# Patient Record
Sex: FEMALE | Race: WHITE | NOT HISPANIC OR LATINO | Employment: OTHER | URBAN - METROPOLITAN AREA
[De-identification: names, ages, dates, MRNs, and addresses within clinical notes are randomized per-mention and may not be internally consistent; named-entity substitution may affect disease eponyms.]

---

## 2017-03-14 ENCOUNTER — OFFICE VISIT (OUTPATIENT)
Dept: NEUROLOGY | Facility: CLINIC | Age: 77
End: 2017-03-14
Payer: MEDICARE

## 2017-03-14 VITALS
SYSTOLIC BLOOD PRESSURE: 118 MMHG | HEART RATE: 78 BPM | WEIGHT: 160.19 LBS | HEIGHT: 62 IN | BODY MASS INDEX: 29.48 KG/M2 | RESPIRATION RATE: 16 BRPM | DIASTOLIC BLOOD PRESSURE: 78 MMHG

## 2017-03-14 DIAGNOSIS — M54.5 LOW BACK PAIN, UNSPECIFIED BACK PAIN LATERALITY, UNSPECIFIED CHRONICITY, WITH SCIATICA PRESENCE UNSPECIFIED: ICD-10-CM

## 2017-03-14 DIAGNOSIS — M79.605 BILATERAL LEG PAIN: ICD-10-CM

## 2017-03-14 DIAGNOSIS — E53.8 VITAMIN B12 DEFICIENCY: ICD-10-CM

## 2017-03-14 DIAGNOSIS — E55.9 VITAMIN D DEFICIENCY: ICD-10-CM

## 2017-03-14 DIAGNOSIS — F32.A DEPRESSION, UNSPECIFIED DEPRESSION TYPE: ICD-10-CM

## 2017-03-14 DIAGNOSIS — M79.604 BILATERAL LEG PAIN: ICD-10-CM

## 2017-03-14 DIAGNOSIS — G57.93 NEUROPATHY INVOLVING BOTH LOWER EXTREMITIES: Primary | ICD-10-CM

## 2017-03-14 PROBLEM — M54.50 LOWER BACK PAIN: Status: ACTIVE | Noted: 2017-03-14

## 2017-03-14 PROCEDURE — 3074F SYST BP LT 130 MM HG: CPT | Mod: S$GLB,,, | Performed by: NURSE PRACTITIONER

## 2017-03-14 PROCEDURE — 99214 OFFICE O/P EST MOD 30 MIN: CPT | Mod: S$GLB,,, | Performed by: NURSE PRACTITIONER

## 2017-03-14 PROCEDURE — 99999 PR PBB SHADOW E&M-EST. PATIENT-LVL III: CPT | Mod: PBBFAC,,, | Performed by: NURSE PRACTITIONER

## 2017-03-14 PROCEDURE — 1160F RVW MEDS BY RX/DR IN RCRD: CPT | Mod: S$GLB,,, | Performed by: NURSE PRACTITIONER

## 2017-03-14 PROCEDURE — 3078F DIAST BP <80 MM HG: CPT | Mod: S$GLB,,, | Performed by: NURSE PRACTITIONER

## 2017-03-14 PROCEDURE — 1159F MED LIST DOCD IN RCRD: CPT | Mod: S$GLB,,, | Performed by: NURSE PRACTITIONER

## 2017-03-14 PROCEDURE — 1157F ADVNC CARE PLAN IN RCRD: CPT | Mod: S$GLB,,, | Performed by: NURSE PRACTITIONER

## 2017-03-14 RX ORDER — DICLOFENAC SODIUM 1 MG/ML
1 SOLUTION/ DROPS OPHTHALMIC 4 TIMES DAILY
COMMUNITY
End: 2018-01-04

## 2017-03-14 RX ORDER — METOPROLOL SUCCINATE 25 MG/1
25 TABLET, EXTENDED RELEASE ORAL DAILY
COMMUNITY
End: 2018-01-04 | Stop reason: DRUGHIGH

## 2017-03-14 RX ORDER — CILOSTAZOL 50 MG/1
50 TABLET ORAL 2 TIMES DAILY
COMMUNITY
End: 2019-10-29

## 2017-03-14 RX ORDER — DULOXETIN HYDROCHLORIDE 30 MG/1
30 CAPSULE, DELAYED RELEASE ORAL DAILY
Qty: 30 CAPSULE | Refills: 11 | Status: SHIPPED | OUTPATIENT
Start: 2017-03-14 | End: 2018-05-17

## 2017-03-14 NOTE — MR AVS SNAPSHOT
[facility]  [address] [address]  Phone: [phone]  Fax: [phone]                  Merari Romero   3/14/2017 11:20 AM   Office Visit    Description:  Female : 1940   Provider:  Anne Marie Sandra NP   Department:  [facility]           Reason for Visit     Follow-up     Pain           Diagnoses this Visit        Comments    Neuropathy involving both lower extremities    -  Primary     Low back pain, unspecified back pain laterality, unspecified chronicity, with sciatica presence unspecified         Bilateral leg pain         Vitamin B12 deficiency         Vitamin D deficiency         Depression, unspecified depression type                To Do List           Future Appointments        Provider Department Dept Phone    3/14/2017 11:50 AM LAB, [facility] [phone]    2017 10:00 AM Bo Preston MD [facility] [phone]      Goals (5 Years of Data)     None       These Medications        Disp Refills Start End    duloxetine (CYMBALTA) 30 MG capsule 30 capsule 11 3/14/2017 3/14/2018    Take 1 capsule (30 mg total) by mouth once daily. - Oral    Pharmacy: [address] Ph #: [phone]         [facility]     [facility] -  Assistance  Registered nurses in the [facility] provide clinical advisement, health education, appointment booking, and other advisory services.  Call for this free service at [phone].             Medications           Message regarding Medications     Verify the changes and/or additions to your medication regime listed below are the same as discussed with your clinician today.  If any of these changes or additions are incorrect, please notify your healthcare provider.        START taking these NEW medications        Refills    duloxetine (CYMBALTA) 30 MG  capsule 11    Sig: Take 1 capsule (30 mg total) by mouth once daily.    Class: Normal    Route: Oral      STOP taking these medications     clopidogrel (PLAVIX) 75 mg tablet Take 75 mg by mouth once daily.    doxycycline (VIBRAMYCIN) 100 MG Cap Take 100 mg by mouth every 12 (twelve) hours.    predniSONE (DELTASONE) 20 MG tablet Take 20 mg by mouth 2 (two) times daily.     zolpidem (AMBIEN) 10 mg Tab Take 1 tablet (10 mg total) by mouth nightly as needed.           Verify that the below list of medications is an accurate representation of the medications you are currently taking.  If none reported, the list may be blank. If incorrect, please contact your healthcare provider. Carry this list with you in case of emergency.           Current Medications     albuterol (PROVENTIL) 2.5 mg /3 mL (0.083 %) nebulizer solution Take 3 mLs (2.5 mg total) by nebulization every 6 (six) hours as needed.    amlodipine (NORVASC) 5 MG tablet take 1 tablet by mouth once daily    atorvastatin (LIPITOR) 20 MG tablet Take 20 mg by mouth once daily.    BREO ELLIPTA 100-25 mcg/dose diskus inhaler Inhale 1 puff into the lungs once daily.     cilostazol (PLETAL) 50 MG Tab Take 50 mg by mouth 2 (two) times daily.    diclofenac (VOLTAREN) 0.1 % ophthalmic solution 1 drop 4 (four) times daily.    fexofenadine (ALLEGRA) 60 MG tablet Take 1 tablet (60 mg total) by mouth once daily.    folic acid (FOLVITE) 1 MG tablet Take 1 mg by mouth once daily.     gabapentin (NEURONTIN) 600 MG tablet Take 600 mg by mouth 3 (three) times daily.     hydrocodone-acetaminophen 7.5-325mg (NORCO) 7.5-325 mg per tablet Take 1 tablet by mouth 3 (three) times daily.    INCRUSE ELLIPTA 62.5 mcg/actuation DsDv Take 1 puff by mouth once daily.    ketoconazole (NIZORAL) 2 % cream     meclizine (ANTIVERT) 25 mg tablet Take 25 mg by mouth every 6 (six) hours.     metoprolol succinate (TOPROL-XL) 25 MG 24 hr tablet Take 25 mg by mouth once daily.    omeprazole (PRILOSEC) 20  "MG capsule take 1 capsule by mouth once daily    PROAIR HFA 90 mcg/actuation inhaler Inhale 1-2 puffs into the lungs every 6 (six) hours as needed.     duloxetine (CYMBALTA) 30 MG capsule Take 1 capsule (30 mg total) by mouth once daily.    ipratropium (ATROVENT) 0.02 % nebulizer solution Take 2.5 mLs (0.5 mg total) by nebulization 4 (four) times daily.    pilocarpine (SALAGEN) 5 MG Tab            Clinical Reference Information           Your Vitals Were     BP Pulse Resp Height Weight Last Period    118/78 (BP Location: Right arm, Patient Position: Sitting, BP Method: Manual) 78 16 5' 2" (1.575 m) 72.7 kg (160 lb 2.6 oz) [birth year]/1978    BMI                29.29 kg/m2          Blood Pressure          Most Recent Value    BP  118/78      Allergies as of 3/14/2017     No Known Drug Allergies      Immunizations Administered on Date of Encounter - 3/14/2017     None      Orders Placed During Today's Visit     Future Labs/Procedures Expected by Expires    CBC auto differential  3/14/2017 5/13/2018    Comprehensive metabolic panel  3/14/2017 3/14/2018    Protein electrophoresis, serum  3/14/2017 5/13/2018    TSH  3/14/2017 5/13/2018    Vitamin B12  3/14/2017 5/13/2018    Vitamin D  3/14/2017 5/13/2018    EMG - 2 Extremities  As directed 3/14/2018    Nerve conduction test  As directed 3/14/2018      Language Assistance Services     ATTENTION: Language assistance services are available, free of charge. Please call [phone].      ATENCIÓN: Si habla barbie, tiene a novoa disposición servicios gratuitos de asistencia lingüística. Llame al [phone].     CHÚ Ý: N?u b?n nói Ti?ng Vi?t, có các d?ch v? h? tr? ngôn ng? mi?n phí dành cho b?n. G?i s? [phone].         [facility] complies with applicable Federal civil rights laws and does not discriminate on the basis of race, color, national origin, age, disability, or sex.        "

## 2017-03-14 NOTE — PROGRESS NOTES
HPI: Merari Romero is a 77 y.o. female was previously followed by Dr. Preston for multiple neurological work ups in the past, [place], for complaint of saddle anesthesia, BLE weakness and pain, with low back pain, with unknown etiology. She progressed with more complaints, with incontinence at times. Work up initially revealed degenerative disc disease at L5-S1 and monoclonal gammonopathy of undetermined signficance; repeat EMGs normal with normal T-spine imaging and stable degenerative changes on C-spine imaging. She was seen by Dr. Chavez in 2013, and was referred to cardiology for evaluation of PAD, which is followed by Dr. Seals. She had a Sudoscan per Dr. Shaun Barraza, which suggested peripheral autonomic neuropathy. She was also worked up by Rheumatology in the past, with no evidence of CT disease.     She presents today for a follow up visit. She was last seen by Dr. Preston in 3/2016, and was lost to follow up. She has worsening complaints of bilateral leg pain today, L>R, as well as BLE paresthesias, and lumbar pain. Her greatest complaint today is pain to her great toes bilaterally with numbness. She also admits to having some depression.     Review of Systems   Constitutional: Negative for fever.   Eyes: Negative for double vision.   Respiratory: Negative for hemoptysis.    Cardiovascular: Negative for leg swelling.   Gastrointestinal: Negative for blood in stool.   Genitourinary: Negative for hematuria.   Musculoskeletal: Positive for back pain. Negative for falls.   Skin: Negative for rash.   Neurological: Positive for sensory change. Negative for dizziness, loss of consciousness and headaches.   Psychiatric/Behavioral: Positive for depression. Negative for memory loss.     Exam:   Gen Appearance, well developed/nourished in no apparent distress  CV: 2+ distal pulses with no edema or swelling  Neuro:  MS: Awake, alert, Sustains attention. Recent/remote memory intact, Language is full  to spontaneous speech/comprehension. Fund of Knowledge is full  CN: Optic discs are flat with normal vasculature, PERRL, Extraoccular movements and visual fields are full. Normal facial sensation and strength, Tongue and Palate are midline and strong. Shoulder Shrug symmetric and strong.  Motor: Normal except in the left leg and bilateral hand and feet intrinsics, tone mostly normal except possibly the left leg,no fasiculations today, 5/5 strength bilateral upper/lower extremities except 4++/5 bilateral intrinsic foot and hand muscles with left leg reduced to 4/5 left hip flex and 4/5 in the left foot dorsiflexion and knee extension with 2+ reflexes in the arms and 1+ in the legs and bilateral plantar response, No clonus.   Sensory: symmetric decrease to temp vibration in the distal legs; Romberg negative  Gait: Normal stance, no ataxia, but there is signs of left leg weakness again noted today. -- patient states she usually uses a cane    Imaging:     3/1/12 bone scan reviewed: Degenerative changes only    1/2013 MRI L spine:   IMPRESSION: DISC SPACE NARROWING AT L3-4 AND L5-S1  WITH A SLIGHT FIRST DEGREE SPONDYLOLISTHESIS AT L5-S1 ON  A DEGENERATIVE BASIS OF THE APOPHYSEAL JOINTS AT THIS  LEVEL. 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 PROBABLE SMALL TARLOV  CYST AT T12-L1 ON THE RIGHT.    Sudoscan with Dr Barraza: Possible autonomic neuropathy    Assessment/Recommendation:  Merari Romero is a 77 y.o. female was previously followed by Dr. Preston for multiple neurological work ups in the past, which were [place] of state, for complaint of saddle anesthesia, BLE weakness and pain, with low back pain, with unknown etiology. She progressed with more complaints, with incontinence at times. Work up initially revealed degenerative disc disease at L5-S1 and monoclonal gammonopathy of undetermined signficance; repeat EMGs normal with normal T-spine imaging and stable degenerative changes on C-spine imaging. She was seen by Dr. Chavez in  2013, and was referred to cardiology for evaluation of PAD, which is followed by Dr. Saels. She had a Sudoscan per Dr. Shaun Barraza, which suggested peripheral autonomic neuropathy. She was also worked up by Rheumatology in the past, with no evidence of CT disease.     She presents today with worsening complaints of bilateral leg pain and paresthesias, as well as worsening lumbar complaints.     1. Start Cymbalta for neuropathic type complaints, as well as her depression, in addition to her Gabapentin. She is also using a compound cream and Norco prn.   2. Repeat SPEP with RICHI today, as well as a CBC, CMP, TSH, B12, and Vitamin D level, given her prior abnormal SPEP and worsening neuropathic complaints.   3. Order EMG/NCS BLE to assess for neuropathic and lumbar radicular causes.     Follow up one week after EMG.           1. The patient has had a sudoscan with PCP showing possible autonomic neuropathy. 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 She does not have any autonomic features currently and EMGs have been normal prior for large fiber changes  2. Repeat SPEP with RICHI today. Hematology oncology follow up needed, as recommended prior, for MGUS. Patient asked to call for results.   3. Neurosurgery in 2013 recommended no surgery for her Lumbar disc disease.She has had lumbar injections and PT in the past. She is using pain medications with PCP and also has a history of left hip repair which also limits leg movement and gait exam in my opinion  4. Patient is also treated with Pletal by Cardiology for PAD in the legs  5. Previously with me she tried baclofen, flexeril for pain. She is on gabapentin for pain modulation currently but is not using TID as prescribed on most days. Can increase to TID as prescribed consistently for further pain relief.   It was the impression of Dr Rojo, in second  opinion, that some of her symptoms could be referred from the C spine. Will continue to follow  6. Neurological exam has not  worsened since 2013 visit. Advised patient to use a cane always/ fall precautions reviewed.       RTC in 3 months  Cc: Dr Barraza

## 2018-01-04 ENCOUNTER — OFFICE VISIT (OUTPATIENT)
Dept: INTERNAL MEDICINE | Facility: CLINIC | Age: 78
End: 2018-01-04
Payer: MEDICARE

## 2018-01-04 VITALS
HEART RATE: 74 BPM | DIASTOLIC BLOOD PRESSURE: 66 MMHG | WEIGHT: 149 LBS | HEIGHT: 63 IN | SYSTOLIC BLOOD PRESSURE: 112 MMHG | OXYGEN SATURATION: 96 % | RESPIRATION RATE: 18 BRPM | BODY MASS INDEX: 26.4 KG/M2

## 2018-01-04 DIAGNOSIS — M19.90 OSTEOARTHRITIS, UNSPECIFIED OSTEOARTHRITIS TYPE, UNSPECIFIED SITE: ICD-10-CM

## 2018-01-04 DIAGNOSIS — M47.816 LUMBAR FACET ARTHROPATHY: ICD-10-CM

## 2018-01-04 DIAGNOSIS — E78.5 HYPERLIPIDEMIA LDL GOAL <70: ICD-10-CM

## 2018-01-04 DIAGNOSIS — M79.2 NEURALGIA AND NEURITIS: ICD-10-CM

## 2018-01-04 DIAGNOSIS — I10 ESSENTIAL HYPERTENSION: Primary | ICD-10-CM

## 2018-01-04 DIAGNOSIS — G57.93 NEUROPATHY INVOLVING BOTH LOWER EXTREMITIES: ICD-10-CM

## 2018-01-04 PROCEDURE — 99204 OFFICE O/P NEW MOD 45 MIN: CPT | Mod: S$GLB,,, | Performed by: NURSE PRACTITIONER

## 2018-01-04 PROCEDURE — 99999 PR PBB SHADOW E&M-EST. PATIENT-LVL V: CPT | Mod: PBBFAC,,, | Performed by: NURSE PRACTITIONER

## 2018-01-04 RX ORDER — METOPROLOL SUCCINATE 50 MG/1
50 TABLET, EXTENDED RELEASE ORAL DAILY
COMMUNITY
Start: 2017-12-19 | End: 2019-10-29

## 2018-01-04 RX ORDER — OMEPRAZOLE 40 MG/1
40 CAPSULE, DELAYED RELEASE ORAL
COMMUNITY
Start: 2017-11-30 | End: 2019-10-29

## 2018-01-04 RX ORDER — FUROSEMIDE 20 MG/1
20 TABLET ORAL 2 TIMES DAILY
Status: ON HOLD | COMMUNITY
End: 2018-05-11

## 2018-01-04 RX ORDER — METHOCARBAMOL 500 MG/1
TABLET, FILM COATED ORAL
COMMUNITY
Start: 2017-12-18 | End: 2018-01-04

## 2018-01-04 NOTE — PROGRESS NOTES
Subjective:       Patient ID: Merari Romero is a 77 y.o. female.    Chief Complaint: Establish Care    Patient is known, to me and presents with   Chief Complaint   Patient presents with    [facility]   .  Denies chest pain and shortness of breath.  Patient presents with check up and is up to date with screenings. Does not want mammogram. Will need to be set up with pain management. Had flu shot.  HPI  Review of Systems   Constitutional: Negative for activity change, appetite change, fatigue, fever and unexpected weight change.   HENT: Negative for congestion, ear discharge, ear pain, hearing loss, postnasal drip and tinnitus.    Eyes: Negative for photophobia, pain and visual disturbance.   Respiratory: Negative for cough, shortness of breath, wheezing and stridor.    Cardiovascular: Negative for chest pain, palpitations and leg swelling.   Gastrointestinal: Negative for abdominal distention.   Genitourinary: Negative for difficulty urinating, dysuria, frequency, hematuria and urgency.   Musculoskeletal: Positive for arthralgias, back pain, gait problem and neck pain. Negative for joint swelling.   Skin: Negative.    Neurological: Negative for dizziness, seizures, syncope, weakness, light-headedness, numbness and headaches.   Hematological: Negative for adenopathy. Does not bruise/bleed easily.   Psychiatric/Behavioral: Negative for behavioral problems, confusion, hallucinations, sleep disturbance and suicidal ideas. The patient is not nervous/anxious.        Objective:      Physical Exam   Constitutional: She is oriented to person, place, and time. She appears well-developed and well-nourished. No distress.   HENT:   Head: Normocephalic and atraumatic.   Right Ear: External ear normal.   Left Ear: External ear normal.   Mouth/Throat: Oropharynx is clear and moist. No oropharyngeal exudate.   Eyes: Conjunctivae and EOM are normal. Pupils are equal, round, and reactive to light. Right eye exhibits no discharge.  Left eye exhibits no discharge.   Neck: Normal range of motion. Neck supple. No JVD present. No thyromegaly present.   Cardiovascular: Normal rate, regular rhythm, normal heart sounds and intact distal pulses.  Exam reveals no gallop and no friction rub.    No murmur heard.  Pulmonary/Chest: Effort normal and breath sounds normal. No stridor. No respiratory distress. She has no wheezes. She has no rales. She exhibits no tenderness.   Abdominal: Soft. Bowel sounds are normal. She exhibits no distension and no mass. There is no tenderness. There is no rebound.   Musculoskeletal: She exhibits no edema or tenderness.        Lumbar back: She exhibits decreased range of motion.   Lymphadenopathy:     She has no cervical adenopathy.   Neurological: She is alert and oriented to person, place, and time. She has normal reflexes. She displays normal reflexes. No cranial nerve deficit. She exhibits normal muscle tone. Coordination normal.   Skin: Skin is warm and dry. Capillary refill takes less than 2 seconds. No rash noted. No erythema. No pallor.   Psychiatric: She has a normal mood and affect. Her behavior is normal. Judgment and thought content normal.       Assessment:       1. Essential hypertension    2. Hyperlipidemia LDL goal <70    3. Lumbar facet arthropathy    4. Neuralgia and neuritis    5. Neuropathy involving both lower extremities    6. Osteoarthritis, unspecified osteoarthritis type, unspecified site        Plan:   Merari HALE was seen today for establish care.    Diagnoses and all orders for this visit:    Essential hypertension  -     Comprehensive metabolic panel; Future  -     CBC auto differential; Future  -     Microalbumin/creatinine urine ratio; Future    Hyperlipidemia LDL goal <70  -     Comprehensive metabolic panel; Future  -     CBC auto differential; Future  -     TSH; Future  -     Lipid panel; Future    Lumbar facet arthropathy  -     Comprehensive metabolic panel; Future  -     CBC auto  "differential; Future  -     Ambulatory referral to Pain Clinic    Neuralgia and neuritis  -     Comprehensive metabolic panel; Future  -     CBC auto differential; Future  -     Ambulatory referral to Pain Clinic    Neuropathy involving both lower extremities  -     Comprehensive metabolic panel; Future  -     CBC auto differential; Future  -     Ambulatory referral to Pain Clinic    Osteoarthritis, unspecified osteoarthritis type, unspecified site  -     Comprehensive metabolic panel; Future  -     CBC auto differential; Future  -     Ambulatory referral to Pain Clinic    "This note will not be shared with the patient."  Will send to pain management due to chronic pain meds  Continue with plan of care  Refills on meds.  Medication compliance was discussed with the patient.   Medication side effects were discussed.  The patient was instructed on using exercise frequently to reduce blood pressure.  Thirty to forty-five minutes of brisk walking three to four times a week is often helpful to lower your blood pressure.  Monitor blood pressures at home and to record the values in a log.  The patient was instructed to monitor weight closely and to try to keep it as close to ideal body weight as possible.  Reduce salt intake to less than 2 grams per day.  Do not add salt to food at the table.  Reduce or get rid of salt used in cooking.  Limit processed and fast foods.  Read package labels for amount of salt (soduim) in foods.  Losing weight, even just 10 pounds, of can decrease blood pressure.  rtc as scheduled  "

## 2018-01-04 NOTE — PATIENT INSTRUCTIONS

## 2018-01-05 ENCOUNTER — CLINICAL SUPPORT (OUTPATIENT)
Dept: INTERNAL MEDICINE | Facility: CLINIC | Age: 78
End: 2018-01-05
Payer: MEDICARE

## 2018-01-05 DIAGNOSIS — M19.90 OSTEOARTHRITIS, UNSPECIFIED OSTEOARTHRITIS TYPE, UNSPECIFIED SITE: ICD-10-CM

## 2018-01-05 DIAGNOSIS — M79.2 NEURALGIA AND NEURITIS: ICD-10-CM

## 2018-01-05 DIAGNOSIS — I10 ESSENTIAL HYPERTENSION: ICD-10-CM

## 2018-01-05 DIAGNOSIS — M47.816 LUMBAR FACET ARTHROPATHY: ICD-10-CM

## 2018-01-05 DIAGNOSIS — G57.93 NEUROPATHY INVOLVING BOTH LOWER EXTREMITIES: ICD-10-CM

## 2018-01-05 DIAGNOSIS — E78.5 HYPERLIPIDEMIA LDL GOAL <70: ICD-10-CM

## 2018-01-05 LAB
ALBUMIN SERPL BCP-MCNC: 3.3 G/DL
ALP SERPL-CCNC: 119 U/L
ALT SERPL W/O P-5'-P-CCNC: 21 U/L
ANION GAP SERPL CALC-SCNC: 7 MMOL/L
AST SERPL-CCNC: 33 U/L
BASOPHILS # BLD AUTO: 0.06 K/UL
BASOPHILS NFR BLD: 1 %
BILIRUB SERPL-MCNC: 0.6 MG/DL
BUN SERPL-MCNC: 13 MG/DL
CALCIUM SERPL-MCNC: 9.3 MG/DL
CHLORIDE SERPL-SCNC: 101 MMOL/L
CHOLEST SERPL-MCNC: 176 MG/DL
CHOLEST/HDLC SERPL: 3.1 {RATIO}
CO2 SERPL-SCNC: 29 MMOL/L
CREAT SERPL-MCNC: 0.8 MG/DL
CREAT UR-MCNC: 145 MG/DL
DIFFERENTIAL METHOD: ABNORMAL
EOSINOPHIL # BLD AUTO: 0.4 K/UL
EOSINOPHIL NFR BLD: 7 %
ERYTHROCYTE [DISTWIDTH] IN BLOOD BY AUTOMATED COUNT: 13.9 %
EST. GFR  (AFRICAN AMERICAN): >60 ML/MIN/1.73 M^2
EST. GFR  (NON AFRICAN AMERICAN): >60 ML/MIN/1.73 M^2
GLUCOSE SERPL-MCNC: 82 MG/DL
HCT VFR BLD AUTO: 43.6 %
HDLC SERPL-MCNC: 56 MG/DL
HDLC SERPL: 31.8 %
HGB BLD-MCNC: 13.9 G/DL
IMM GRANULOCYTES # BLD AUTO: 0.03 K/UL
IMM GRANULOCYTES NFR BLD AUTO: 0.5 %
LDLC SERPL CALC-MCNC: 91.8 MG/DL
LYMPHOCYTES # BLD AUTO: 1.9 K/UL
LYMPHOCYTES NFR BLD: 29.9 %
MCH RBC QN AUTO: 27.7 PG
MCHC RBC AUTO-ENTMCNC: 31.9 G/DL
MCV RBC AUTO: 87 FL
MICROALBUMIN UR DL<=1MG/L-MCNC: 6 UG/ML
MICROALBUMIN/CREATININE RATIO: 4.1 UG/MG
MONOCYTES # BLD AUTO: 0.6 K/UL
MONOCYTES NFR BLD: 10.2 %
NEUTROPHILS # BLD AUTO: 3.2 K/UL
NEUTROPHILS NFR BLD: 51.4 %
NONHDLC SERPL-MCNC: 120 MG/DL
NRBC BLD-RTO: 0 /100 WBC
PLATELET # BLD AUTO: 256 K/UL
PMV BLD AUTO: 10.9 FL
POTASSIUM SERPL-SCNC: 4.4 MMOL/L
PROT SERPL-MCNC: 8 G/DL
RBC # BLD AUTO: 5.02 M/UL
SODIUM SERPL-SCNC: 137 MMOL/L
TRIGL SERPL-MCNC: 141 MG/DL
TSH SERPL DL<=0.005 MIU/L-ACNC: 1.4 UIU/ML
WBC # BLD AUTO: 6.25 K/UL

## 2018-01-05 PROCEDURE — 99999 PR PBB SHADOW E&M-EST. PATIENT-LVL I: CPT | Mod: PBBFAC,,,

## 2018-01-05 PROCEDURE — 82043 UR ALBUMIN QUANTITATIVE: CPT

## 2018-01-05 PROCEDURE — 84443 ASSAY THYROID STIM HORMONE: CPT

## 2018-01-05 PROCEDURE — 85025 COMPLETE CBC W/AUTO DIFF WBC: CPT

## 2018-01-05 PROCEDURE — 80053 COMPREHEN METABOLIC PANEL: CPT

## 2018-01-05 PROCEDURE — 36415 COLL VENOUS BLD VENIPUNCTURE: CPT | Mod: S$GLB,,, | Performed by: NURSE PRACTITIONER

## 2018-01-05 PROCEDURE — 80061 LIPID PANEL: CPT

## 2018-02-12 ENCOUNTER — OFFICE VISIT (OUTPATIENT)
Dept: INTERNAL MEDICINE | Facility: CLINIC | Age: 78
End: 2018-02-12
Payer: MEDICARE

## 2018-02-12 VITALS
TEMPERATURE: 97 F | HEART RATE: 74 BPM | HEIGHT: 63 IN | RESPIRATION RATE: 20 BRPM | WEIGHT: 149 LBS | OXYGEN SATURATION: 96 % | DIASTOLIC BLOOD PRESSURE: 60 MMHG | SYSTOLIC BLOOD PRESSURE: 110 MMHG | BODY MASS INDEX: 26.4 KG/M2

## 2018-02-12 DIAGNOSIS — B34.9 VIRAL ILLNESS: Primary | ICD-10-CM

## 2018-02-12 PROCEDURE — 1159F MED LIST DOCD IN RCRD: CPT | Mod: S$GLB,,, | Performed by: NURSE PRACTITIONER

## 2018-02-12 PROCEDURE — 96372 THER/PROPH/DIAG INJ SC/IM: CPT | Mod: S$GLB,,, | Performed by: INTERNAL MEDICINE

## 2018-02-12 PROCEDURE — 1126F AMNT PAIN NOTED NONE PRSNT: CPT | Mod: S$GLB,,, | Performed by: NURSE PRACTITIONER

## 2018-02-12 PROCEDURE — 3008F BODY MASS INDEX DOCD: CPT | Mod: S$GLB,,, | Performed by: NURSE PRACTITIONER

## 2018-02-12 PROCEDURE — 99213 OFFICE O/P EST LOW 20 MIN: CPT | Mod: S$GLB,,, | Performed by: NURSE PRACTITIONER

## 2018-02-12 PROCEDURE — 99999 PR PBB SHADOW E&M-EST. PATIENT-LVL V: CPT | Mod: PBBFAC,,, | Performed by: NURSE PRACTITIONER

## 2018-02-12 RX ORDER — AMITRIPTYLINE HYDROCHLORIDE 25 MG/1
25 TABLET, FILM COATED ORAL NIGHTLY
Refills: 0 | COMMUNITY
Start: 2018-02-06 | End: 2019-10-29

## 2018-02-12 RX ORDER — METHYLPREDNISOLONE ACETATE 80 MG/ML
80 INJECTION, SUSPENSION INTRA-ARTICULAR; INTRALESIONAL; INTRAMUSCULAR; SOFT TISSUE
Status: COMPLETED | OUTPATIENT
Start: 2018-02-12 | End: 2018-02-12

## 2018-02-12 RX ADMIN — METHYLPREDNISOLONE ACETATE 80 MG: 80 INJECTION, SUSPENSION INTRA-ARTICULAR; INTRALESIONAL; INTRAMUSCULAR; SOFT TISSUE at 12:02

## 2018-02-12 NOTE — PROGRESS NOTES
Subjective:       Patient ID: Merari Romero is a 78 y.o. female.    Chief Complaint: Dizziness and Cough    Patient is known, to me and presents with   Chief Complaint   Patient presents with    Dizziness    Cough   .  Denies chest pain and shortness of breath.  Patient presents with cough and some dizziness. States that she is feeling better today but wanted to get checked  HPI  Review of Systems   Constitutional: Negative.    HENT: Positive for postnasal drip.    Respiratory: Positive for cough.    Cardiovascular: Negative.    Skin: Negative.    Hematological: Negative.        Objective:      Physical Exam   Constitutional: She is oriented to person, place, and time. She appears well-developed and well-nourished. No distress.   HENT:   Head: Normocephalic and atraumatic.   Right Ear: External ear normal.   Left Ear: External ear normal.   Nose: Nose normal.   Mouth/Throat: Oropharynx is clear and moist. No oropharyngeal exudate.   Eyes: Conjunctivae are normal. Right eye exhibits no discharge. Left eye exhibits no discharge.   Neck: Normal range of motion. Neck supple. No JVD present. No tracheal deviation present. No thyromegaly present.   Cardiovascular: Normal rate, regular rhythm and normal heart sounds.    No murmur heard.  Pulmonary/Chest: Effort normal and breath sounds normal. No stridor. She has no wheezes.   Neurological: She is alert and oriented to person, place, and time. She displays normal reflexes. No cranial nerve deficit or sensory deficit. She exhibits normal muscle tone. Coordination normal.   Negative nystagmus   Skin: Skin is warm and dry. Capillary refill takes less than 2 seconds. No rash noted. She is not diaphoretic. No erythema. No pallor.       Assessment:       1. Viral illness        Plan:   Merari HALE was seen today for dizziness and cough.    Diagnoses and all orders for this visit:    Viral illness  -     methylPREDNISolone acetate injection 80 mg; Inject 1 mL (80 mg total) into the  "muscle one time.    "This note will not be shared with the patient."  Will do depo shot and if any worsening will let me know  RTC as scheduled  "

## 2018-05-11 ENCOUNTER — HOSPITAL ENCOUNTER (INPATIENT)
Facility: HOSPITAL | Age: 78
LOS: 6 days | Discharge: NURSING, REHAB OR LONG-TERM CARE | DRG: 563 | End: 2018-05-17
Attending: SURGERY | Admitting: FAMILY MEDICINE
Payer: MEDICARE

## 2018-05-11 DIAGNOSIS — Z74.01 BED CONFINEMENT STATUS: ICD-10-CM
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
## 2018-05-11 DIAGNOSIS — G57.93 NEUROPATHY INVOLVING BOTH LOWER EXTREMITIES: ICD-10-CM

## 2018-05-11 DIAGNOSIS — I10 ESSENTIAL HYPERTENSION: ICD-10-CM

## 2018-05-11 DIAGNOSIS — Z01.818 PRE-OP EVALUATION: ICD-10-CM

## 2018-05-11 DIAGNOSIS — S52.135A CLOSED NONDISPLACED FRACTURE OF NECK OF LEFT RADIUS, INITIAL ENCOUNTER: Primary | ICD-10-CM

## 2018-05-11 DIAGNOSIS — S32.502A CLOSED FRACTURE OF LEFT PUBIS, UNSPECIFIED PORTION OF PUBIS, INITIAL ENCOUNTER: ICD-10-CM

## 2018-05-11 DIAGNOSIS — S52.135D CLOSED NONDISPLACED FRACTURE OF NECK OF LEFT RADIUS WITH ROUTINE HEALING, SUBSEQUENT ENCOUNTER: ICD-10-CM

## 2018-05-11 DIAGNOSIS — R39.11 URINARY HESITANCY: ICD-10-CM

## 2018-05-11 DIAGNOSIS — S52.135A CLOSED NONDISPLACED FRACTURE OF NECK OF LEFT RADIUS: ICD-10-CM
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
## 2018-05-11 DIAGNOSIS — S32.89XA CLOSED FRACTURE OF OTHER PARTS OF PELVIS, INITIAL ENCOUNTER: ICD-10-CM

## 2018-05-11 PROBLEM — Z86.718 HISTORY OF DEEP VENOUS THROMBOSIS: Status: ACTIVE | Noted: 2018-05-11

## 2018-05-11 LAB
ALBUMIN SERPL BCP-MCNC: 3.4 G/DL
ALP SERPL-CCNC: 121 U/L
ALT SERPL W/O P-5'-P-CCNC: 42 U/L
ANION GAP SERPL CALC-SCNC: 11 MMOL/L
APTT BLDCRRT: 25.5 SEC
AST SERPL-CCNC: 45 U/L
BASOPHILS # BLD AUTO: 0.04 K/UL
BASOPHILS NFR BLD: 0.6 %
BILIRUB SERPL-MCNC: 0.7 MG/DL
BUN SERPL-MCNC: 12 MG/DL
CALCIUM SERPL-MCNC: 9.3 MG/DL
CHLORIDE SERPL-SCNC: 102 MMOL/L
CO2 SERPL-SCNC: 24 MMOL/L
CREAT SERPL-MCNC: 0.9 MG/DL
DIFFERENTIAL METHOD: NORMAL
EOSINOPHIL # BLD AUTO: 0.2 K/UL
EOSINOPHIL NFR BLD: 2.7 %
ERYTHROCYTE [DISTWIDTH] IN BLOOD BY AUTOMATED COUNT: 14.4 %
EST. GFR  (AFRICAN AMERICAN): >60 ML/MIN/1.73 M^2
EST. GFR  (NON AFRICAN AMERICAN): >60 ML/MIN/1.73 M^2
GLUCOSE SERPL-MCNC: 161 MG/DL
HCT VFR BLD AUTO: 40.8 %
HGB BLD-MCNC: 13.7 G/DL
INR PPP: 1.1
LYMPHOCYTES # BLD AUTO: 2.2 K/UL
LYMPHOCYTES NFR BLD: 30.3 %
MCH RBC QN AUTO: 29.3 PG
MCHC RBC AUTO-ENTMCNC: 33.6 G/DL
MCV RBC AUTO: 87 FL
MONOCYTES # BLD AUTO: 0.5 K/UL
MONOCYTES NFR BLD: 6.3 %
NEUTROPHILS # BLD AUTO: 4.3 K/UL
NEUTROPHILS NFR BLD: 60.1 %
PLATELET # BLD AUTO: 236 K/UL
PMV BLD AUTO: 10.3 FL
POTASSIUM SERPL-SCNC: 4 MMOL/L
PROT SERPL-MCNC: 7.7 G/DL
PROTHROMBIN TIME: 10.8 SEC
RBC # BLD AUTO: 4.67 M/UL
SODIUM SERPL-SCNC: 137 MMOL/L
WBC # BLD AUTO: 7.12 K/UL

## 2018-05-11 PROCEDURE — 63600175 PHARM REV CODE 636 W HCPCS: Performed by: FAMILY MEDICINE

## 2018-05-11 PROCEDURE — 96361 HYDRATE IV INFUSION ADD-ON: CPT

## 2018-05-11 PROCEDURE — 85610 PROTHROMBIN TIME: CPT

## 2018-05-11 PROCEDURE — G0378 HOSPITAL OBSERVATION PER HR: HCPCS

## 2018-05-11 PROCEDURE — 36415 COLL VENOUS BLD VENIPUNCTURE: CPT

## 2018-05-11 PROCEDURE — 27000221 HC OXYGEN, UP TO 24 HOURS

## 2018-05-11 PROCEDURE — 80053 COMPREHEN METABOLIC PANEL: CPT

## 2018-05-11 PROCEDURE — 93005 ELECTROCARDIOGRAM TRACING: CPT

## 2018-05-11 PROCEDURE — 96376 TX/PRO/DX INJ SAME DRUG ADON: CPT

## 2018-05-11 PROCEDURE — 96374 THER/PROPH/DIAG INJ IV PUSH: CPT

## 2018-05-11 PROCEDURE — 96375 TX/PRO/DX INJ NEW DRUG ADDON: CPT

## 2018-05-11 PROCEDURE — 94761 N-INVAS EAR/PLS OXIMETRY MLT: CPT

## 2018-05-11 PROCEDURE — 99220 PR INITIAL OBSERVATION CARE,LEVL III: CPT | Mod: ,,, | Performed by: FAMILY MEDICINE

## 2018-05-11 PROCEDURE — 63600175 PHARM REV CODE 636 W HCPCS: Performed by: SURGERY

## 2018-05-11 PROCEDURE — 85025 COMPLETE CBC W/AUTO DIFF WBC: CPT

## 2018-05-11 PROCEDURE — 90715 TDAP VACCINE 7 YRS/> IM: CPT | Performed by: SURGERY

## 2018-05-11 PROCEDURE — 25000003 PHARM REV CODE 250: Performed by: FAMILY MEDICINE

## 2018-05-11 PROCEDURE — 96372 THER/PROPH/DIAG INJ SC/IM: CPT | Mod: 59

## 2018-05-11 PROCEDURE — 90471 IMMUNIZATION ADMIN: CPT | Performed by: SURGERY

## 2018-05-11 PROCEDURE — 99285 EMERGENCY DEPT VISIT HI MDM: CPT | Mod: 25

## 2018-05-11 PROCEDURE — 85730 THROMBOPLASTIN TIME PARTIAL: CPT

## 2018-05-11 PROCEDURE — 25000003 PHARM REV CODE 250: Performed by: SURGERY

## 2018-05-11 PROCEDURE — 11000001 HC ACUTE MED/SURG PRIVATE ROOM

## 2018-05-11 RX ORDER — ONDANSETRON 2 MG/ML
4 INJECTION INTRAMUSCULAR; INTRAVENOUS
Status: COMPLETED | OUTPATIENT
Start: 2018-05-11 | End: 2018-05-11

## 2018-05-11 RX ORDER — ATORVASTATIN CALCIUM 20 MG/1
20 TABLET, FILM COATED ORAL DAILY
Status: DISCONTINUED | OUTPATIENT
Start: 2018-05-12 | End: 2018-05-17 | Stop reason: HOSPADM

## 2018-05-11 RX ORDER — ONDANSETRON 2 MG/ML
4 INJECTION INTRAMUSCULAR; INTRAVENOUS EVERY 8 HOURS PRN
Status: DISCONTINUED | OUTPATIENT
Start: 2018-05-11 | End: 2018-05-17 | Stop reason: HOSPADM

## 2018-05-11 RX ORDER — PANTOPRAZOLE SODIUM 40 MG/1
40 TABLET, DELAYED RELEASE ORAL DAILY
Status: DISCONTINUED | OUTPATIENT
Start: 2018-05-12 | End: 2018-05-17 | Stop reason: HOSPADM

## 2018-05-11 RX ORDER — DULOXETIN HYDROCHLORIDE 30 MG/1
30 CAPSULE, DELAYED RELEASE ORAL DAILY
Status: DISCONTINUED | OUTPATIENT
Start: 2018-05-12 | End: 2018-05-17 | Stop reason: HOSPADM

## 2018-05-11 RX ORDER — HYDROCODONE BITARTRATE AND ACETAMINOPHEN 5; 325 MG/1; MG/1
1 TABLET ORAL EVERY 4 HOURS PRN
Status: DISCONTINUED | OUTPATIENT
Start: 2018-05-11 | End: 2018-05-17 | Stop reason: HOSPADM

## 2018-05-11 RX ORDER — ACETAMINOPHEN 325 MG/1
650 TABLET ORAL EVERY 8 HOURS PRN
Status: DISCONTINUED | OUTPATIENT
Start: 2018-05-11 | End: 2018-05-17 | Stop reason: HOSPADM

## 2018-05-11 RX ORDER — HYDROMORPHONE HYDROCHLORIDE 1 MG/ML
0.5 INJECTION, SOLUTION INTRAMUSCULAR; INTRAVENOUS; SUBCUTANEOUS EVERY 4 HOURS PRN
Status: DISCONTINUED | OUTPATIENT
Start: 2018-05-11 | End: 2018-05-12

## 2018-05-11 RX ORDER — LORAZEPAM 0.5 MG/1
0.5 TABLET ORAL
Status: COMPLETED | OUTPATIENT
Start: 2018-05-11 | End: 2018-05-11

## 2018-05-11 RX ORDER — HYDROMORPHONE HYDROCHLORIDE 1 MG/ML
0.5 INJECTION, SOLUTION INTRAMUSCULAR; INTRAVENOUS; SUBCUTANEOUS
Status: COMPLETED | OUTPATIENT
Start: 2018-05-11 | End: 2018-05-11

## 2018-05-11 RX ORDER — FUROSEMIDE 20 MG/1
20 TABLET ORAL 2 TIMES DAILY
Status: DISCONTINUED | OUTPATIENT
Start: 2018-05-11 | End: 2018-05-11

## 2018-05-11 RX ORDER — AMLODIPINE BESYLATE 5 MG/1
5 TABLET ORAL DAILY
Status: DISCONTINUED | OUTPATIENT
Start: 2018-05-12 | End: 2018-05-17 | Stop reason: HOSPADM

## 2018-05-11 RX ORDER — AMITRIPTYLINE HYDROCHLORIDE 25 MG/1
25 TABLET, FILM COATED ORAL NIGHTLY
Status: DISCONTINUED | OUTPATIENT
Start: 2018-05-11 | End: 2018-05-17 | Stop reason: HOSPADM

## 2018-05-11 RX ORDER — MUPIROCIN 20 MG/G
1 OINTMENT TOPICAL
Status: COMPLETED | OUTPATIENT
Start: 2018-05-11 | End: 2018-05-11

## 2018-05-11 RX ORDER — ENOXAPARIN SODIUM 100 MG/ML
40 INJECTION SUBCUTANEOUS EVERY 24 HOURS
Status: DISCONTINUED | OUTPATIENT
Start: 2018-05-11 | End: 2018-05-17 | Stop reason: HOSPADM

## 2018-05-11 RX ORDER — HYDROMORPHONE HYDROCHLORIDE 1 MG/ML
0.2 INJECTION, SOLUTION INTRAMUSCULAR; INTRAVENOUS; SUBCUTANEOUS EVERY 4 HOURS PRN
Status: DISCONTINUED | OUTPATIENT
Start: 2018-05-11 | End: 2018-05-11

## 2018-05-11 RX ORDER — METOPROLOL SUCCINATE 50 MG/1
50 TABLET, EXTENDED RELEASE ORAL DAILY
Status: DISCONTINUED | OUTPATIENT
Start: 2018-05-12 | End: 2018-05-17 | Stop reason: HOSPADM

## 2018-05-11 RX ORDER — IPRATROPIUM BROMIDE AND ALBUTEROL SULFATE 2.5; .5 MG/3ML; MG/3ML
3 SOLUTION RESPIRATORY (INHALATION) EVERY 12 HOURS
Status: DISCONTINUED | OUTPATIENT
Start: 2018-05-12 | End: 2018-05-17 | Stop reason: HOSPADM

## 2018-05-11 RX ORDER — CILOSTAZOL 50 MG/1
50 TABLET ORAL 2 TIMES DAILY
Status: DISCONTINUED | OUTPATIENT
Start: 2018-05-11 | End: 2018-05-11

## 2018-05-11 RX ADMIN — LORAZEPAM 0.5 MG: 0.5 TABLET ORAL at 05:05

## 2018-05-11 RX ADMIN — HYDROMORPHONE HYDROCHLORIDE 0.5 MG: 1 INJECTION, SOLUTION INTRAMUSCULAR; INTRAVENOUS; SUBCUTANEOUS at 04:05

## 2018-05-11 RX ADMIN — ONDANSETRON 4 MG: 2 INJECTION INTRAMUSCULAR; INTRAVENOUS at 02:05

## 2018-05-11 RX ADMIN — CLOSTRIDIUM TETANI TOXOID ANTIGEN (FORMALDEHYDE INACTIVATED), CORYNEBACTERIUM DIPHTHERIAE TOXOID ANTIGEN (FORMALDEHYDE INACTIVATED), BORDETELLA PERTUSSIS TOXOID ANTIGEN (GLUTARALDEHYDE INACTIVATED), BORDETELLA PERTUSSIS FILAMENTOUS HEMAGGLUTININ ANTIGEN (FORMALDEHYDE INACTIVATED), BORDETELLA PERTUSSIS PERTACTIN ANTIGEN, AND BORDETELLA PERTUSSIS FIMBRIAE 2/3 ANTIGEN 0.5 ML: 5; 2; 2.5; 5; 3; 5 INJECTION, SUSPENSION INTRAMUSCULAR at 05:05

## 2018-05-11 RX ADMIN — SODIUM CHLORIDE 500 ML: 0.9 INJECTION, SOLUTION INTRAVENOUS at 02:05

## 2018-05-11 RX ADMIN — HYDROMORPHONE HYDROCHLORIDE 0.5 MG: 1 INJECTION, SOLUTION INTRAMUSCULAR; INTRAVENOUS; SUBCUTANEOUS at 07:05

## 2018-05-11 RX ADMIN — AMITRIPTYLINE HYDROCHLORIDE 25 MG: 25 TABLET, FILM COATED ORAL at 09:05

## 2018-05-11 RX ADMIN — ENOXAPARIN SODIUM 40 MG: 100 INJECTION SUBCUTANEOUS at 09:05

## 2018-05-11 RX ADMIN — MUPIROCIN 22 G: 20 OINTMENT TOPICAL at 04:05

## 2018-05-11 RX ADMIN — HYDROMORPHONE HYDROCHLORIDE 0.5 MG: 1 INJECTION, SOLUTION INTRAMUSCULAR; INTRAVENOUS; SUBCUTANEOUS at 02:05

## 2018-05-11 NOTE — ED PROVIDER NOTES
[facility]                                                 Chief Complaint  78 y.o. female with Fall    History of Present Illness  Merari Romero presents to the emergency room with left elbow and hip pain  Pt had a slip and fall at her house with immediate left forearm and hip pain   She denies any head trauma loss of consciousness, cannot walk at this time    The history is provided by the patient  Medical history: DVT, hypertension, neuropathy, osteoporosis  Surgical history: Femur surgery, hysterectomy, joint replacement: Nasal fracture surgery  NO KNOWN DRUG ALLERGIES    Review of Systems and Physical Exam      Review of Systems  -- Constitution - no fever, denies fatigue, no weakness, no chills  -- Eyes - no tearing or redness, no visual disturbance  -- Ear, Nose - no tinnitus or earache, no nasal congestion or discharge  -- Mouth,Throat - no sore throat, no toothache, normal voice, normal swallowing  -- Respiratory - denies cough and congestion, no shortness of breath, no GEE  -- Cardiovascular - denies chest pain, no palpitations, denies claudication  -- Gastrointestinal - denies abdominal pain, nausea, vomiting, or diarrhea  -- Genitourinary - no dysuria, no denies flank pain, no hematuria or frequency   -- Musculoskeletal - left elbow and hip pain after fall today  -- Neurological - no headache, denies weakness or seizure; no LOC  -- Skin - denies pallor, rash, or changes in skin. no hives or welts noted    /66  Pulse 98   Temp 96.5 °F (35.8 °C) (Oral)   Resp 20     Physical Exam  -- Nursing note and vitals reviewed  -- Head: Atraumatic. Normocephalic. No obvious abnormality  -- Eyes: Pupils are equal and reactive to light. Normal conjunctiva and lids  -- Neck: Normal range of motion. Neck supple. No masses, trachea midline  -- Cardiac: Normal rate, regular rhythm and normal heart sounds  -- Pulmonary: Normal respiratory effort, breath sounds clear to auscultation  -- Abdominal:  Soft, no tenderness. Normal bowel sounds. Normal liver edge  -- Musculoskeletal: Left elbow swelling of left groin pain on palpation  -- Neurological: No focal deficits. Showed good interaction with staff  -- Vascular: Posterior tibial, dorsalis pedis and radial pulses 2+ bilaterally    -- Lymphatics: No cervical or peripheral lymphadenopathy. No edema noted  -- Skin: Skin abrasion to the left arm    Emergency Room Course      Labs     K 4.0      CO2 24   BUN 12   CREATININE 0.9    (H)   ALKPHOS 121   AST 45 (H)   ALT 42   BILITOT 0.7   ALBUMIN 3.4 (L)   PROT 7.7   WBC 7.12   HGB 13.7   HCT 40.8      INR 1.1   TSH 1.396     EKG  -- The EKG findings today were without concerning findings from baseline    Radiology  -- Chest x-ray showed no infiltrate and showed no acute pathology   -- Left hip x-ray shows a left inferior ramus and pubic symphysis fracture  -- Left elbow x-ray shows a radial neck fracture with joint effusion    Medications Given  -- sodium chloride 0.9% bolus 500 mL (0 mLs Intravenous Stopped 5/11/18 1602)   -- HYDROmorphone injection 0.5 mg (0.5 mg Intravenous Given 5/11/18 1452)   -- ondansetron injection 4 mg (4 mg Intravenous Given 5/11/18 1452)   -- HYDROmorphone injection 0.5 mg (0.5 mg Intravenous Given 5/11/18 1601)   -- Tdap vaccine injection 0.5 mL (0.5 mLs Intramuscular Given 5/11/18 1714)   -- mupirocin 2 % ointment 22 g (22 g Topical (Top) Given 5/11/18 1645)   -- LORazepam tablet 0.5 mg (0.5 mg Oral Given 5/11/18 1748)     Splint Application  -- Date/Time: 6:13 PM 5/11/2018   -- Performed by: Ton Colindres M.D.  -- Consent Done: Emergent Situation  -- Location details: Left short arm   -- Supplies used: cotton padding and Ortho-Glass  -- Post-procedure: Neurovascularly unchanged following the procedure  -- Patient tolerance: Tolerated the procedure well     Medical Decision Making  -- Diagnosis management comments: 78 y.o. female with Left elbow and hip pain  --  Patient has a radial neck fracture, short arm splint placed by the ER physician   -- I discussed the patient's elbow and pelvic fractures with Dr. Doc Wolf  -- Nonoperative orthopedic injuries, admitted for pain control and PT consult   -- Nonweightbearing at the recommendation of PT consult, bed to chair transfers     Diagnosis  -- Closed nondisplaced fracture of neck of left radius  -- Fall  -- Closed fracture of other parts of pelvis    Disposition and Plan  -- Disposition: observation  -- Condition: stable  -- SCD hoses  -- Home medications  -- Nausea medication when necessary  -- Pain medication when necessary  -- Hep-Lock IV  -- Routine monitoring  -- Bed rest until otherwise stated  -- Low salt diet  -- PT consult for nonweightbearing     This note is dictated on Dragon Natural Speaking word recognition program.  There are word recognition mistakes that are occasionally missed on review.            Ton Colindres MD  05/11/18 7957

## 2018-05-11 NOTE — HPI
78 year old female fell going up stairs today and slipped on first step. Fell on left side. Work up in ED is pos for radial neck fracture and pelvic fractures. I am told that ortho was contacted. Pt is cleared for admit here. She will be admitted for pain control and PT.

## 2018-05-11 NOTE — SUBJECTIVE & OBJECTIVE
Past Medical History:   Diagnosis Date    DVT (deep venous thrombosis)     left leg    Hypertension     Neuropathy     Osteoporosis        Past Surgical History:   Procedure Laterality Date    FEMUR SURGERY      repair - left leg    HYSTERECTOMY  1970's    JOINT REPLACEMENT      left knee    NASAL FRACTURE SURGERY         Review of patient's allergies indicates:   Allergen Reactions    No known drug allergies        No current facility-administered medications on file prior to encounter.      Current Outpatient Prescriptions on File Prior to Encounter   Medication Sig    amitriptyline (ELAVIL) 25 MG tablet Take 25 mg by mouth every evening.    amlodipine (NORVASC) 5 MG tablet take 1 tablet by mouth once daily    atorvastatin (LIPITOR) 20 MG tablet Take 20 mg by mouth once daily.    BREO ELLIPTA 100-25 mcg/dose diskus inhaler Inhale 1 puff into the lungs once daily.     cilostazol (PLETAL) 50 MG Tab Take 50 mg by mouth 2 (two) times daily.    duloxetine (CYMBALTA) 30 MG capsule Take 1 capsule (30 mg total) by mouth once daily.    fexofenadine (ALLEGRA) 60 MG tablet Take 1 tablet (60 mg total) by mouth once daily.    hydrocodone-acetaminophen 7.5-325mg (NORCO) 7.5-325 mg per tablet Take 1 tablet by mouth 3 (three) times daily.    meclizine (ANTIVERT) 25 mg tablet Take 25 mg by mouth every 6 (six) hours.     metoprolol succinate (TOPROL-XL) 50 MG 24 hr tablet     omeprazole (PRILOSEC) 40 MG capsule     ranitidine (ZANTAC) 300 MG tablet     [DISCONTINUED] furosemide (LASIX) 20 MG tablet Take 20 mg by mouth 2 (two) times daily.    ipratropium (ATROVENT) 0.02 % nebulizer solution Take 2.5 mLs (0.5 mg total) by nebulization 4 (four) times daily.    ketoconazole (NIZORAL) 2 % cream     [DISCONTINUED] albuterol (PROVENTIL) 2.5 mg /3 mL (0.083 %) nebulizer solution Take 3 mLs (2.5 mg total) by nebulization every 6 (six) hours as needed.    [DISCONTINUED] folic acid (FOLVITE) 1 MG tablet Take 1 mg by  mouth once daily.     [DISCONTINUED] INCRUSE ELLIPTA 62.5 mcg/actuation DsDv Take 1 puff by mouth once daily.     Family History     Problem Relation (Age of Onset)    Heart disease Mother, Father        Social History Main Topics    Smoking status: Former Smoker     Years: 40.00     Types: Cigarettes     Quit date: 8/9/2000    Smokeless tobacco: Never Used    Alcohol use No    Drug use: No    Sexual activity: Yes     Partners: Male     Birth control/ protection: Surgical     Review of Systems   Constitutional: Negative.  Negative for activity change, appetite change, chills, diaphoresis, fatigue, fever and unexpected weight change.   HENT: Negative.  Negative for congestion, dental problem, drooling, ear discharge, ear pain, facial swelling, hearing loss, mouth sores, nosebleeds, postnasal drip, rhinorrhea, sinus pressure, sneezing, sore throat, tinnitus, trouble swallowing and voice change.    Eyes: Negative.  Negative for photophobia, pain, discharge, redness, itching and visual disturbance.   Respiratory: Negative for apnea, cough, choking, chest tightness, shortness of breath and wheezing.    Cardiovascular: Negative.  Negative for chest pain, palpitations and leg swelling.        Remote h/o unprovoked DVT 7 years ago    Gastrointestinal: Negative.  Negative for abdominal distention, abdominal pain, anal bleeding, blood in stool, constipation, diarrhea, nausea, rectal pain and vomiting.   Genitourinary: Positive for difficulty urinating. Negative for dyspareunia, dysuria, enuresis, flank pain, frequency, hematuria, menstrual problem, pelvic pain, urgency, vaginal bleeding, vaginal discharge and vaginal pain.   Musculoskeletal: Positive for arthralgias and gait problem. Negative for back pain, joint swelling, myalgias, neck pain and neck stiffness.        Per HPI  Not ambulatory      Skin: Negative.  Negative for color change, pallor, rash and wound.   Neurological: Negative for dizziness, tremors,  seizures, syncope, facial asymmetry, speech difficulty, weakness, light-headedness, numbness and headaches.   Hematological: Negative for adenopathy. Does not bruise/bleed easily.   Psychiatric/Behavioral: Negative.  Negative for agitation, behavioral problems, confusion, decreased concentration, dysphoric mood, hallucinations, self-injury, sleep disturbance and suicidal ideas. The patient is not nervous/anxious and is not hyperactive.      Objective:     Vital Signs (Most Recent):  Temp: 99.5 °F (37.5 °C) (05/11/18 1824)  Pulse: 95 (05/11/18 1824)  Resp: 18 (05/11/18 1824)  BP: 137/74 (05/11/18 1824)  SpO2: 97 % (05/11/18 1839) Vital Signs (24h Range):  Temp:  [96.5 °F (35.8 °C)-99.5 °F (37.5 °C)] 99.5 °F (37.5 °C)  Pulse:  [95-98] 95  Resp:  [18-20] 18  SpO2:  [85 %-97 %] 97 %  BP: (122-146)/(66-74) 137/74     Weight: 64 kg (141 lb)  Body mass index is 24.98 kg/m².    Physical Exam   Constitutional: She is oriented to person, place, and time. She appears well-developed and well-nourished.   HENT:   Head: Normocephalic and atraumatic.   Right Ear: External ear normal.   Left Ear: External ear normal.   Nose: Nose normal.   Mouth/Throat: Oropharynx is clear and moist.   Eyes: EOM are normal. Pupils are equal, round, and reactive to light.   Neck: Normal range of motion. Neck supple. No JVD present. No tracheal deviation present. No thyromegaly present.   Cardiovascular: Normal rate, normal heart sounds and intact distal pulses.    No murmur heard.  Pulmonary/Chest: Effort normal and breath sounds normal. No respiratory distress. She has no wheezes. She has no rales. She exhibits no tenderness.   Abdominal: Soft. Bowel sounds are normal. She exhibits no distension and no mass. There is no tenderness. There is no rebound and no guarding.   Musculoskeletal: Normal range of motion. She exhibits no edema or tenderness.   LUE posterior splint with ACE     Left pelvic TTP    Lymphadenopathy:     She has no cervical  adenopathy.   Neurological: She is alert and oriented to person, place, and time. She has normal reflexes. She displays normal reflexes. No cranial nerve deficit. She exhibits normal muscle tone. Coordination normal.   Skin: Skin is warm and dry. No rash noted. No erythema. No pallor.   Psychiatric: She has a normal mood and affect. Her behavior is normal. Judgment and thought content normal.         CRANIAL NERVES     CN III, IV, VI   Pupils are equal, round, and reactive to light.  Extraocular motions are normal.        Significant Labs:   CBC:   Recent Labs  Lab 05/11/18  1445   WBC 7.12   HGB 13.7   HCT 40.8        CMP:   Recent Labs  Lab 05/11/18  1445      K 4.0      CO2 24   *   BUN 12   CREATININE 0.9   CALCIUM 9.3   PROT 7.7   ALBUMIN 3.4*   BILITOT 0.7   ALKPHOS 121   AST 45*   ALT 42   ANIONGAP 11   EGFRNONAA >60     Urine Studies: No results for input(s): COLORU, APPEARANCEUA, PHUR, SPECGRAV, PROTEINUA, GLUCUA, KETONESU, BILIRUBINUA, OCCULTUA, NITRITE, UROBILINOGEN, LEUKOCYTESUR, RBCUA, WBCUA, BACTERIA, SQUAMEPITHEL, HYALINECASTS in the last 48 hours.    Invalid input(s): WRIGHTSUR  All pertinent labs within the past 24 hours have been reviewed.    Significant Imaging: I have reviewed and interpreted all pertinent imaging results/findings within the past 24 hours.

## 2018-05-12 PROCEDURE — 94761 N-INVAS EAR/PLS OXIMETRY MLT: CPT

## 2018-05-12 PROCEDURE — 96375 TX/PRO/DX INJ NEW DRUG ADDON: CPT

## 2018-05-12 PROCEDURE — 99225 PR SUBSEQUENT OBSERVATION CARE,LEVEL II: CPT | Mod: ,,, | Performed by: FAMILY MEDICINE

## 2018-05-12 PROCEDURE — 99900035 HC TECH TIME PER 15 MIN (STAT)

## 2018-05-12 PROCEDURE — 25000242 PHARM REV CODE 250 ALT 637 W/ HCPCS: Performed by: FAMILY MEDICINE

## 2018-05-12 PROCEDURE — 96376 TX/PRO/DX INJ SAME DRUG ADON: CPT

## 2018-05-12 PROCEDURE — 94799 UNLISTED PULMONARY SVC/PX: CPT

## 2018-05-12 PROCEDURE — G8979 MOBILITY GOAL STATUS: HCPCS | Mod: CL

## 2018-05-12 PROCEDURE — 63600175 PHARM REV CODE 636 W HCPCS: Performed by: FAMILY MEDICINE

## 2018-05-12 PROCEDURE — 96372 THER/PROPH/DIAG INJ SC/IM: CPT

## 2018-05-12 PROCEDURE — 94640 AIRWAY INHALATION TREATMENT: CPT

## 2018-05-12 PROCEDURE — 27000221 HC OXYGEN, UP TO 24 HOURS

## 2018-05-12 PROCEDURE — 11000001 HC ACUTE MED/SURG PRIVATE ROOM

## 2018-05-12 PROCEDURE — 25000003 PHARM REV CODE 250: Performed by: SURGERY

## 2018-05-12 PROCEDURE — G0378 HOSPITAL OBSERVATION PER HR: HCPCS

## 2018-05-12 PROCEDURE — 97530 THERAPEUTIC ACTIVITIES: CPT

## 2018-05-12 PROCEDURE — 25000003 PHARM REV CODE 250: Performed by: FAMILY MEDICINE

## 2018-05-12 PROCEDURE — G8978 MOBILITY CURRENT STATUS: HCPCS | Mod: CM

## 2018-05-12 PROCEDURE — 97162 PT EVAL MOD COMPLEX 30 MIN: CPT

## 2018-05-12 RX ORDER — DOCUSATE SODIUM 100 MG/1
100 CAPSULE, LIQUID FILLED ORAL 2 TIMES DAILY
Status: DISCONTINUED | OUTPATIENT
Start: 2018-05-12 | End: 2018-05-17 | Stop reason: HOSPADM

## 2018-05-12 RX ORDER — MORPHINE SULFATE 4 MG/ML
2 INJECTION, SOLUTION INTRAMUSCULAR; INTRAVENOUS EVERY 4 HOURS PRN
Status: DISCONTINUED | OUTPATIENT
Start: 2018-05-12 | End: 2018-05-17 | Stop reason: HOSPADM

## 2018-05-12 RX ADMIN — AMLODIPINE BESYLATE 5 MG: 5 TABLET ORAL at 08:05

## 2018-05-12 RX ADMIN — MORPHINE SULFATE 2 MG: 4 INJECTION INTRAVENOUS at 03:05

## 2018-05-12 RX ADMIN — DOCUSATE SODIUM 100 MG: 100 CAPSULE, LIQUID FILLED ORAL at 08:05

## 2018-05-12 RX ADMIN — MORPHINE SULFATE 2 MG: 4 INJECTION INTRAVENOUS at 08:05

## 2018-05-12 RX ADMIN — HYDROMORPHONE HYDROCHLORIDE 0.5 MG: 1 INJECTION, SOLUTION INTRAMUSCULAR; INTRAVENOUS; SUBCUTANEOUS at 06:05

## 2018-05-12 RX ADMIN — MORPHINE SULFATE 2 MG: 4 INJECTION INTRAVENOUS at 11:05

## 2018-05-12 RX ADMIN — ENOXAPARIN SODIUM 40 MG: 100 INJECTION SUBCUTANEOUS at 06:05

## 2018-05-12 RX ADMIN — IPRATROPIUM BROMIDE AND ALBUTEROL SULFATE 3 ML: 2.5; .5 SOLUTION RESPIRATORY (INHALATION) at 07:05

## 2018-05-12 RX ADMIN — METOPROLOL SUCCINATE 50 MG: 50 TABLET, EXTENDED RELEASE ORAL at 08:05

## 2018-05-12 RX ADMIN — AMITRIPTYLINE HYDROCHLORIDE 25 MG: 25 TABLET, FILM COATED ORAL at 08:05

## 2018-05-12 RX ADMIN — PANTOPRAZOLE SODIUM 40 MG: 40 TABLET, DELAYED RELEASE ORAL at 08:05

## 2018-05-12 RX ADMIN — ATORVASTATIN CALCIUM 20 MG: 20 TABLET, FILM COATED ORAL at 08:05

## 2018-05-12 RX ADMIN — DULOXETINE 30 MG: 30 CAPSULE, DELAYED RELEASE ORAL at 08:05

## 2018-05-12 RX ADMIN — HYDROCODONE BITARTRATE AND ACETAMINOPHEN 1 TABLET: 5; 325 TABLET ORAL at 11:05

## 2018-05-12 RX ADMIN — HYDROCODONE BITARTRATE AND ACETAMINOPHEN 1 TABLET: 5; 325 TABLET ORAL at 08:05

## 2018-05-12 NOTE — PROGRESS NOTES
[facility]  Progress Note    Patient Name: Merari Romero  MRN: [id number]  Patient Class: OP- Observation   Admission Date: 5/11/2018  Length of Stay: 0 days  Attending Physician: Michael Chacon MD  Primary Care Provider: Shaun Barraza MD        Subjective:     Principal Problem:Closed nondisplaced fracture of neck of left radius    HPI:  78 year old female fell going up stairs today and slipped on first step. Fell on left side. Work up in ED is pos for radial neck fracture and pelvic fractures. I am told that ortho was contacted. Pt is cleared for admit here. She will be admitted for pain control and PT.       Hospital Course:  Pain controlled with dilaudid   Sat up with PT this am  She is able to use bedpan but very painful         Interval History: see HC     Review of Systems   Constitutional: Negative for fever.   Respiratory: Negative for shortness of breath.    Cardiovascular: Negative for chest pain.   Genitourinary: Positive for pelvic pain.     Objective:     Vital Signs (Most Recent):  Temp: 97.6 °F (36.4 °C) (05/12/18 0705)  Pulse: 98 (05/12/18 0718)  Resp: 16 (05/12/18 0718)  BP: (!) 107/58 (05/12/18 0705)  SpO2: 95 % (05/12/18 0718) Vital Signs (24h Range):  Temp:  [96.3 °F (35.7 °C)-99.5 °F (37.5 °C)] 97.6 °F (36.4 °C)  Pulse:  [] 98  Resp:  [16-20] 16  SpO2:  [85 %-97 %] 95 %  BP: ()/(58-84) 107/58     Weight: 64 kg (141 lb)  Body mass index is 24.98 kg/m².    Intake/Output Summary (Last 24 hours) at 05/12/18 0943  Last data filed at 05/11/18 2349   Gross per 24 hour   Intake              500 ml   Output              200 ml   Net              300 ml      Physical Exam   Constitutional: She is oriented to person, place, and time. She appears well-developed and well-nourished.   HENT:   Head: Normocephalic and atraumatic.   Right Ear: External ear normal.   Left Ear: External ear normal.   Nose: Nose normal.   Mouth/Throat: Oropharynx is clear and  moist.   Eyes: EOM are normal. Pupils are equal, round, and reactive to light.   Neck: Normal range of motion. Neck supple. No JVD present. No tracheal deviation present. No thyromegaly present.   Cardiovascular: Normal rate, normal heart sounds and intact distal pulses.    No murmur heard.  Pulmonary/Chest: Effort normal and breath sounds normal. No respiratory distress. She has no wheezes. She has no rales. She exhibits no tenderness.   Abdominal: Soft. Bowel sounds are normal. She exhibits no distension and no mass. There is no tenderness. There is no rebound and no guarding.   Musculoskeletal: Normal range of motion. She exhibits no edema or tenderness.   LUE posterior splint with ACE     Left pelvic TTP    Lymphadenopathy:     She has no cervical adenopathy.   Neurological: She is alert and oriented to person, place, and time. She has normal reflexes. She displays normal reflexes. No cranial nerve deficit. She exhibits normal muscle tone. Coordination normal.   Skin: Skin is warm and dry. No rash noted. No erythema. No pallor.   Psychiatric: She has a normal mood and affect. Her behavior is normal. Judgment and thought content normal.       Significant Labs:   CBC:   Recent Labs  Lab 05/11/18  1445   WBC 7.12   HGB 13.7   HCT 40.8        CMP:   Recent Labs  Lab 05/11/18  1445      K 4.0      CO2 24   *   BUN 12   CREATININE 0.9   CALCIUM 9.3   PROT 7.7   ALBUMIN 3.4*   BILITOT 0.7   ALKPHOS 121   AST 45*   ALT 42   ANIONGAP 11   EGFRNONAA >60       Significant Imaging: I have reviewed and interpreted all pertinent imaging results/findings within the past 24 hours.    Assessment/Plan:      * Closed nondisplaced fracture of neck of left radius    Ortho consult   Splinted           Bed confinement status    Ordering PT and nebs q 12 to reduce risk of atelectasis  Incentive spirometer           History of deep venous thrombosis    lovenox 40 daily           Closed fracture of left pubis     Non operative   Pain control   Try and reduce risk of associated complications of limited mobility(IS, nebs, PT)          Neuropathy of lower extremity    Continue TCA and cymbalta          HTN (hypertension)    Continue metoprol and norvasc            VTE Risk Mitigation         Ordered     enoxaparin injection 40 mg  Daily      05/11/18 1848     IP VTE HIGH RISK PATIENT  Once      05/11/18 1807     Place sequential compression device  Until discontinued      05/11/18 1807              Michael Chacon MD  Department of Hospital Medicine   [facility]

## 2018-05-12 NOTE — HOSPITAL COURSE
Pain controlled with dilaudid   Sat up with PT this am  She is able to use bedpan but very painful     5/13  Pain controlled  Stood up with PT today     5/14  She did stand x 2 min with PT x 1, second attempt not successful due to pt fatigue. Her goal with PT : Iintiate walking with platform walker for left UE for short distances 25 ft/ with min assistance . She is using less morphine IV, on norco, received 2 doses with relief. C/o dribbling urine this am, not emptying bladder fully    5/15  She did not get up yesterday as she was having a lot of pain. Spoke with ortho and they rec non wt bearing to left upper ext and weight bearing as tolerated to left lower ext. Her goals are short distance of 25ft with platform walker using min assistance. Her pox is 90-99% on RA. Encouraged to use IS at bedside and continued on nebs every 12hr. She did have issues with urinary hesitancy so u/a collected which looks good and urine culture pending,. No fever. Will try and get hher up to bed side commode to see if that helps.     Still requiring intermittent IV pain meds for break though pain using norco 10mg po.     5/16 Pt was able to transfer to bedside chair yesterday with PT using mod assist. Pt. amb. 5' with hemiwalker, mod. A.  VC's given to pt. for step placement and A.D. placement.  Pt. demonstrated decreased step height, decreased step length, decreased velocity and decreased toe-to-floor clearance. Ot also started with patient.     Bo Urbina saw patient this am. She rec cont with long arm splint to left upper ext and non wt bearing. Also WBAT to left lower ext and f/u Dr Lemos 3 weeks for repeat x rays.     She is still requiriong morphine for break through pain on top the norco 10mg.     POX remains 93-97%, encouraging IS at bedside as well as cont nebvs every 12hr. lovenox as well for DVT prophylaxis    5/17  Pt remains doing well. She actually ambulated 10 ft yesterday with flora walker and mod assist per PT. She  requires min assist with bed mobility and transfers. This is much progress since just yesterday when she was mod assist for all movement and was not ambulating just transfering. Only required morphine once last night otherwise able to tolerate pain with po norco as needed (morphine x 1). VVS/afebrile. POX 92-94% on RA, IS at bedside and again encouraged use. Getting better with IS. Nebs BID. lovenox for DVT prophylaxis    This am c/o nausea.  Took a norco this am at 5 without  Eating.

## 2018-05-12 NOTE — ASSESSMENT & PLAN NOTE
Non operative   Pain control   Try and reduce risk of associated complications of limited mobility(IS, nebs, PT)

## 2018-05-12 NOTE — H&P
[facility] Medicine  History & Physical    Patient Name: Merari Romero  MRN: [id number]  Admission Date: 5/11/2018  Attending Physician: Michael Chacon MD   Primary Care Provider: Shaun Barraza MD         Patient information was obtained from patient and ER records.     Subjective:     Principal Problem:Closed nondisplaced fracture of neck of left radius    Chief Complaint:   Chief Complaint   Patient presents with    Fall        HPI: 78 year old female fell going up stairs today and slipped on first step. Fell on left side. Work up in ED is pos for radial neck fracture and pelvic fractures. I am told that ortho was contacted. Pt is cleared for admit here. She will be admitted for pain control and PT.       Past Medical History:   Diagnosis Date    DVT (deep venous thrombosis)     left leg    Hypertension     Neuropathy     Osteoporosis        Past Surgical History:   Procedure Laterality Date    FEMUR SURGERY      repair - left leg    HYSTERECTOMY  1970's    JOINT REPLACEMENT      left knee    NASAL FRACTURE SURGERY         Review of patient's allergies indicates:   Allergen Reactions    No known drug allergies        No current facility-administered medications on file prior to encounter.      Current Outpatient Prescriptions on File Prior to Encounter   Medication Sig    amitriptyline (ELAVIL) 25 MG tablet Take 25 mg by mouth every evening.    amlodipine (NORVASC) 5 MG tablet take 1 tablet by mouth once daily    atorvastatin (LIPITOR) 20 MG tablet Take 20 mg by mouth once daily.    BREO ELLIPTA 100-25 mcg/dose diskus inhaler Inhale 1 puff into the lungs once daily.     cilostazol (PLETAL) 50 MG Tab Take 50 mg by mouth 2 (two) times daily.    duloxetine (CYMBALTA) 30 MG capsule Take 1 capsule (30 mg total) by mouth once daily.    fexofenadine (ALLEGRA) 60 MG tablet Take 1 tablet (60 mg total) by mouth once daily.    hydrocodone-acetaminophen 7.5-325mg (NORCO)  7.5-325 mg per tablet Take 1 tablet by mouth 3 (three) times daily.    meclizine (ANTIVERT) 25 mg tablet Take 25 mg by mouth every 6 (six) hours.     metoprolol succinate (TOPROL-XL) 50 MG 24 hr tablet     omeprazole (PRILOSEC) 40 MG capsule     ranitidine (ZANTAC) 300 MG tablet     [DISCONTINUED] furosemide (LASIX) 20 MG tablet Take 20 mg by mouth 2 (two) times daily.    ipratropium (ATROVENT) 0.02 % nebulizer solution Take 2.5 mLs (0.5 mg total) by nebulization 4 (four) times daily.    ketoconazole (NIZORAL) 2 % cream     [DISCONTINUED] albuterol (PROVENTIL) 2.5 mg /3 mL (0.083 %) nebulizer solution Take 3 mLs (2.5 mg total) by nebulization every 6 (six) hours as needed.    [DISCONTINUED] folic acid (FOLVITE) 1 MG tablet Take 1 mg by mouth once daily.     [DISCONTINUED] INCRUSE ELLIPTA 62.5 mcg/actuation DsDv Take 1 puff by mouth once daily.     Family History     Problem Relation (Age of Onset)    Heart disease Mother, Father        Social History Main Topics    Smoking status: Former Smoker     Years: 40.00     Types: Cigarettes     Quit date: 8/9/2000    Smokeless tobacco: Never Used    Alcohol use No    Drug use: No    Sexual activity: Yes     Partners: Male     Birth control/ protection: Surgical     Review of Systems   Constitutional: Negative.  Negative for activity change, appetite change, chills, diaphoresis, fatigue, fever and unexpected weight change.   HENT: Negative.  Negative for congestion, dental problem, drooling, ear discharge, ear pain, facial swelling, hearing loss, mouth sores, nosebleeds, postnasal drip, rhinorrhea, sinus pressure, sneezing, sore throat, tinnitus, trouble swallowing and voice change.    Eyes: Negative.  Negative for photophobia, pain, discharge, redness, itching and visual disturbance.   Respiratory: Negative for apnea, cough, choking, chest tightness, shortness of breath and wheezing.    Cardiovascular: Negative.  Negative for chest pain, palpitations and leg  swelling.        Remote h/o unprovoked DVT 7 years ago    Gastrointestinal: Negative.  Negative for abdominal distention, abdominal pain, anal bleeding, blood in stool, constipation, diarrhea, nausea, rectal pain and vomiting.   Genitourinary: Positive for difficulty urinating. Negative for dyspareunia, dysuria, enuresis, flank pain, frequency, hematuria, menstrual problem, pelvic pain, urgency, vaginal bleeding, vaginal discharge and vaginal pain.   Musculoskeletal: Positive for arthralgias and gait problem. Negative for back pain, joint swelling, myalgias, neck pain and neck stiffness.        Per HPI  Not ambulatory      Skin: Negative.  Negative for color change, pallor, rash and wound.   Neurological: Negative for dizziness, tremors, seizures, syncope, facial asymmetry, speech difficulty, weakness, light-headedness, numbness and headaches.   Hematological: Negative for adenopathy. Does not bruise/bleed easily.   Psychiatric/Behavioral: Negative.  Negative for agitation, behavioral problems, confusion, decreased concentration, dysphoric mood, hallucinations, self-injury, sleep disturbance and suicidal ideas. The patient is not nervous/anxious and is not hyperactive.      Objective:     Vital Signs (Most Recent):  Temp: 99.5 °F (37.5 °C) (05/11/18 1824)  Pulse: 95 (05/11/18 1824)  Resp: 18 (05/11/18 1824)  BP: 137/74 (05/11/18 1824)  SpO2: 97 % (05/11/18 1839) Vital Signs (24h Range):  Temp:  [96.5 °F (35.8 °C)-99.5 °F (37.5 °C)] 99.5 °F (37.5 °C)  Pulse:  [95-98] 95  Resp:  [18-20] 18  SpO2:  [85 %-97 %] 97 %  BP: (122-146)/(66-74) 137/74     Weight: 64 kg (141 lb)  Body mass index is 24.98 kg/m².    Physical Exam   Constitutional: She is oriented to person, place, and time. She appears well-developed and well-nourished.   HENT:   Head: Normocephalic and atraumatic.   Right Ear: External ear normal.   Left Ear: External ear normal.   Nose: Nose normal.   Mouth/Throat: Oropharynx is clear and moist.   Eyes: EOM  are normal. Pupils are equal, round, and reactive to light.   Neck: Normal range of motion. Neck supple. No JVD present. No tracheal deviation present. No thyromegaly present.   Cardiovascular: Normal rate, normal heart sounds and intact distal pulses.    No murmur heard.  Pulmonary/Chest: Effort normal and breath sounds normal. No respiratory distress. She has no wheezes. She has no rales. She exhibits no tenderness.   Abdominal: Soft. Bowel sounds are normal. She exhibits no distension and no mass. There is no tenderness. There is no rebound and no guarding.   Musculoskeletal: Normal range of motion. She exhibits no edema or tenderness.   LUE posterior splint with ACE     Left pelvic TTP    Lymphadenopathy:     She has no cervical adenopathy.   Neurological: She is alert and oriented to person, place, and time. She has normal reflexes. She displays normal reflexes. No cranial nerve deficit. She exhibits normal muscle tone. Coordination normal.   Skin: Skin is warm and dry. No rash noted. No erythema. No pallor.   Psychiatric: She has a normal mood and affect. Her behavior is normal. Judgment and thought content normal.         CRANIAL NERVES     CN III, IV, VI   Pupils are equal, round, and reactive to light.  Extraocular motions are normal.        Significant Labs:   CBC:   Recent Labs  Lab 05/11/18  1445   WBC 7.12   HGB 13.7   HCT 40.8        CMP:   Recent Labs  Lab 05/11/18  1445      K 4.0      CO2 24   *   BUN 12   CREATININE 0.9   CALCIUM 9.3   PROT 7.7   ALBUMIN 3.4*   BILITOT 0.7   ALKPHOS 121   AST 45*   ALT 42   ANIONGAP 11   EGFRNONAA >60     Urine Studies: No results for input(s): COLORU, APPEARANCEUA, PHUR, SPECGRAV, PROTEINUA, GLUCUA, KETONESU, BILIRUBINUA, OCCULTUA, NITRITE, UROBILINOGEN, LEUKOCYTESUR, RBCUA, WBCUA, BACTERIA, SQUAMEPITHEL, HYALINECASTS in the last 48 hours.    Invalid input(s): WRIGHTSUR  All pertinent labs within the past 24 hours have been  reviewed.    Significant Imaging: I have reviewed and interpreted all pertinent imaging results/findings within the past 24 hours.    Assessment/Plan:     * Closed nondisplaced fracture of neck of left radius    Ortho consult   Splinted           Bed confinement status    Ordering PT and nebs q 12 to reduce risk of atelectasis  Incentive spirometer           History of deep venous thrombosis    lovenox 40 daily           Closed fracture of left pubis    Non operative   Pain control   Try and reduce risk of associated complications of limited mobility          Neuropathy of lower extremity    Continue TCA and cymbalta          HTN (hypertension)    Continue metoprol and norvasc            VTE Risk Mitigation         Ordered     enoxaparin injection 40 mg  Daily      05/11/18 1848     IP VTE HIGH RISK PATIENT  Once      05/11/18 1807     Place sequential compression device  Until discontinued      05/11/18 1807             Michael Chacon MD  Department of Hospital Medicine   [facility]

## 2018-05-12 NOTE — NURSING
"Report received from Lisa BROOKS. Pt lying in bed with family at bedside. Left arm in sling; fingers warm and denies numbness and tingling to hand. Normal pedal pulses noted. Pt complains of "slight pain" to left arm; states "feels ok if not moving". Reinforced need for turning every 2 hours to avoid breakdown and deep breathing and IS to prevent pneumonia. Pt and step daughter state agreement. Call bell within reach.   "

## 2018-05-12 NOTE — PROGRESS NOTES
Staff Handoff    Bedside report received from CECILIA Mijares. Patient lying in bed. No distress noted. VS stable. Denies pain. Bed locked and in lowest position. Call bell in reach.   Resident Handoff

## 2018-05-12 NOTE — SUBJECTIVE & OBJECTIVE
Interval History: see      Review of Systems   Constitutional: Negative for fever.   Respiratory: Negative for shortness of breath.    Cardiovascular: Negative for chest pain.   Genitourinary: Positive for pelvic pain.     Objective:     Vital Signs (Most Recent):  Temp: 97.6 °F (36.4 °C) (05/12/18 0705)  Pulse: 98 (05/12/18 0718)  Resp: 16 (05/12/18 0718)  BP: (!) 107/58 (05/12/18 0705)  SpO2: 95 % (05/12/18 0718) Vital Signs (24h Range):  Temp:  [96.3 °F (35.7 °C)-99.5 °F (37.5 °C)] 97.6 °F (36.4 °C)  Pulse:  [] 98  Resp:  [16-20] 16  SpO2:  [85 %-97 %] 95 %  BP: ()/(58-84) 107/58     Weight: 64 kg (141 lb)  Body mass index is 24.98 kg/m².    Intake/Output Summary (Last 24 hours) at 05/12/18 0943  Last data filed at 05/11/18 2349   Gross per 24 hour   Intake              500 ml   Output              200 ml   Net              300 ml      Physical Exam   Constitutional: She is oriented to person, place, and time. She appears well-developed and well-nourished.   HENT:   Head: Normocephalic and atraumatic.   Right Ear: External ear normal.   Left Ear: External ear normal.   Nose: Nose normal.   Mouth/Throat: Oropharynx is clear and moist.   Eyes: EOM are normal. Pupils are equal, round, and reactive to light.   Neck: Normal range of motion. Neck supple. No JVD present. No tracheal deviation present. No thyromegaly present.   Cardiovascular: Normal rate, normal heart sounds and intact distal pulses.    No murmur heard.  Pulmonary/Chest: Effort normal and breath sounds normal. No respiratory distress. She has no wheezes. She has no rales. She exhibits no tenderness.   Abdominal: Soft. Bowel sounds are normal. She exhibits no distension and no mass. There is no tenderness. There is no rebound and no guarding.   Musculoskeletal: Normal range of motion. She exhibits no edema or tenderness.   LUE posterior splint with ACE     Left pelvic TTP    Lymphadenopathy:     She has no cervical adenopathy.    Neurological: She is alert and oriented to person, place, and time. She has normal reflexes. She displays normal reflexes. No cranial nerve deficit. She exhibits normal muscle tone. Coordination normal.   Skin: Skin is warm and dry. No rash noted. No erythema. No pallor.   Psychiatric: She has a normal mood and affect. Her behavior is normal. Judgment and thought content normal.       Significant Labs:   CBC:   Recent Labs  Lab 05/11/18  1445   WBC 7.12   HGB 13.7   HCT 40.8        CMP:   Recent Labs  Lab 05/11/18  1445      K 4.0      CO2 24   *   BUN 12   CREATININE 0.9   CALCIUM 9.3   PROT 7.7   ALBUMIN 3.4*   BILITOT 0.7   ALKPHOS 121   AST 45*   ALT 42   ANIONGAP 11   EGFRNONAA >60       Significant Imaging: I have reviewed and interpreted all pertinent imaging results/findings within the past 24 hours.

## 2018-05-12 NOTE — ASSESSMENT & PLAN NOTE
Non operative   Pain control   Try and reduce risk of associated complications of limited mobility

## 2018-05-12 NOTE — PT/OT/SLP EVAL
Physical Therapy Evaluation    Patient Name:  Merari Romero   MRN:  [id number]    Recommendations:     Discharge Recommendations:  home with home health, home health PT, home health OT   Discharge Equipment Recommendations:  (platform walker)   Barriers to discharge: None    Assessment:     Merari Romero is a 78 y.o. female admitted with a medical diagnosis of Closed nondisplaced fracture of neck of left radius.  She presents with the following impairments/functional limitations:  weakness, impaired endurance, impaired self care skills, impaired functional mobilty, impaired balance, gait instability, decreased upper extremity function, decreased lower extremity function, pain, orthopedic precautions .    Rehab Prognosis:  Good minus; patient would benefit from acute skilled PT services to address these deficits and reach maximum level of function.      Recent Surgery: * No surgery found *      Plan:     During this hospitalization, patient to be seen   to address the above listed problems via gait training, therapeutic activities, therapeutic exercises  · Plan of Care Expires:      Plan of Care Reviewed with: patient, spouse    Subjective     Communicated with patient and spouse prior to session.  Patient found supine with left arm sling and elevation , left leg pillow beneath lt knee   upon PT entry to room, agreeable to evaluation.      Chief Complaint: Pain in left elbow and arm,. Pain in left pelvis area  Patient comments/goals:  To heal and walk again  Pain/Comfort:  · Pain Rating 1: 3/10  · Location - Side 1: Left  · Location - Orientation 1: midline  · Location 1:  (pelvis)  · Pain Addressed 1: Reposition  · Pain Rating Post-Intervention 1: 3/10    Patients cultural, spiritual, Baptism conflicts given the current situation:      Living Environment:  At home with spouse  Prior to admission, patients level of function was independenrt.  Patient has the following equipment: none.  DME owned (not currently used): .   Upon discharge, patient will have assistance from spouse and family  Objective:     Patient found with: bed alarm, peripheral IV, telemetry     General Precautions: Standard, fall   Orthopedic Precautions:LLE toe touch weight bearing   Braces: UE Sling     Exams:  · Cognitive Exam:  Patient is oriented to Person, Place, Time and Situation and follows 100% of verbal and tactile commands   · Gross Motor Coordination:  deficit LLE due to pelvis fx. deficit.Left elbow deficit  due to fracture  · RUE ROM: WFL except shoulder due to total shoulder replacement  · RUE Strength: WFL  · LUE ROM: WFL except deficit due to  left elbow Fracture  · LUE Strength: Deficits: limited due to elbow fracture  · RLE ROM: WFL  · RLE Strength: WFL  · LLE ROM: WFL except limited hip flexion and abduction - pelvis fx.  · LLE Strength: Deficits: grossly evaluated at 3 + / 5    Functional Mobility:  · Bed Mobility:     · Rolling Left:  moderate assistance  · Rolling Right: moderate assistance  · Scooting: moderate assistance  · Bridging: total assistance  · Supine to Sit: maximal assistance  · Sit to Supine: moderate assistance  · Balance: Static sitting balance at edge of bed: fair plus    AM-PAC 6 CLICK MOBILITY  Total Score:9       Therapeutic Activities and Exercises:   Gentle assistive exercises for Rt U.E and Bilateral Lower Extremities within functional tolerances by patient  Transfer assistance to sit at edge of bed for endurance.    Patient left Head of bed at comfort . Pillow beneath left arm Pillow beneath left Knee. All lines intact. Calll button in reach , Spouse present, NSG notified, Bed alarn on with encouragenent to move left leg  and perform ankle pumps..    GOALS:    Physical Therapy Goals        Problem: Physical Therapy Goal    Goal Priority Disciplines Outcome Goal Variances Interventions   Physical Therapy Goal     PT/OT, PT      Description:  Pt will advance recovery following  current left elbow and pelvic fractures  by:  1. Assist with bed mobility leading to contact guard assist   2. Increase self help and therapeutic exercises for all extremities except left elbow  3  Progress to stand and transfer tasks with min assist   4. Iintiate walking with platform walker for left UE for short distances 25 ft/ with min assistance                     History:     Past Medical History:   Diagnosis Date    DVT (deep venous thrombosis)     left leg    Hypertension     Neuropathy     Osteoporosis        Past Surgical History:   Procedure Laterality Date    FEMUR SURGERY      repair - left leg    HYSTERECTOMY  1970's    JOINT REPLACEMENT      left knee    NASAL FRACTURE SURGERY         Clinical Decision Making:     History  Co-morbidities and personal factors that may impact the plan of care Examination  Body Structures and Functions, activity limitations and participation restrictions that may impact the plan of care Clinical Presentation   Decision Making/ Complexity Score   Co-morbidities:   [] Time since onset of injury / illness / exacerbation  [x] Status of current condition  []Patient's cognitive status and safety concerns    [] Multiple Medical Problems (see med hx)  Personal Factors:   [x] Patient's age  [] Prior Level of function   [] Patient's home situation (environment and family support)  [] Patient's level of motivation  [x] Expected progression of patient      HISTORY:(criteria)    [] 39955 - no personal factors/history    [x] 00809 - has 1-2 personal factor/comorbidity     [] 24800 - has >3 personal factor/comorbidity     Body Regions:  [] Objective examination findings  [] Head     []  Neck  [x] Trunk   [x] Upper Extremity  [x] Lower Extremity    Body Systems:  [] For communication ability, affect, cognition, language, and learning style: the assessment of the ability to make needs known, consciousness, orientation (person, place, and time), expected emotional /behavioral responses, and learning preferences (eg,  learning barriers, education  needs)  [x] For the neuromuscular system: a general assessment of gross coordinated movement (eg, balance, gait, locomotion, transfers, and transitions) and motor function  (motor control and motor learning)  [x] For the musculoskeletal system: the assessment of gross symmetry, gross range of motion, gross strength, height, and weight  [] For the integumentary system: the assessment of pliability(texture), presence of scar formation, skin color, and skin integrity  [] For cardiovascular/pulmonary system: the assessment of heart rate, respiratory rate, blood pressure, and edema     Activity limitations:    [] Patient's cognitive status and saf ety concerns          [x] Status of current condition      [x] Weight bearing restriction  [] Cardiopulmunary Restriction    Participation Restrictions:   [] Goals and goal agreement with the patient     [x] Rehab potential (prognosis) and probable outcome      Examination of Body System: (criteria)    [x] 23952 - addressing 1-2 elements    [] 23787 - addressing a total of 3 or more elements     [] 33928 -  Addressing a total of 4 or more elements         Clinical Presentation: (criteria)  Stable - 94303     On examination of body system using standardized tests and measures patient presents with 1-2 elements from any of the following: body structures and functions, activity limitations, and/or participation restrictions.  Leading to a clinical presentation that is considered stable and/or uncomplicated                              Clinical Decision Making  (Eval Complexity):  Low- 38142     Time Tracking:     PT Received On: 05/12/18  PT Start Time: 0900     PT Stop Time: 0925  PT Total Time (min): 25 min     Billable Minutes: Evaluation 15 min, Therapeutic Activity 10 min and Total Time 25      Viral Lemos, PT  05/12/2018

## 2018-05-12 NOTE — PLAN OF CARE
Problem: Patient Care Overview  Goal: Plan of Care Review  Outcome: Ongoing (interventions implemented as appropriate)  Pt admitted for PT and pain control. Left arm in sling; neuro cks wnl. Sat on side of bed with physical therapy this morning. Turning every 2 hours; using pillows to protect pressure points. Heel booties on and elevated off bed using a pillow.  Occasional complaints of pain, only with movement or positioning. IV and PO medication ordered. Started on stool softener BID due to significant decrease in mobility and use of pain medication. Encouraging use of IS; placed at bedside and monitoring use atleast every 4 hours. Patient using bedpan with assist. Vitals stable. Oxygen sat wnl on room air today. Call bell within reach. Bed in low, locked position. Reviewed plan of care with pt and ; state agreement.

## 2018-05-13 PROCEDURE — 27000221 HC OXYGEN, UP TO 24 HOURS

## 2018-05-13 PROCEDURE — G0378 HOSPITAL OBSERVATION PER HR: HCPCS

## 2018-05-13 PROCEDURE — 97110 THERAPEUTIC EXERCISES: CPT

## 2018-05-13 PROCEDURE — 63600175 PHARM REV CODE 636 W HCPCS: Performed by: FAMILY MEDICINE

## 2018-05-13 PROCEDURE — 99900035 HC TECH TIME PER 15 MIN (STAT)

## 2018-05-13 PROCEDURE — 96376 TX/PRO/DX INJ SAME DRUG ADON: CPT

## 2018-05-13 PROCEDURE — 11000001 HC ACUTE MED/SURG PRIVATE ROOM

## 2018-05-13 PROCEDURE — 25000003 PHARM REV CODE 250: Performed by: SURGERY

## 2018-05-13 PROCEDURE — 25000003 PHARM REV CODE 250: Performed by: FAMILY MEDICINE

## 2018-05-13 PROCEDURE — 96372 THER/PROPH/DIAG INJ SC/IM: CPT

## 2018-05-13 PROCEDURE — 94761 N-INVAS EAR/PLS OXIMETRY MLT: CPT

## 2018-05-13 PROCEDURE — 97530 THERAPEUTIC ACTIVITIES: CPT

## 2018-05-13 PROCEDURE — 25000242 PHARM REV CODE 250 ALT 637 W/ HCPCS: Performed by: FAMILY MEDICINE

## 2018-05-13 PROCEDURE — 94799 UNLISTED PULMONARY SVC/PX: CPT

## 2018-05-13 PROCEDURE — 94640 AIRWAY INHALATION TREATMENT: CPT

## 2018-05-13 PROCEDURE — 99225 PR SUBSEQUENT OBSERVATION CARE,LEVEL II: CPT | Mod: ,,, | Performed by: FAMILY MEDICINE

## 2018-05-13 RX ADMIN — IPRATROPIUM BROMIDE AND ALBUTEROL SULFATE 3 ML: 2.5; .5 SOLUTION RESPIRATORY (INHALATION) at 07:05

## 2018-05-13 RX ADMIN — MORPHINE SULFATE 2 MG: 4 INJECTION INTRAVENOUS at 01:05

## 2018-05-13 RX ADMIN — AMLODIPINE BESYLATE 5 MG: 5 TABLET ORAL at 09:05

## 2018-05-13 RX ADMIN — HYDROCODONE BITARTRATE AND ACETAMINOPHEN 1 TABLET: 5; 325 TABLET ORAL at 04:05

## 2018-05-13 RX ADMIN — DULOXETINE 30 MG: 30 CAPSULE, DELAYED RELEASE ORAL at 09:05

## 2018-05-13 RX ADMIN — AMITRIPTYLINE HYDROCHLORIDE 25 MG: 25 TABLET, FILM COATED ORAL at 09:05

## 2018-05-13 RX ADMIN — ENOXAPARIN SODIUM 40 MG: 100 INJECTION SUBCUTANEOUS at 04:05

## 2018-05-13 RX ADMIN — HYDROCODONE BITARTRATE AND ACETAMINOPHEN 1 TABLET: 5; 325 TABLET ORAL at 09:05

## 2018-05-13 RX ADMIN — ATORVASTATIN CALCIUM 20 MG: 20 TABLET, FILM COATED ORAL at 09:05

## 2018-05-13 RX ADMIN — PANTOPRAZOLE SODIUM 40 MG: 40 TABLET, DELAYED RELEASE ORAL at 09:05

## 2018-05-13 RX ADMIN — METOPROLOL SUCCINATE 50 MG: 50 TABLET, EXTENDED RELEASE ORAL at 09:05

## 2018-05-13 RX ADMIN — DOCUSATE SODIUM 100 MG: 100 CAPSULE, LIQUID FILLED ORAL at 09:05

## 2018-05-13 NOTE — PT/OT/SLP PROGRESS
Physical Therapy Treatment    Patient Name:  Merari Romero   MRN:  [id number]    Recommendations:     Discharge Recommendations:  home with home health, home health PT, home health OT   Discharge Equipment Recommendations: wheelchair (platform walker)   Barriers to discharge: None    Assessment:     Merari Romero is a 78 y.o. female admitted with a medical diagnosis of Closed nondisplaced fracture of neck of left radius.  She presents with the following impairments/functional limitations:  weakness, impaired endurance, impaired self care skills, impaired functional mobilty, gait instability, decreased upper extremity function, decreased lower extremity function, pain, orthopedic precautions .    Rehab Prognosis:  Good minus; patient would benefit from acute skilled PT services to address these deficits and reach maximum level of function.      Recent Surgery: * No surgery found *      Plan:     During this hospitalization, patient to be seen   to address the above listed problems via gait training, therapeutic activities, therapeutic exercises  · Plan of Care Expires:      Plan of Care Reviewed with: patient, family    Subjective     Communicated with patient and family  prior to session.  Patient found supine upon PT entry to room, agreeable to treatment.      Chief Complaint: sacral ulcer, pelvis pain and left elbow  Fracture  Patient comments/goals: willing to try to get up and stand into walker  Pain/Comfort:  · Pain Rating 1: 3/10  · Location - Side 1: Left  · Location - Orientation 1: midline  · Location 1:  (pelvis)  · Pain Addressed 1: Reposition  · Pain Rating Post-Intervention 1: 3/10    Patients cultural, spiritual, Hoahaoism conflicts given the current situation:      Objective:     Patient found with: bed alarm, peripheral IV     General Precautions: Standard, fall   Orthopedic Precautions:LLE toe touch weight bearing   Braces: UE Sling     Functional Mobility:  · Bed Mobility:     · Rolling Left:  moderate  assistance  · Rolling Right: moderate assistance  · Scooting: moderate assistance  · Bridging: total assistance  · Supine to Sit: moderate assistance  · Sit to Supine: moderate assistance  · Transfers:     · Sit to Stand:  moderate assistance with platform walker      AM-PAC 6 CLICK MOBILITY          Therapeutic Activities and Exercises:   Ttransfer activities, sitting at edge of bed for endurance.   To stand into platform walker with moderate help for 2 minutes duration of endurance ,   Second attempt not successful- fatigue by patient   Instructed patient  and family  and assisted patient with active and assistive exercises to both lower extremities to advance motion and NM control  of BLE.    Patient left HOB elevated with all lines intact, call button in reach, bed alarm on, NSG notified and family present..    GOALS:    Physical Therapy Goals        Problem: Physical Therapy Goal    Goal Priority Disciplines Outcome Goal Variances Interventions   Physical Therapy Goal     PT/OT, PT      Description:  Pt will advance recovery following  current left elbow and pelvic fractures by:  1. Assist with bed mobility leading to contact guard assist   2. Increase self help and therapeutic exercises for all extremities except left elbow  3  Progress to stand and transfer tasks with min assist   4. Iintiate walking with platform walker for left UE for short distances 25 ft/ with min assistance            Problem: Physical Therapy Goal    Goal Priority Disciplines Outcome Goal Variances Interventions   Physical Therapy Goal     PT/OT, PT      Description:  Advancing activities to accomplish anticiapted goals                    Time Tracking:     PT Received On: 05/13/18  PT Start Time: 0826     PT Stop Time: 0851  PT Total Time (min): 25 min     Billable Minutes: Therapeutic Activity 15 min, Therapeutic Exercise 10 min and Total Time 25 min    Treatment Type: Treatment  PT/PTA: PT           Viral Lemos, PT  05/13/2018

## 2018-05-13 NOTE — PLAN OF CARE
Problem: Patient Care Overview  Goal: Plan of Care Review  Outcome: Ongoing (interventions implemented as appropriate)  Patient aware of plan of care. VS stable, afebrile. Patient didn't rest well. C/o left arm and hip pain, moderate relief with morphine and hydrocodone. Patient able to get on fracture pan with assistance, tolerated well. Voided without difficulty. Respiratory obtained SpO2 at 88%, patient put on 2L O2, sats up to lower 90's. Patient denies SOB. Heel booties in place. BLE elevated with pillow. Turn every 2 hours. Free from falls/injuries. No questions or concerns at this time. Agrees with plan of care.

## 2018-05-13 NOTE — PROGRESS NOTES
Staff Handoff  Bedside report received from CECILIA Mijares. Patient lying in bed. VS stable. C/o hip pain rated 10/10. Family at bedside. Bed locked in lowest position. Call bell in reach.     Resident Handoff

## 2018-05-13 NOTE — PROGRESS NOTES
[facility] Hospital Medicine  Progress Note    Patient Name: Merari Romero  MRN: [id number]  Patient Class: OP- Observation   Admission Date: 5/11/2018  Length of Stay: 0 days  Attending Physician: Michael Chacon MD  Primary Care Provider: Shaun Barraza MD        Subjective:     Principal Problem:Closed nondisplaced fracture of neck of left radius    HPI:  78 year old female fell going up stairs today and slipped on first step. Fell on left side. Work up in ED is pos for radial neck fracture and pelvic fractures. I am told that ortho was contacted. Pt is cleared for admit here. She will be admitted for pain control and PT.       Hospital Course:  Pain controlled with dilaudid   Sat up with PT this am  She is able to use bedpan but very painful     5/13  Pain controlled  Stood up with PT today       Interval History: see HC     Review of Systems   Constitutional: Negative for chills and fever.   Respiratory: Negative for shortness of breath.    Cardiovascular: Negative for chest pain and palpitations.   Gastrointestinal: Negative for abdominal pain, blood in stool, constipation and nausea.   Genitourinary: Positive for pelvic pain. Negative for difficulty urinating.   Psychiatric/Behavioral: Negative for dysphoric mood, sleep disturbance and suicidal ideas. The patient is not nervous/anxious.      Objective:     Vital Signs (Most Recent):  Temp: 97 °F (36.1 °C) (05/13/18 0736)  Pulse: 76 (05/13/18 0744)  Resp: 16 (05/13/18 0744)  BP: 108/60 (05/13/18 0736)  SpO2: (!) 94 % (05/13/18 0744) Vital Signs (24h Range):  Temp:  [96.6 °F (35.9 °C)-100.5 °F (38.1 °C)] 97 °F (36.1 °C)  Pulse:  [] 76  Resp:  [16-18] 16  SpO2:  [86 %-98 %] 94 %  BP: (108-142)/(59-82) 108/60     Weight: 64 kg (141 lb)  Body mass index is 24.98 kg/m².    Intake/Output Summary (Last 24 hours) at 05/13/18 1108  Last data filed at 05/13/18 0800   Gross per 24 hour   Intake                0 ml   Output              700 ml    Net             -700 ml      Physical Exam   Constitutional: She is oriented to person, place, and time. She appears well-developed and well-nourished.   HENT:   Head: Normocephalic and atraumatic.   Right Ear: External ear normal.   Left Ear: External ear normal.   Nose: Nose normal.   Mouth/Throat: Oropharynx is clear and moist.   Eyes: EOM are normal. Pupils are equal, round, and reactive to light.   Neck: Normal range of motion. Neck supple. No JVD present. No tracheal deviation present. No thyromegaly present.   Cardiovascular: Normal rate, normal heart sounds and intact distal pulses.    No murmur heard.  Pulmonary/Chest: Effort normal and breath sounds normal. No respiratory distress. She has no wheezes. She has no rales. She exhibits no tenderness.   Abdominal: Soft. Bowel sounds are normal. She exhibits no distension and no mass. There is no tenderness. There is no rebound and no guarding.   Musculoskeletal: Normal range of motion. She exhibits no edema or tenderness.   LUE posterior splint with ACE     Left pelvic TTP    Lymphadenopathy:     She has no cervical adenopathy.   Neurological: She is alert and oriented to person, place, and time. She has normal reflexes. She displays normal reflexes. No cranial nerve deficit. She exhibits normal muscle tone. Coordination normal.   Skin: Skin is warm and dry. No rash noted. No erythema. No pallor.   Psychiatric: She has a normal mood and affect. Her behavior is normal. Judgment and thought content normal.       Significant Labs:   CBC:   Recent Labs  Lab 05/11/18  1445   WBC 7.12   HGB 13.7   HCT 40.8        CMP:   Recent Labs  Lab 05/11/18  1445      K 4.0      CO2 24   *   BUN 12   CREATININE 0.9   CALCIUM 9.3   PROT 7.7   ALBUMIN 3.4*   BILITOT 0.7   ALKPHOS 121   AST 45*   ALT 42   ANIONGAP 11   EGFRNONAA >60       Significant Imaging: I have reviewed and interpreted all pertinent imaging results/findings within the past 24  hours.    Assessment/Plan:      * Closed nondisplaced fracture of neck of left radius    Ortho consult   Splinted           Bed confinement status    Ordering PT and nebs q 12 to reduce risk of atelectasis  Incentive spirometer           History of deep venous thrombosis    lovenox 40 daily           Closed fracture of left pubis    Non operative   Pain control   Try and reduce risk of associated complications of limited mobility(IS, nebs, PT)          Neuropathy of lower extremity    Continue TCA and cymbalta          HTN (hypertension)    Continue metoprol and norvasc            VTE Risk Mitigation         Ordered     enoxaparin injection 40 mg  Daily      05/11/18 1848     IP VTE HIGH RISK PATIENT  Once      05/11/18 1807     Place sequential compression device  Until discontinued      05/11/18 1807              Michael Chacon MD  Department of Hospital Medicine   [facility]

## 2018-05-13 NOTE — SUBJECTIVE & OBJECTIVE
Interval History: see      Review of Systems   Constitutional: Negative for chills and fever.   Respiratory: Negative for shortness of breath.    Cardiovascular: Negative for chest pain and palpitations.   Gastrointestinal: Negative for abdominal pain, blood in stool, constipation and nausea.   Genitourinary: Positive for pelvic pain. Negative for difficulty urinating.   Psychiatric/Behavioral: Negative for dysphoric mood, sleep disturbance and suicidal ideas. The patient is not nervous/anxious.      Objective:     Vital Signs (Most Recent):  Temp: 97 °F (36.1 °C) (05/13/18 0736)  Pulse: 76 (05/13/18 0744)  Resp: 16 (05/13/18 0744)  BP: 108/60 (05/13/18 0736)  SpO2: (!) 94 % (05/13/18 0744) Vital Signs (24h Range):  Temp:  [96.6 °F (35.9 °C)-100.5 °F (38.1 °C)] 97 °F (36.1 °C)  Pulse:  [] 76  Resp:  [16-18] 16  SpO2:  [86 %-98 %] 94 %  BP: (108-142)/(59-82) 108/60     Weight: 64 kg (141 lb)  Body mass index is 24.98 kg/m².    Intake/Output Summary (Last 24 hours) at 05/13/18 1108  Last data filed at 05/13/18 0800   Gross per 24 hour   Intake                0 ml   Output              700 ml   Net             -700 ml      Physical Exam   Constitutional: She is oriented to person, place, and time. She appears well-developed and well-nourished.   HENT:   Head: Normocephalic and atraumatic.   Right Ear: External ear normal.   Left Ear: External ear normal.   Nose: Nose normal.   Mouth/Throat: Oropharynx is clear and moist.   Eyes: EOM are normal. Pupils are equal, round, and reactive to light.   Neck: Normal range of motion. Neck supple. No JVD present. No tracheal deviation present. No thyromegaly present.   Cardiovascular: Normal rate, normal heart sounds and intact distal pulses.    No murmur heard.  Pulmonary/Chest: Effort normal and breath sounds normal. No respiratory distress. She has no wheezes. She has no rales. She exhibits no tenderness.   Abdominal: Soft. Bowel sounds are normal. She exhibits no  distension and no mass. There is no tenderness. There is no rebound and no guarding.   Musculoskeletal: Normal range of motion. She exhibits no edema or tenderness.   LUE posterior splint with ACE     Left pelvic TTP    Lymphadenopathy:     She has no cervical adenopathy.   Neurological: She is alert and oriented to person, place, and time. She has normal reflexes. She displays normal reflexes. No cranial nerve deficit. She exhibits normal muscle tone. Coordination normal.   Skin: Skin is warm and dry. No rash noted. No erythema. No pallor.   Psychiatric: She has a normal mood and affect. Her behavior is normal. Judgment and thought content normal.       Significant Labs:   CBC:   Recent Labs  Lab 05/11/18  1445   WBC 7.12   HGB 13.7   HCT 40.8        CMP:   Recent Labs  Lab 05/11/18  1445      K 4.0      CO2 24   *   BUN 12   CREATININE 0.9   CALCIUM 9.3   PROT 7.7   ALBUMIN 3.4*   BILITOT 0.7   ALKPHOS 121   AST 45*   ALT 42   ANIONGAP 11   EGFRNONAA >60       Significant Imaging: I have reviewed and interpreted all pertinent imaging results/findings within the past 24 hours.

## 2018-05-13 NOTE — PLAN OF CARE
Problem: Patient Care Overview  Goal: Plan of Care Review  Outcome: Ongoing (interventions implemented as appropriate)  Vitals remained stable, afebrile. Complained of pain when moving. Getting up with PT. Turning Q2. Legs elevated and heel booties on. Discussed plan of care, stated understanding

## 2018-05-14 PROBLEM — R39.11 URINARY HESITANCY: Status: ACTIVE | Noted: 2018-05-14

## 2018-05-14 LAB
ALBUMIN SERPL BCP-MCNC: 2.7 G/DL
ALP SERPL-CCNC: 83 U/L
ALT SERPL W/O P-5'-P-CCNC: 21 U/L
ANION GAP SERPL CALC-SCNC: 8 MMOL/L
AST SERPL-CCNC: 30 U/L
BASOPHILS # BLD AUTO: 0.02 K/UL
BASOPHILS NFR BLD: 0.3 %
BILIRUB SERPL-MCNC: 0.9 MG/DL
BILIRUB UR QL STRIP: NEGATIVE
BUN SERPL-MCNC: 8 MG/DL
CALCIUM SERPL-MCNC: 8.5 MG/DL
CHLORIDE SERPL-SCNC: 98 MMOL/L
CLARITY UR: CLEAR
CO2 SERPL-SCNC: 26 MMOL/L
COLOR UR: YELLOW
CREAT SERPL-MCNC: 0.7 MG/DL
DIFFERENTIAL METHOD: ABNORMAL
EOSINOPHIL # BLD AUTO: 0.5 K/UL
EOSINOPHIL NFR BLD: 7.3 %
ERYTHROCYTE [DISTWIDTH] IN BLOOD BY AUTOMATED COUNT: 13.8 %
EST. GFR  (AFRICAN AMERICAN): >60 ML/MIN/1.73 M^2
EST. GFR  (NON AFRICAN AMERICAN): >60 ML/MIN/1.73 M^2
GLUCOSE SERPL-MCNC: 123 MG/DL
GLUCOSE UR QL STRIP: NEGATIVE
HCT VFR BLD AUTO: 35.2 %
HGB BLD-MCNC: 11.6 G/DL
HGB UR QL STRIP: NEGATIVE
KETONES UR QL STRIP: NEGATIVE
LEUKOCYTE ESTERASE UR QL STRIP: NEGATIVE
LYMPHOCYTES # BLD AUTO: 1.6 K/UL
LYMPHOCYTES NFR BLD: 23.5 %
MCH RBC QN AUTO: 29.4 PG
MCHC RBC AUTO-ENTMCNC: 33 G/DL
MCV RBC AUTO: 89 FL
MONOCYTES # BLD AUTO: 0.5 K/UL
MONOCYTES NFR BLD: 7.7 %
NEUTROPHILS # BLD AUTO: 4.2 K/UL
NEUTROPHILS NFR BLD: 61.2 %
NITRITE UR QL STRIP: NEGATIVE
PH UR STRIP: 7 [PH] (ref 5–8)
PLATELET # BLD AUTO: 170 K/UL
PMV BLD AUTO: 10.3 FL
POTASSIUM SERPL-SCNC: 3.9 MMOL/L
PROT SERPL-MCNC: 6.6 G/DL
PROT UR QL STRIP: NEGATIVE
RBC # BLD AUTO: 3.94 M/UL
SODIUM SERPL-SCNC: 132 MMOL/L
SP GR UR STRIP: 1.01 (ref 1–1.03)
URN SPEC COLLECT METH UR: NORMAL
UROBILINOGEN UR STRIP-ACNC: 1 EU/DL
WBC # BLD AUTO: 6.86 K/UL

## 2018-05-14 PROCEDURE — 81003 URINALYSIS AUTO W/O SCOPE: CPT

## 2018-05-14 PROCEDURE — 94640 AIRWAY INHALATION TREATMENT: CPT

## 2018-05-14 PROCEDURE — 87086 URINE CULTURE/COLONY COUNT: CPT

## 2018-05-14 PROCEDURE — 94761 N-INVAS EAR/PLS OXIMETRY MLT: CPT

## 2018-05-14 PROCEDURE — 25000003 PHARM REV CODE 250: Performed by: SURGERY

## 2018-05-14 PROCEDURE — 63600175 PHARM REV CODE 636 W HCPCS: Performed by: FAMILY MEDICINE

## 2018-05-14 PROCEDURE — 25000003 PHARM REV CODE 250: Performed by: FAMILY MEDICINE

## 2018-05-14 PROCEDURE — 11000001 HC ACUTE MED/SURG PRIVATE ROOM

## 2018-05-14 PROCEDURE — 25000242 PHARM REV CODE 250 ALT 637 W/ HCPCS: Performed by: FAMILY MEDICINE

## 2018-05-14 PROCEDURE — 97165 OT EVAL LOW COMPLEX 30 MIN: CPT

## 2018-05-14 PROCEDURE — 99222 1ST HOSP IP/OBS MODERATE 55: CPT | Mod: ,,, | Performed by: NURSE PRACTITIONER

## 2018-05-14 PROCEDURE — 97110 THERAPEUTIC EXERCISES: CPT

## 2018-05-14 PROCEDURE — 36415 COLL VENOUS BLD VENIPUNCTURE: CPT

## 2018-05-14 PROCEDURE — 27000221 HC OXYGEN, UP TO 24 HOURS

## 2018-05-14 PROCEDURE — 97530 THERAPEUTIC ACTIVITIES: CPT

## 2018-05-14 PROCEDURE — 94799 UNLISTED PULMONARY SVC/PX: CPT

## 2018-05-14 PROCEDURE — 80053 COMPREHEN METABOLIC PANEL: CPT

## 2018-05-14 PROCEDURE — 85025 COMPLETE CBC W/AUTO DIFF WBC: CPT

## 2018-05-14 RX ADMIN — ENOXAPARIN SODIUM 40 MG: 100 INJECTION SUBCUTANEOUS at 05:05

## 2018-05-14 RX ADMIN — AMITRIPTYLINE HYDROCHLORIDE 25 MG: 25 TABLET, FILM COATED ORAL at 08:05

## 2018-05-14 RX ADMIN — DULOXETINE 30 MG: 30 CAPSULE, DELAYED RELEASE ORAL at 09:05

## 2018-05-14 RX ADMIN — PANTOPRAZOLE SODIUM 40 MG: 40 TABLET, DELAYED RELEASE ORAL at 09:05

## 2018-05-14 RX ADMIN — MORPHINE SULFATE 2 MG: 4 INJECTION INTRAVENOUS at 07:05

## 2018-05-14 RX ADMIN — HYDROCODONE BITARTRATE AND ACETAMINOPHEN 1 TABLET: 5; 325 TABLET ORAL at 09:05

## 2018-05-14 RX ADMIN — METOPROLOL SUCCINATE 50 MG: 50 TABLET, EXTENDED RELEASE ORAL at 09:05

## 2018-05-14 RX ADMIN — HYDROCODONE BITARTRATE AND ACETAMINOPHEN 1 TABLET: 5; 325 TABLET ORAL at 08:05

## 2018-05-14 RX ADMIN — IPRATROPIUM BROMIDE AND ALBUTEROL SULFATE 3 ML: 2.5; .5 SOLUTION RESPIRATORY (INHALATION) at 07:05

## 2018-05-14 RX ADMIN — ATORVASTATIN CALCIUM 20 MG: 20 TABLET, FILM COATED ORAL at 09:05

## 2018-05-14 RX ADMIN — IPRATROPIUM BROMIDE AND ALBUTEROL SULFATE 3 ML: 2.5; .5 SOLUTION RESPIRATORY (INHALATION) at 06:05

## 2018-05-14 RX ADMIN — DOCUSATE SODIUM 100 MG: 100 CAPSULE, LIQUID FILLED ORAL at 09:05

## 2018-05-14 RX ADMIN — HYDROCODONE BITARTRATE AND ACETAMINOPHEN 1 TABLET: 5; 325 TABLET ORAL at 05:05

## 2018-05-14 RX ADMIN — AMLODIPINE BESYLATE 5 MG: 5 TABLET ORAL at 09:05

## 2018-05-14 RX ADMIN — DOCUSATE SODIUM 100 MG: 100 CAPSULE, LIQUID FILLED ORAL at 08:05

## 2018-05-14 NOTE — ASSESSMENT & PLAN NOTE
Ordering PT and nebs q 12 to reduce risk of atelectasis  Incentive spirometer >> encouraged use  Add OT today as she was living home with  prior to fall and able to take care of self and him

## 2018-05-14 NOTE — PT/OT/SLP PROGRESS
"Physical Therapy Treatment    Patient Name:  Merari Romero   MRN:  [id number]    Recommendations:     Discharge Recommendations:  rehabilitation facility, nursing facility, skilled   Discharge Equipment Recommendations:  (TBD)   Barriers to discharge: None    Assessment:     Merari Romero is a 78 y.o. female admitted with a medical diagnosis of Closed nondisplaced fracture of neck of left radius.  She presents with the following impairments/functional limitations:  weakness, impaired endurance, impaired self care skills, impaired functional mobilty, impaired balance, decreased upper extremity function, decreased lower extremity function, decreased safety awareness, pain . Pt with increased pain this PM during treatment session. Pt refused to sit on EOB secondary to just returning to bed following OT.    Rehab Prognosis:  Good; patient would benefit from acute skilled PT services to address these deficits and reach maximum level of function.      Recent Surgery: * No surgery found *      Plan:     During this hospitalization, patient to be seen  (1-2x/day Monday-Friday; PRN Weekends/holidays) to address the above listed problems via gait training, therapeutic activities, therapeutic exercises  · Plan of Care Expires:   (upon D/C from facility)   Plan of Care Reviewed with: patient, spouse    Subjective     Communicated with patient and spouse prior to session.  Patient found supine in bed upon PT entry to room, agreeable to treatment.      Chief Complaint: "I am hurting"  Patient comments/goals: "Go home"  Pain/Comfort:  · Pain Rating 1: 6/10  · Location - Side 1: Left  · Location - Orientation 1: generalized  · Location 1: hip (LUE)  · Pain Addressed 1: Pre-medicate for activity    Patients cultural, spiritual, Orthodoxy conflicts given the current situation:      Objective:     Patient found with: bed alarm     General Precautions: Standard, fall   Orthopedic Precautions:LUE non weight bearing, LLE non weight bearing "   Braces: N/A     AM-PAC 6 CLICK MOBILITY  Turning over in bed (including adjusting bedclothes, sheets and blankets)?: 2  Sitting down on and standing up from a chair with arms (e.g., wheelchair, bedside commode, etc.): 1  Moving from lying on back to sitting on the side of the bed?: 2  Moving to and from a bed to a chair (including a wheelchair)?: 1  Need to walk in hospital room?: 1  Climbing 3-5 steps with a railing?: 1  Total Score: 8       Therapeutic Activities and Exercises:   Therapeutic exercises with assistance as needed supine in bed at all joints in available planes of motion per pt pain tolerance with rest breaks as needed to increase strength and ROM to improve independence with all gross mobility skills.    Patient left supine with all lines intact, call button in reach, NSG notified and family present..    GOALS:    Physical Therapy Goals        Problem: Physical Therapy Goal    Goal Priority Disciplines Outcome Goal Variances Interventions   Physical Therapy Goal     PT/OT, PT      Description:  Pt will advance recovery following  current left elbow and pelvic fractures by:  1. Assist with bed mobility leading to contact guard assist   2. Increase self help and therapeutic exercises for all extremities except left elbow  3  Progress to stand and transfer tasks with min assist   4. Iintiate walking with platform walker for left UE for short distances 25 ft/ with min assistance            Problem: Physical Therapy Goal    Goal Priority Disciplines Outcome Goal Variances Interventions   Physical Therapy Goal     PT/OT, PT      Description:  Advancing activities to accomplish anticiapted goals                    Time Tracking:     PT Received On: 05/14/18  PT Start Time: 1353     PT Stop Time: 1408  PT Total Time (min): 15 min     Billable Minutes: Therapeutic Exercise 15 minutes    Treatment Type: Treatment  PT/PTA: PT           Paige Ramirez, PT  05/14/2018

## 2018-05-14 NOTE — PROGRESS NOTES
[facility] Hospital Medicine  Progress Note    Patient Name: Merari Romero  MRN: [id number]  Patient Class: IP- Inpatient   Admission Date: 5/11/2018  Length of Stay: 0 days  Attending Physician: Michael Chacon MD  Primary Care Provider: Shaun Barraza MD        Subjective:     Principal Problem:Closed nondisplaced fracture of neck of left radius    HPI:  78 year old female fell going up stairs today and slipped on first step. Fell on left side. Work up in ED is pos for radial neck fracture and pelvic fractures. I am told that ortho was contacted. Pt is cleared for admit here. She will be admitted for pain control and PT.       Hospital Course:  Pain controlled with dilaudid   Sat up with PT this am  She is able to use bedpan but very painful     5/13  Pain controlled  Stood up with PT today     5/14  She did stand x 2 min with PT x 1, second attempt not successful due to pt fatigue. Her goal with PT : Iintiate walking with platform walker for left UE for short distances 25 ft/ with min assistance . She is using less morphine IV, on norco, received 2 doses with relief. C/o dribbling urine this am, not emptying bladder fully    Interval History: see HC     Review of Systems   Constitutional: Negative for chills and fever.   Respiratory: Negative for shortness of breath.    Cardiovascular: Negative for chest pain and palpitations.   Gastrointestinal: Negative for abdominal pain, blood in stool, constipation and nausea.   Genitourinary: Negative for difficulty urinating and pelvic pain.   Musculoskeletal: Positive for arthralgias and myalgias.        Left hip pain with moving as well as left arm pain with moving   Skin:        Left middle toe bruised   Psychiatric/Behavioral: Negative for dysphoric mood, sleep disturbance and suicidal ideas. The patient is not nervous/anxious.      Objective:     Vital Signs (Most Recent):  Temp: 96.5 °F (35.8 °C) (05/14/18 0335)  Pulse: 70 (05/14/18 0713)  Resp:  17 (05/14/18 0713)  BP: 133/70 (05/14/18 0335)  SpO2: 95 % (05/14/18 0713) Vital Signs (24h Range):  Temp:  [96.5 °F (35.8 °C)-100.8 °F (38.2 °C)] 96.5 °F (35.8 °C)  Pulse:  [] 70  Resp:  [16-18] 17  SpO2:  [90 %-96 %] 95 %  BP: (107-138)/(64-83) 133/70     Weight: 64 kg (141 lb)  Body mass index is 24.98 kg/m².    Intake/Output Summary (Last 24 hours) at 05/14/18 0821  Last data filed at 05/13/18 2100   Gross per 24 hour   Intake                0 ml   Output              300 ml   Net             -300 ml      Physical Exam   Constitutional: She is oriented to person, place, and time. She appears well-developed and well-nourished.   HENT:   Head: Normocephalic and atraumatic.   Eyes: EOM are normal. Pupils are equal, round, and reactive to light.   Neck: Normal range of motion. Neck supple.   Cardiovascular: Normal rate, regular rhythm, normal heart sounds and intact distal pulses.    No murmur heard.  Pulmonary/Chest: Effort normal and breath sounds normal. No respiratory distress. She has no wheezes. She has no rales. She exhibits no tenderness.   Abdominal: Soft. Bowel sounds are normal. She exhibits no distension and no mass. There is no tenderness. There is no rebound and no guarding.   Musculoskeletal: Normal range of motion. She exhibits no edema or tenderness.   LUE posterior splint with ACE easily palp pulse    Left pelvic TTP foot palp pulse   Neurological: She is alert and oriented to person, place, and time. She has normal reflexes.   Skin: Skin is warm and dry. Capillary refill takes less than 2 seconds. No rash noted. No erythema. No pallor.   Psychiatric: She has a normal mood and affect. Her behavior is normal. Judgment and thought content normal.       Significant Labs:   Lab Results   Component Value Date    WBC 7.12 05/11/2018    HGB 13.7 05/11/2018    HCT 40.8 05/11/2018    MCV 87 05/11/2018     05/11/2018     BMP  Lab Results   Component Value Date     05/11/2018    K 4.0  05/11/2018     05/11/2018    CO2 24 05/11/2018    BUN 12 05/11/2018    CREATININE 0.9 05/11/2018    CALCIUM 9.3 05/11/2018    ANIONGAP 11 05/11/2018    ESTGFRAFRICA >60 05/11/2018    EGFRNONAA >60 05/11/2018     Lab Results   Component Value Date    ALT 42 05/11/2018    AST 45 (H) 05/11/2018    ALKPHOS 121 05/11/2018    BILITOT 0.7 05/11/2018     Lab Results   Component Value Date    INR 1.1 05/11/2018         Significant Imaging:     Left hip x ray No definite evidence of a displaced fracture or dislocation.    The symphysis pubis is limited to evaluation due to angulation and positioning and a pubis fracture cannot be excluded..    CXR 2.3 cm confluence of shadows overlying the left upper lobe.  Consider nonurgent follow-up chest with lateral for further evaluation.    Left elbow x ray There is a fracture involving the radial neck.  Posterior fat pad is present compatible with an effusion.    Left hip x rays repeat  There are fractures involving the left symphysis pubis and the inferior ramus which were limited to evaluation on the prior study.    No evidence of a fracture of the left hip.  No dislocation.  Vascular calcifications overlying the left femoral head and neck.    Assessment/Plan:      * Closed nondisplaced fracture of neck of left radius    Ortho consulted via ER, non surgical orthopedic injuries per Dr Doc Wolf, non wt bearing injuries  Splinted           Urinary hesitancy    Check U/ a and culture  Add on CMP today          Bed confinement status    Ordering PT and nebs q 12 to reduce risk of atelectasis  Incentive spirometer >> encouraged use  Add OT today as she was living home with  prior to fall and able to take care of self and him        History of deep venous thrombosis    lovenox 40 daily due to hx of DVT and immobility          Closed fracture of left pubis    Non operative   Pain control try and cont po as much as poss  Try and reduce risk of associated complications of  limited mobility(IS, nebs, PT)  Discussed with [facility]. They will see on Wed, cont non wt bearing to left upper ext          Neuropathy of lower extremity    Continue low dose amitriptyline and cymbalta          HTN (hypertension)    Continue metoprol and norvasc  Bp 116//70            VTE Risk Mitigation         Ordered     enoxaparin injection 40 mg  Daily      05/11/18 1848     IP VTE HIGH RISK PATIENT  Once      05/11/18 1807     Place sequential compression device  Until discontinued      05/11/18 1807              Janae Daly NP  Department of Hospital Medicine   [facility]

## 2018-05-14 NOTE — ASSESSMENT & PLAN NOTE
Ortho consulted via ER, non surgical orthopedic injuries per Dr Doc Wolf, non wt bearing injuries  Splinted

## 2018-05-14 NOTE — SUBJECTIVE & OBJECTIVE
Interval History: see      Review of Systems   Constitutional: Negative for chills and fever.   Respiratory: Negative for shortness of breath.    Cardiovascular: Negative for chest pain and palpitations.   Gastrointestinal: Negative for abdominal pain, blood in stool, constipation and nausea.   Genitourinary: Negative for difficulty urinating and pelvic pain.   Musculoskeletal: Positive for arthralgias and myalgias.        Left hip pain with moving as well as left arm pain with moving   Skin:        Left middle toe bruised   Psychiatric/Behavioral: Negative for dysphoric mood, sleep disturbance and suicidal ideas. The patient is not nervous/anxious.      Objective:     Vital Signs (Most Recent):  Temp: 96.5 °F (35.8 °C) (05/14/18 0335)  Pulse: 70 (05/14/18 0713)  Resp: 17 (05/14/18 0713)  BP: 133/70 (05/14/18 0335)  SpO2: 95 % (05/14/18 0713) Vital Signs (24h Range):  Temp:  [96.5 °F (35.8 °C)-100.8 °F (38.2 °C)] 96.5 °F (35.8 °C)  Pulse:  [] 70  Resp:  [16-18] 17  SpO2:  [90 %-96 %] 95 %  BP: (107-138)/(64-83) 133/70     Weight: 64 kg (141 lb)  Body mass index is 24.98 kg/m².    Intake/Output Summary (Last 24 hours) at 05/14/18 0821  Last data filed at 05/13/18 2100   Gross per 24 hour   Intake                0 ml   Output              300 ml   Net             -300 ml      Physical Exam   Constitutional: She is oriented to person, place, and time. She appears well-developed and well-nourished.   HENT:   Head: Normocephalic and atraumatic.   Eyes: EOM are normal. Pupils are equal, round, and reactive to light.   Neck: Normal range of motion. Neck supple.   Cardiovascular: Normal rate, regular rhythm, normal heart sounds and intact distal pulses.    No murmur heard.  Pulmonary/Chest: Effort normal and breath sounds normal. No respiratory distress. She has no wheezes. She has no rales. She exhibits no tenderness.   Abdominal: Soft. Bowel sounds are normal. She exhibits no distension and no mass. There is no  tenderness. There is no rebound and no guarding.   Musculoskeletal: Normal range of motion. She exhibits no edema or tenderness.   LUE posterior splint with ACE easily palp pulse    Left pelvic TTP foot palp pulse   Neurological: She is alert and oriented to person, place, and time. She has normal reflexes.   Skin: Skin is warm and dry. Capillary refill takes less than 2 seconds. No rash noted. No erythema. No pallor.   Psychiatric: She has a normal mood and affect. Her behavior is normal. Judgment and thought content normal.       Significant Labs:   Lab Results   Component Value Date    WBC 7.12 05/11/2018    HGB 13.7 05/11/2018    HCT 40.8 05/11/2018    MCV 87 05/11/2018     05/11/2018     BMP  Lab Results   Component Value Date     05/11/2018    K 4.0 05/11/2018     05/11/2018    CO2 24 05/11/2018    BUN 12 05/11/2018    CREATININE 0.9 05/11/2018    CALCIUM 9.3 05/11/2018    ANIONGAP 11 05/11/2018    ESTGFRAFRICA >60 05/11/2018    EGFRNONAA >60 05/11/2018     Lab Results   Component Value Date    ALT 42 05/11/2018    AST 45 (H) 05/11/2018    ALKPHOS 121 05/11/2018    BILITOT 0.7 05/11/2018     Lab Results   Component Value Date    INR 1.1 05/11/2018         Significant Imaging:     Left hip x ray No definite evidence of a displaced fracture or dislocation.    The symphysis pubis is limited to evaluation due to angulation and positioning and a pubis fracture cannot be excluded..    CXR 2.3 cm confluence of shadows overlying the left upper lobe.  Consider nonurgent follow-up chest with lateral for further evaluation.    Left elbow x ray There is a fracture involving the radial neck.  Posterior fat pad is present compatible with an effusion.    Left hip x rays repeat  There are fractures involving the left symphysis pubis and the inferior ramus which were limited to evaluation on the prior study.    No evidence of a fracture of the left hip.  No dislocation.  Vascular calcifications overlying the  left femoral head and neck.

## 2018-05-14 NOTE — PLAN OF CARE
Problem: Fall Risk (Adult)  Goal: Absence of Falls  Patient will demonstrate the desired outcomes by discharge/transition of care.   Outcome: Ongoing (interventions implemented as appropriate)  Pt free of falls/incidents. Fall precautions maintained.    Problem: Patient Care Overview  Goal: Plan of Care Review  Outcome: Ongoing (interventions implemented as appropriate)  Pt & spouse aware of plan of care. No questions or concerns at this time.

## 2018-05-14 NOTE — ASSESSMENT & PLAN NOTE
Non operative   Pain control try and cont po as much as poss  Try and reduce risk of associated complications of limited mobility(IS, nebs, PT)  Discussed with [facility]. They will see on Wed, cont non wt bearing to left upper ext

## 2018-05-14 NOTE — CARE UPDATE
Spoke with Bo with [facility] ortho. She reviewed x rays and recs non wt bearing to left upper ext and WBAT to left lower ext. They will be here Wednesday to eval progress

## 2018-05-14 NOTE — PT/OT/SLP PROGRESS
"Physical Therapy Treatment    Patient Name:  Merari Romero   MRN:  [id number]    Recommendations:     Discharge Recommendations:  rehabilitation facility, nursing facility, skilled   Discharge Equipment Recommendations:  (TBD)   Barriers to discharge: None    Assessment:     Merari Romero is a 78 y.o. female admitted with a medical diagnosis of Closed nondisplaced fracture of neck of left radius.  She presents with the following impairments/functional limitations:  weakness, impaired endurance, impaired self care skills, impaired functional mobilty, gait instability, impaired balance, decreased upper extremity function, decreased lower extremity function, decreased safety awareness. Pt with pain limiting mobility somewhat; however, pt motivated with PT. NWB limiting pt progression as well.    Rehab Prognosis:  Fair; patient would benefit from acute skilled PT services to address these deficits and reach maximum level of function.      Recent Surgery: * No surgery found *      Plan:     During this hospitalization, patient to be seen  (1-2x/day Monday-Friday; PRN Weekends/Holidays) to address the above listed problems via gait training, therapeutic activities, therapeutic exercises  · Plan of Care Expires:   (upon D/C from facility)   Plan of Care Reviewed with: patient, spouse    Subjective     Communicated with patient and her spouse prior to session.  Patient found supine in bed upon PT entry to room, agreeable to treatment.      Chief Complaint: "My leg and my arm hurts"  Patient comments/goals: "Go home"  Pain/Comfort:  · Pain Rating 1: 6/10  · Location - Side 1: Left  · Location 1: hip  · Pain Addressed 1: Pre-medicate for activity    Patients cultural, spiritual, Yarsanism conflicts given the current situation:      Objective:     Patient found with: bed alarm (heel boots, )     General Precautions: Standard, fall   Orthopedic Precautions:LUE non weight bearing, LLE non weight bearing   Braces: N/A     Functional " Mobility:  · Bed Mobility:     · Rolling Left:  moderate assistance  · Rolling Right: moderate assistance  · Supine to Sit: moderate assistance  · Sit to Supine: moderate assistance      AM-PAC 6 CLICK MOBILITY  Turning over in bed (including adjusting bedclothes, sheets and blankets)?: 2  Sitting down on and standing up from a chair with arms (e.g., wheelchair, bedside commode, etc.): 1  Moving from lying on back to sitting on the side of the bed?: 2  Moving to and from a bed to a chair (including a wheelchair)?: 1  Need to walk in hospital room?: 1  Climbing 3-5 steps with a railing?: 1  Total Score: 8       Therapeutic Activities and Exercises:   Therapeutic exercises performed on BLE seated EOB at all joints in available planes of motion per pt pain tolerance to increase strength and endurance with all gross mobility skills.    Patient left supine with all lines intact, call button in reach, NSg notified and spouse present..    GOALS:    Physical Therapy Goals        Problem: Physical Therapy Goal    Goal Priority Disciplines Outcome Goal Variances Interventions   Physical Therapy Goal     PT/OT, PT      Description:  Pt will advance recovery following  current left elbow and pelvic fractures by:  1. Assist with bed mobility leading to contact guard assist   2. Increase self help and therapeutic exercises for all extremities except left elbow  3  Progress to stand and transfer tasks with min assist   4. Iintiate walking with platform walker for left UE for short distances 25 ft/ with min assistance            Problem: Physical Therapy Goal    Goal Priority Disciplines Outcome Goal Variances Interventions   Physical Therapy Goal     PT/OT, PT      Description:  Advancing activities to accomplish anticiapted goals                    Time Tracking:     PT Received On: 05/14/18  PT Start Time: 0925     PT Stop Time: 0950  PT Total Time (min): 25 min     Billable Minutes: Therapeutic Activity 10 minutes and  Therapeutic Exercise 15 minutes    Treatment Type: Treatment  PT/PTA: PT           Paige Ramirez, PT  05/14/2018

## 2018-05-14 NOTE — PLAN OF CARE
05/11/18 1725   Medicare Message   Important Message from Medicare regarding Discharge Appeal Rights Given to patient/caregiver;Explained to patient/caregiver;Signed/date by patient/caregiver   Date IMM was signed 05/11/18   Time IMM was signed 1725

## 2018-05-14 NOTE — PROGRESS NOTES
I spoke with the patient and her , with consent.  We discussed her home meds, current meds, what they are for and common side effects.  We thoroughly discussed her anticoagulation med, fall precautions and pain control.  The patient is experiencing pain at level 8 and requested some pain medication so I notified the nursing staff.  She also mentioned she has been having hallucinations at night for a while now prior to admission.

## 2018-05-14 NOTE — PLAN OF CARE
Problem: Patient Care Overview  Goal: Plan of Care Review  Outcome: Ongoing (interventions implemented as appropriate)  Patient aware of plan of care. No questions or concerns at this time. Patient c/o pain to LUE and left hip. Pain 8/10. Patient received Norco 5 mg. Patient tolerated well and pain reduced to 6/10. Heels floated on pillow with heel booties. Turn Q2H. Patient continues to work on incentive spirometer. Patient achieves 1000 ml with deep breaths and continues to aim higher. Patient agrees with POC.

## 2018-05-14 NOTE — PLAN OF CARE
05/14/18 0950   SANTACRUZ Message   Medicare Outpatient and Observation Notification regarding financial responsibility Given to patient/caregiver;Explained to patient/caregiver;Signed/date by patient/caregiver   Date SANTACRUZ was signed 05/14/18   Time SANTACRUZ was signed 0950

## 2018-05-14 NOTE — PLAN OF CARE
05/14/18 1328   Discharge Assessment   Assessment Type Discharge Planning Reassessment     Discussed plan of care with patient and her . She will remain on acute care today for adequate pain control so she can progress with therapy. Post acute options include skilled placement/IP rehab. Patient was independent prior to injury and plans to return home. Will reassess tomorrow.

## 2018-05-14 NOTE — PLAN OF CARE
05/14/18 1146   Discharge Assessment   Assessment Type Discharge Planning Assessment   Confirmed/corrected address and phone number on facesheet? Yes   Assessment information obtained from? Medical Record;Patient   Expected Length of Stay (days) 2   Communicated expected length of stay with patient/caregiver yes   Prior to hospitilization cognitive status: Alert/Oriented   Prior to hospitalization functional status: Independent   Current cognitive status: Alert/Oriented   Current Functional Status: Independent   Lives With spouse   Able to Return to Prior Arrangements yes   Is patient able to care for self after discharge? Yes   Who are your caregiver(s) and their phone number(s)? Chandana Romero [phone]   Patient's perception of discharge disposition home or selfcare   Readmission Within The Last 30 Days no previous admission in last 30 days   Patient currently being followed by outpatient case management? No   Patient currently receives any other outside agency services? No   Equipment Currently Used at Home (The pt has 2 canes and a rollator at home. )   Do you have any problems affording any of your prescribed medications? No   Is the patient taking medications as prescribed? yes   Does the patient have transportation home? Yes   Transportation Available family or friend will provide   Does the patient receive services at the [facility]? No   Discharge Plan A Home with family   Discharge Plan B Home with family;Home Health   Patient/Family In Agreement With Plan yes     The pt is a 78 year old female who was admitted with a fall. The pt lives with her . The pt does not use an assistive device. The pt has a 2 canes and a rollator at home. The pt does not use O2. The pt does not have hh. The pt is able to afford her medications. The pt has n No other SW needs noted at this time. SW will continue to follow and offer support as needed.    Manolo Monroe LMSW

## 2018-05-15 PROCEDURE — 25000003 PHARM REV CODE 250: Performed by: SURGERY

## 2018-05-15 PROCEDURE — 11000001 HC ACUTE MED/SURG PRIVATE ROOM

## 2018-05-15 PROCEDURE — 94761 N-INVAS EAR/PLS OXIMETRY MLT: CPT

## 2018-05-15 PROCEDURE — 97530 THERAPEUTIC ACTIVITIES: CPT

## 2018-05-15 PROCEDURE — 97110 THERAPEUTIC EXERCISES: CPT

## 2018-05-15 PROCEDURE — 25000003 PHARM REV CODE 250: Performed by: FAMILY MEDICINE

## 2018-05-15 PROCEDURE — 94640 AIRWAY INHALATION TREATMENT: CPT

## 2018-05-15 PROCEDURE — 97116 GAIT TRAINING THERAPY: CPT

## 2018-05-15 PROCEDURE — 99232 SBSQ HOSP IP/OBS MODERATE 35: CPT | Mod: ,,, | Performed by: INTERNAL MEDICINE

## 2018-05-15 PROCEDURE — 25000242 PHARM REV CODE 250 ALT 637 W/ HCPCS: Performed by: FAMILY MEDICINE

## 2018-05-15 PROCEDURE — 63600175 PHARM REV CODE 636 W HCPCS: Performed by: FAMILY MEDICINE

## 2018-05-15 RX ADMIN — ATORVASTATIN CALCIUM 20 MG: 20 TABLET, FILM COATED ORAL at 09:05

## 2018-05-15 RX ADMIN — MORPHINE SULFATE 2 MG: 4 INJECTION INTRAVENOUS at 08:05

## 2018-05-15 RX ADMIN — PANTOPRAZOLE SODIUM 40 MG: 40 TABLET, DELAYED RELEASE ORAL at 09:05

## 2018-05-15 RX ADMIN — HYDROCODONE BITARTRATE AND ACETAMINOPHEN 1 TABLET: 5; 325 TABLET ORAL at 06:05

## 2018-05-15 RX ADMIN — DOCUSATE SODIUM 100 MG: 100 CAPSULE, LIQUID FILLED ORAL at 09:05

## 2018-05-15 RX ADMIN — AMITRIPTYLINE HYDROCHLORIDE 25 MG: 25 TABLET, FILM COATED ORAL at 08:05

## 2018-05-15 RX ADMIN — DULOXETINE 30 MG: 30 CAPSULE, DELAYED RELEASE ORAL at 09:05

## 2018-05-15 RX ADMIN — AMLODIPINE BESYLATE 5 MG: 5 TABLET ORAL at 09:05

## 2018-05-15 RX ADMIN — MORPHINE SULFATE 2 MG: 4 INJECTION INTRAVENOUS at 12:05

## 2018-05-15 RX ADMIN — ENOXAPARIN SODIUM 40 MG: 100 INJECTION SUBCUTANEOUS at 06:05

## 2018-05-15 RX ADMIN — DOCUSATE SODIUM 100 MG: 100 CAPSULE, LIQUID FILLED ORAL at 08:05

## 2018-05-15 RX ADMIN — HYDROCODONE BITARTRATE AND ACETAMINOPHEN 1 TABLET: 5; 325 TABLET ORAL at 09:05

## 2018-05-15 RX ADMIN — IPRATROPIUM BROMIDE AND ALBUTEROL SULFATE 3 ML: 2.5; .5 SOLUTION RESPIRATORY (INHALATION) at 07:05

## 2018-05-15 RX ADMIN — IPRATROPIUM BROMIDE AND ALBUTEROL SULFATE 3 ML: 2.5; .5 SOLUTION RESPIRATORY (INHALATION) at 06:05

## 2018-05-15 RX ADMIN — METOPROLOL SUCCINATE 50 MG: 50 TABLET, EXTENDED RELEASE ORAL at 09:05

## 2018-05-15 NOTE — PROGRESS NOTES
[facility] Medicine  Progress Note    Patient Name: Merari Romero  MRN: [id number]  Patient Class: IP- Inpatient   Admission Date: 5/11/2018  Length of Stay: 1 days  Attending Physician: Michael Chacon MD  Primary Care Provider: Shaun Barraza MD        Subjective:     Principal Problem:Closed nondisplaced fracture of neck of left radius    HPI:  78 year old female fell going up stairs today and slipped on first step. Fell on left side. Work up in ED is pos for radial neck fracture and pelvic fractures. I am told that ortho was contacted. Pt is cleared for admit here. She will be admitted for pain control and PT.       Hospital Course:  Pain controlled with dilaudid   Sat up with PT this am  She is able to use bedpan but very painful     5/13  Pain controlled  Stood up with PT today     5/14  She did stand x 2 min with PT x 1, second attempt not successful due to pt fatigue. Her goal with PT : Iintiate walking with platform walker for left UE for short distances 25 ft/ with min assistance . She is using less morphine IV, on norco, received 2 doses with relief. C/o dribbling urine this am, not emptying bladder fully    5/15  She did not get up yesterday as she was having a lot of pain. Spoke with ortho and they rec non wt bearing to left upper ext and weight bearing as tolerated to left lower ext. Her goals are short distance of 25ft with platform walker using min assistance. Her pox is 90-99% on RA. Encouraged to use IS at bedside and continued on nebs every 12hr. She did have issues with urinary hesitancy so u/a collected which looks good and urine culture pending,. No fever. Will try and get hher up to bed side commode to see if that helps.     Still requiring intermittent IV pain meds for break though pain using norco 10mg po.     Interval History: see HC     Review of Systems   Constitutional: Negative for chills and fever.   Respiratory: Negative for shortness of breath.     Cardiovascular: Negative for chest pain and palpitations.   Gastrointestinal: Negative for abdominal pain, blood in stool, constipation and nausea.   Genitourinary: Negative for difficulty urinating and pelvic pain.   Musculoskeletal: Positive for arthralgias and myalgias.        Left hip pain with moving as well as left arm pain with moving   Skin:        Left middle toe bruised   Psychiatric/Behavioral: Negative for dysphoric mood, sleep disturbance and suicidal ideas. The patient is not nervous/anxious.      Objective:     Vital Signs (Most Recent):  Temp: 97.3 °F (36.3 °C) (05/15/18 0322)  Pulse: 71 (05/15/18 0714)  Resp: 17 (05/15/18 0714)  BP: 132/74 (05/15/18 0322)  SpO2: (!) 93 % (05/15/18 0714) Vital Signs (24h Range):  Temp:  [96.1 °F (35.6 °C)-98.7 °F (37.1 °C)] 97.3 °F (36.3 °C)  Pulse:  [] 71  Resp:  [17-18] 17  SpO2:  [90 %-99 %] 93 %  BP: (109-136)/(62-74) 132/74     Weight: 64 kg (141 lb)  Body mass index is 24.98 kg/m².    Intake/Output Summary (Last 24 hours) at 05/15/18 0810  Last data filed at 05/14/18 2200   Gross per 24 hour   Intake                0 ml   Output              400 ml   Net             -400 ml      Physical Exam   Constitutional: She is oriented to person, place, and time. She appears well-developed and well-nourished.   HENT:   Head: Normocephalic and atraumatic.   Eyes: EOM are normal. Pupils are equal, round, and reactive to light.   Neck: Normal range of motion. Neck supple.   Cardiovascular: Normal rate, regular rhythm, normal heart sounds and intact distal pulses.    No murmur heard.  Pulmonary/Chest: Effort normal and breath sounds normal. No respiratory distress. She has no wheezes. She has no rales. She exhibits no tenderness.   Abdominal: Soft. Bowel sounds are normal. She exhibits no distension and no mass. There is no tenderness. There is no rebound and no guarding.   Musculoskeletal: Normal range of motion. She exhibits no edema or tenderness.   LUE posterior  splint with ACE easily palp pulse    Left pelvic TTP foot palp pulse   Neurological: She is alert and oriented to person, place, and time. She has normal reflexes.   Skin: Skin is warm and dry. Capillary refill takes less than 2 seconds. No rash noted. No erythema. No pallor.   Psychiatric: She has a normal mood and affect. Her behavior is normal. Judgment and thought content normal.       Significant Labs:   Lab Results   Component Value Date    WBC 6.86 05/14/2018    HGB 11.6 (L) 05/14/2018    HCT 35.2 (L) 05/14/2018    MCV 89 05/14/2018     05/14/2018     BMP  Lab Results   Component Value Date     (L) 05/14/2018    K 3.9 05/14/2018    CL 98 05/14/2018    CO2 26 05/14/2018    BUN 8 05/14/2018    CREATININE 0.7 05/14/2018    CALCIUM 8.5 (L) 05/14/2018    ANIONGAP 8 05/14/2018    ESTGFRAFRICA >60 05/14/2018    EGFRNONAA >60 05/14/2018     Lab Results   Component Value Date    ALT 21 05/14/2018    AST 30 05/14/2018    ALKPHOS 83 05/14/2018    BILITOT 0.9 05/14/2018     Lab Results   Component Value Date    INR 1.1 05/11/2018     Urinalysis    Recent Labs  Lab 05/14/18  1142   COLORU Yellow   SPECGRAV 1.010   PHUR 7.0   PROTEINUA Negative   NITRITE Negative   LEUKOCYTESUR Negative   UROBILINOGEN 1.0         Significant Imaging:     Left hip x ray No definite evidence of a displaced fracture or dislocation.    The symphysis pubis is limited to evaluation due to angulation and positioning and a pubis fracture cannot be excluded..    CXR 2.3 cm confluence of shadows overlying the left upper lobe.  Consider nonurgent follow-up chest with lateral for further evaluation.    Left elbow x ray There is a fracture involving the radial neck.  Posterior fat pad is present compatible with an effusion.    Left hip x rays repeat  There are fractures involving the left symphysis pubis and the inferior ramus which were limited to evaluation on the prior study.    No evidence of a fracture of the left hip.  No  dislocation.  Vascular calcifications overlying the left femoral head and neck.    Assessment/Plan:      * Closed nondisplaced fracture of neck of left radius    Ortho consulted via ER, non surgical orthopedic injuries per Dr Doc Wolf, non wt bearing injury to left upper ext Splinted   Se also can wt bear as tolerated to left lower ext        Urinary hesitancy    Check U/ a and culture- all normal            Bed confinement status    Ordering PT and nebs q 12 to reduce risk of atelectasis  Incentive spirometer >> encouraged use  Add OT today as she was living home with  prior to fall and able to take care of self and him  Consult inpt rehab to see if she would qualify        History of deep venous thrombosis    lovenox 40 daily due to hx of DVT and immobility          Closed fracture of left pubis    Non operative   Pain control try and cont po as much as poss  Try and reduce risk of associated complications of limited mobility(IS, nebs, PT)  Discussed with [facility]. They will see on Wed, cont non wt bearing to left upper ext and wt bearing as tolerated to left lower ext          Neuropathy of lower extremity    Continue low dose amitriptyline and cymbalta          HTN (hypertension)    Continue metoprol and norvasc  Bp 116//70            VTE Risk Mitigation         Ordered     enoxaparin injection 40 mg  Daily      05/11/18 1848     IP VTE HIGH RISK PATIENT  Once      05/11/18 1807     Place sequential compression device  Until discontinued      05/11/18 1807              Amol Campbell MD  Department of Hospital Medicine   [facility]

## 2018-05-15 NOTE — ASSESSMENT & PLAN NOTE
Non operative   Pain control try and cont po as much as poss  Try and reduce risk of associated complications of limited mobility(IS, nebs, PT)  Discussed with [facility]. They will see on Wed, cont non wt bearing to left upper ext and wt bearing as tolerated to left lower ext

## 2018-05-15 NOTE — PT/OT/SLP PROGRESS
Occupational Therapy   Treatment    Name: Merari Romero  MRN: [id number]  Admitting Diagnosis:  Closed nondisplaced fracture of neck of left radius       Recommendations:     Discharge Recommendations: rehabilitation facility  Discharge Equipment Recommendations:  bedside commode, shower chair  Barriers to discharge:  None    Subjective     Communicated with: nsg prior to session.  Pain/Comfort:  · Pain Rating 1: 5/10  · Location - Side 1: Left  · Location 1: hip  · Pain Addressed 1: Pre-medicate for activity  · Pain Rating Post-Intervention 1: 5/10    Patients cultural, spiritual, Yarsanism conflicts given the current situation: none voiced    Objective:     Patient found with:  ((L) UE sling)    General Precautions: Standard, fall   Orthopedic Precautions:LUE non weight bearing, LLE weight bearing as tolerated   Braces: UE Sling     Occupational Performance:    Bed Mobility:    · Patient completed Scooting/Bridging with maximal assistance  · Patient completed Supine to Sit with maximal assistance  · Patient completed Sit to Supine with maximal assistance     Functional Mobility/Transfers:  · Patient completed Bed <> Chair Transfer using Squat Pivot technique with minimum assistance and moderate assistance with no assistive device  · Chair to bed Max (A), squat pivot      Patient left supine and HOB elevated  with all lines intact, call button in reach and nurse and PCT present present    [facility] 6 Click:  [facility] Total Score:        Education:    Assessment:     Merari Romero is a 78 y.o. female with a medical diagnosis of Closed nondisplaced fracture of neck of left radius.  Pt did well t/f bed>chair, but due to fear of falling Pt with increase (A) required t/f chair>bed.  Going from chair to bed, chair placed that (R) UE closest to bed to help with t/f. Pt contributes fear of falling to increase (A) needed.  Performance deficits affecting function are impaired endurance, impaired self care skills, impaired functional  mobilty.      Rehab Prognosis:  Good; patient would benefit from acute skilled OT services to address these deficits and reach maximum level of function.       Plan:     Patient to be seen 3 x/week, 5 x/week to address the above listed problems via self-care/home management, therapeutic activities, therapeutic exercises  · Plan of Care Expires:  (d/c from facility)  · Plan of Care Reviewed with: patient    This Plan of care has been discussed with the patient who was involved in its development and understands and is in agreement with the identified goals and treatment plan    GOALS:    Occupational Therapy Goals        Problem: Occupational Therapy Goal    Goal Priority Disciplines Outcome Interventions   Occupational Therapy Goal     OT, PT/OT Ongoing (interventions implemented as appropriate)    Description:  Goals to be met by: Discharge from facility     Patient will increase functional independence with ADLs by performing:    UE Dressing to be assessed as a more permanent immobilization splint is placed on left UE  LE Dressing to be assessed once weightbearing precautions are finalized once patient is seen by ortho  Toileting to be assessed once weightbearing precautions are finalized once patient is seen by ortho                       Time Tracking:     OT Date of Treatment: 05/15/18  OT Start Time: 0310  OT Stop Time: 0330  OT Total Time (min): 20 min    Billable Minutes:Therapeutic Activity 20 min    Ani Dueñas OT  5/15/2018

## 2018-05-15 NOTE — PLAN OF CARE
05/15/18 0934   Discharge Reassessment   Assessment Type Discharge Planning Reassessment     Sw contacted [facility] and spoke with Марина [phone] who advised that the pt's referral be faxed and sent through [facility]. [phone]. SW will continue to follow and offer support as needed.    Manolo Monroe LMSW

## 2018-05-15 NOTE — ASSESSMENT & PLAN NOTE
Ortho consulted via ER, non surgical orthopedic injuries per Dr Doc Wolf, non wt bearing injury to left upper ext Splinted   Se also can wt bear as tolerated to left lower ext

## 2018-05-15 NOTE — PLAN OF CARE
05/15/18 1137   Discharge Assessment   Assessment Type Discharge Planning Reassessment       Discussed plan of care with patient and spouse. Patient has asked for inpatient rehab before swing placement. [facility] re[facility] consulted and will submit for approval. Ortho to see pt tomorrow 5/16/2018.

## 2018-05-15 NOTE — SUBJECTIVE & OBJECTIVE
Interval History: see      Review of Systems   Constitutional: Negative for chills and fever.   Respiratory: Negative for shortness of breath.    Cardiovascular: Negative for chest pain and palpitations.   Gastrointestinal: Negative for abdominal pain, blood in stool, constipation and nausea.   Genitourinary: Negative for difficulty urinating and pelvic pain.   Musculoskeletal: Positive for arthralgias and myalgias.        Left hip pain with moving as well as left arm pain with moving   Skin:        Left middle toe bruised   Psychiatric/Behavioral: Negative for dysphoric mood, sleep disturbance and suicidal ideas. The patient is not nervous/anxious.      Objective:     Vital Signs (Most Recent):  Temp: 97.3 °F (36.3 °C) (05/15/18 0322)  Pulse: 71 (05/15/18 0714)  Resp: 17 (05/15/18 0714)  BP: 132/74 (05/15/18 0322)  SpO2: (!) 93 % (05/15/18 0714) Vital Signs (24h Range):  Temp:  [96.1 °F (35.6 °C)-98.7 °F (37.1 °C)] 97.3 °F (36.3 °C)  Pulse:  [] 71  Resp:  [17-18] 17  SpO2:  [90 %-99 %] 93 %  BP: (109-136)/(62-74) 132/74     Weight: 64 kg (141 lb)  Body mass index is 24.98 kg/m².    Intake/Output Summary (Last 24 hours) at 05/15/18 0810  Last data filed at 05/14/18 2200   Gross per 24 hour   Intake                0 ml   Output              400 ml   Net             -400 ml      Physical Exam   Constitutional: She is oriented to person, place, and time. She appears well-developed and well-nourished.   HENT:   Head: Normocephalic and atraumatic.   Eyes: EOM are normal. Pupils are equal, round, and reactive to light.   Neck: Normal range of motion. Neck supple.   Cardiovascular: Normal rate, regular rhythm, normal heart sounds and intact distal pulses.    No murmur heard.  Pulmonary/Chest: Effort normal and breath sounds normal. No respiratory distress. She has no wheezes. She has no rales. She exhibits no tenderness.   Abdominal: Soft. Bowel sounds are normal. She exhibits no distension and no mass. There is no  tenderness. There is no rebound and no guarding.   Musculoskeletal: Normal range of motion. She exhibits no edema or tenderness.   LUE posterior splint with ACE easily palp pulse    Left pelvic TTP foot palp pulse   Neurological: She is alert and oriented to person, place, and time. She has normal reflexes.   Skin: Skin is warm and dry. Capillary refill takes less than 2 seconds. No rash noted. No erythema. No pallor.   Psychiatric: She has a normal mood and affect. Her behavior is normal. Judgment and thought content normal.       Significant Labs:   Lab Results   Component Value Date    WBC 6.86 05/14/2018    HGB 11.6 (L) 05/14/2018    HCT 35.2 (L) 05/14/2018    MCV 89 05/14/2018     05/14/2018     BMP  Lab Results   Component Value Date     (L) 05/14/2018    K 3.9 05/14/2018    CL 98 05/14/2018    CO2 26 05/14/2018    BUN 8 05/14/2018    CREATININE 0.7 05/14/2018    CALCIUM 8.5 (L) 05/14/2018    ANIONGAP 8 05/14/2018    ESTGFRAFRICA >60 05/14/2018    EGFRNONAA >60 05/14/2018     Lab Results   Component Value Date    ALT 21 05/14/2018    AST 30 05/14/2018    ALKPHOS 83 05/14/2018    BILITOT 0.9 05/14/2018     Lab Results   Component Value Date    INR 1.1 05/11/2018     Urinalysis    Recent Labs  Lab 05/14/18  1142   COLORU Yellow   SPECGRAV 1.010   PHUR 7.0   PROTEINUA Negative   NITRITE Negative   LEUKOCYTESUR Negative   UROBILINOGEN 1.0         Significant Imaging:     Left hip x ray No definite evidence of a displaced fracture or dislocation.    The symphysis pubis is limited to evaluation due to angulation and positioning and a pubis fracture cannot be excluded..    CXR 2.3 cm confluence of shadows overlying the left upper lobe.  Consider nonurgent follow-up chest with lateral for further evaluation.    Left elbow x ray There is a fracture involving the radial neck.  Posterior fat pad is present compatible with an effusion.    Left hip x rays repeat  There are fractures involving the left  symphysis pubis and the inferior ramus which were limited to evaluation on the prior study.    No evidence of a fracture of the left hip.  No dislocation.  Vascular calcifications overlying the left femoral head and neck.

## 2018-05-15 NOTE — ASSESSMENT & PLAN NOTE
Ordering PT and nebs q 12 to reduce risk of atelectasis  Incentive spirometer >> encouraged use  Add OT today as she was living home with  prior to fall and able to take care of self and him  Consult inpt rehab to see if she would qualify

## 2018-05-15 NOTE — PT/OT/SLP PROGRESS
"Physical Therapy Treatment    Patient Name:  Merari Romero   MRN:  [id number]    Recommendations:     Discharge Recommendations:  rehabilitation facility, nursing facility, skilled   Discharge Equipment Recommendations:     Barriers to discharge: None    Assessment:     Merari Romero is a 78 y.o. female admitted with a medical diagnosis of Closed nondisplaced fracture of neck of left radius.  She presents with the following impairments/functional limitations:  weakness, gait instability, impaired balance, impaired endurance, impaired self care skills, impaired functional mobilty, decreased safety awareness .  Pt. demonstrated increased distance with gait training and is progressing towards PT POC goals.    Rehab Prognosis:  Fair; patient would benefit from acute skilled PT services to address these deficits and reach maximum level of function.      Recent Surgery: * No surgery found *      Plan:     During this hospitalization, patient to be seen  (1-2x/day(M-F); PRN(Sat.,Sun.)) to address the above listed problems via gait training, therapeutic activities, therapeutic exercises  · Plan of Care Expires:   (Upon D/C from facility)   Plan of Care Reviewed with: patient    Subjective     Communicated with patient prior to session.  Patient found supine in bed upon PT entry to room, agreeable to treatment.        Patient comments/goals: "It hurts to move."  Pain/Comfort:  · Pain Rating 1: 5/10  · Location - Side 1: Left  · Location - Orientation 1: generalized  · Location 1: hip  · Pain Addressed 1: Nurse notified, Reposition  · Pain Rating Post-Intervention 1: 3/10    Patients cultural, spiritual, Mormon conflicts given the current situation:      Objective:     Patient found with: bed alarm     General Precautions: Standard, fall   Orthopedic Precautions:LLE weight bearing as tolerated, LUE non weight bearing   Braces: N/A     Functional Mobility:  · Bed Mobility:     · Rolling Left:  moderate assistance  · Rolling " Right: moderate assistance  · Scooting: moderate assistance  · Bridging: moderate assistance  · Supine to Sit: moderate assistance  · Sit to Supine: moderate assistance  · Transfers:     · Sit to Stand:  moderate assistance with hemiwalker  · Bed to Chair: moderate assistance with  hemiwalker  using  Stand Pivot  · Gait: Gait Training:  Pt. amb. 5' with hemiwalker, mod. A.  VC's given to pt. for step placement and A.D. placement.  Pt. demonstrated decreased step height, decreased step length, decreased velocity and decreased toe-to-floor clearance.  Balance:   Static Sit: FAIR+: Able to take MINIMAL challenges from all directions  Dynamic Sit: FAIR+: Maintains balance through MINIMAL excursions of active trunk motion  Static Stand: POOR+: Needs MINIMAL assist to maintain  · Dynamic stand: POOR: N/A        AM-PAC 6 CLICK MOBILITY  Turning over in bed (including adjusting bedclothes, sheets and blankets)?: 2  Sitting down on and standing up from a chair with arms (e.g., wheelchair, bedside commode, etc.): 2  Moving from lying on back to sitting on the side of the bed?: 2  Moving to and from a bed to a chair (including a wheelchair)?: 2  Need to walk in hospital room?: 2  Climbing 3-5 steps with a railing?: 1  Total Score: 11       Therapeutic Activities and Exercises:   Therapeutic Activity:  Weight shifting and weight acceptance tasks performed with min. A. while pt. standing.  Transfer Training performed with assistance as noted.  VC's and manual guidance given to pt. for sequencing.  Trunk control tasks performed with min. A.while pt. seated EOB and in bedside chair.     Patient left up in chair with all lines intact, call button in reach, RN notified and family present..    GOALS:    Physical Therapy Goals        Problem: Physical Therapy Goal    Goal Priority Disciplines Outcome Goal Variances Interventions   Physical Therapy Goal     PT/OT, PT Ongoing (interventions implemented as appropriate)     Description:   Pt will advance recovery following  current left elbow and pelvic fractures by:  1. Assist with bed mobility leading to contact guard assist   2. Increase self help and therapeutic exercises for all extremities except left elbow  3  Progress to stand and transfer tasks with min assist   4. Iintiate walking with platform walker for left UE for short distances 25 ft/ with min assistance            Problem: Physical Therapy Goal    Goal Priority Disciplines Outcome Goal Variances Interventions   Physical Therapy Goal     PT/OT, PT      Description:  Advancing activities to accomplish anticiapted goals                    Time Tracking:     PT Received On: 05/15/18  PT Start Time: 1023     PT Stop Time: 1053  PT Total Time (min): 30 min     Billable Minutes: Gait Training 15 minutes and Therapeutic Activity 15 minutes    Treatment Type: Treatment  PT/PTA: PT           Dipak Lemos, PT  05/15/2018

## 2018-05-15 NOTE — PLAN OF CARE
05/15/18 1113   Discharge Reassessment   Assessment Type Discharge Planning Reassessment     Zain spoke with Марина from [facility] in rehab and she stated that submit for approval for pt admit to [facility]. ZAIN will continue to follow and offer support as needed.    Manolo Monroe LMSW

## 2018-05-15 NOTE — PT/OT/SLP PROGRESS
"Physical Therapy Treatment    Patient Name:  Merari Romero   MRN:  [id number]    Recommendations:     Discharge Recommendations:  rehabilitation facility, nursing facility, skilled   Discharge Equipment Recommendations: other (see comments) (pending)   Barriers to discharge: None    Assessment:     Merari Romero is a 78 y.o. female admitted with a medical diagnosis of Closed nondisplaced fracture of neck of left radius.  She presents with the following impairments/functional limitations:  weakness, gait instability, impaired balance, impaired endurance, decreased lower extremity function, impaired self care skills, impaired functional mobilty, decreased safety awareness, decreased upper extremity function .  Pt. is progressing towards PT POC goals and demonstrates increased endurance.    Rehab Prognosis:  Fair; patient would benefit from acute skilled PT services to address these deficits and reach maximum level of function.      Recent Surgery: * No surgery found *      Plan:     During this hospitalization, patient to be seen  (1-2x/day(M-F); PRN(Sat.,Sun.)) to address the above listed problems via therapeutic exercises, therapeutic activities, gait training  · Plan of Care Expires:   (Upon D/C from facility)   Plan of Care Reviewed with: patient, spouse    Subjective     Communicated with patient prior to session.  Patient found seated in bedside chair upon PT entry to room, agreeable to treatment.        Patient comments/goals: "I need to get back to bed."  Pain/Comfort:  · Pain Rating 1: 5/10  · Location - Side 1: Left  · Location - Orientation 1: generalized  · Location 1: hip  · Pain Addressed 1: Nurse notified, Reposition  · Pain Rating Post-Intervention 1: 3/10    Patients cultural, spiritual, Adventism conflicts given the current situation:      Objective:     Patient found with: bed alarm     General Precautions: Standard, fall   Orthopedic Precautions:LLE weight bearing as tolerated, LUE non weight bearing "   Braces: N/A     Functional Mobility:  · Bed Mobility:     · Rolling Left:  moderate assistance  · Rolling Right: moderate assistance  · Scooting: moderate assistance  · Bridging: moderate assistance  · Supine to Sit: moderate assistance  · Sit to Supine: moderate assistance  · Transfers:     · Sit to Stand:  moderate assistance with hemiwalker  · Bed to Chair: moderate assistance with  hemiwalker  using  Stand Pivot  Balance:   Static Sit: FAIR+: Able to take MINIMAL challenges from all directions  Dynamic Sit: FAIR+: Maintains balance through MINIMAL excursions of active trunk motion  Static Stand: POOR+: Needs MINIMAL assist to maintain  · Dynamic stand: POOR: N/A  ·       AM-PAC 6 CLICK MOBILITY  Turning over in bed (including adjusting bedclothes, sheets and blankets)?: 2  Sitting down on and standing up from a chair with arms (e.g., wheelchair, bedside commode, etc.): 2  Moving from lying on back to sitting on the side of the bed?: 2  Moving to and from a bed to a chair (including a wheelchair)?: 2  Need to walk in hospital room?: 2  Climbing 3-5 steps with a railing?: 1  Total Score: 11       Therapeutic Activities and Exercises:   Therapeutic Activity:  Weight shifting and weight acceptance tasks performed with min. A. while pt. standing.  Transfer Training performed with assistance as noted.  VC's and manual guidance given to pt. for sequencing.  Trunk control tasks performed with min. A.while pt. seated EOB and in bedside chair.     There. Ex.:  Pt. performed gentle A/A exercises of BLE's while supine in bed, for N.M. Tone and strength and fluid dynamics.  1-on-1 BLE HC stretches performed with pt.  BLE: ankle pumps, heel slides, hip abd/add (2x15) performed with pt.    Patient left HOB elevated with all lines intact, call button in reach and RN notified..    GOALS:    Physical Therapy Goals        Problem: Physical Therapy Goal    Goal Priority Disciplines Outcome Goal Variances Interventions   Physical  Therapy Goal     PT/OT, PT Ongoing (interventions implemented as appropriate)     Description:  Pt will advance recovery following  current left elbow and pelvic fractures by:  1. Assist with bed mobility leading to contact guard assist   2. Increase self help and therapeutic exercises for all extremities except left elbow  3  Progress to stand and transfer tasks with min assist   4. Iintiate walking with platform walker for left UE for short distances 25 ft/ with min assistance            Problem: Physical Therapy Goal    Goal Priority Disciplines Outcome Goal Variances Interventions   Physical Therapy Goal     PT/OT, PT      Description:  Advancing activities to accomplish anticiapted goals                    Time Tracking:     PT Received On: 05/15/18  PT Start Time: 1243     PT Stop Time: 1306  PT Total Time (min): 23 min     Billable Minutes: Therapeutic Activity 15 minutes and Therapeutic Exercise 8 minutes    Treatment Type: Treatment  PT/PTA: PT           Dipak Lemos, PT  05/15/2018

## 2018-05-15 NOTE — PLAN OF CARE
Problem: Fall Risk (Adult)  Goal: Absence of Falls  Patient will demonstrate the desired outcomes by discharge/transition of care.   Outcome: Ongoing (interventions implemented as appropriate)  Fall precautions maintained. Bed alarm engaged at all times.     Problem: Patient Care Overview  Goal: Plan of Care Review  Outcome: Ongoing (interventions implemented as appropriate)  Plan of care reviewed with patient and she agrees with the plan of care. Uneventful night patient rested well. No acute distress noted.     Problem: Pain, Acute (Adult)  Goal: Acceptable Pain Control/Comfort Level  Patient will demonstrate the desired outcomes by discharge/transition of care.   Outcome: Ongoing (interventions implemented as appropriate)  Hydrocodone and Morphine effective for c/o left hip pain.

## 2018-05-16 PROCEDURE — 25000003 PHARM REV CODE 250: Performed by: SURGERY

## 2018-05-16 PROCEDURE — 94761 N-INVAS EAR/PLS OXIMETRY MLT: CPT

## 2018-05-16 PROCEDURE — 97530 THERAPEUTIC ACTIVITIES: CPT

## 2018-05-16 PROCEDURE — 94640 AIRWAY INHALATION TREATMENT: CPT

## 2018-05-16 PROCEDURE — 99232 SBSQ HOSP IP/OBS MODERATE 35: CPT | Mod: ,,, | Performed by: FAMILY MEDICINE

## 2018-05-16 PROCEDURE — 11000001 HC ACUTE MED/SURG PRIVATE ROOM

## 2018-05-16 PROCEDURE — 97116 GAIT TRAINING THERAPY: CPT

## 2018-05-16 PROCEDURE — 97110 THERAPEUTIC EXERCISES: CPT

## 2018-05-16 PROCEDURE — 63600175 PHARM REV CODE 636 W HCPCS: Performed by: FAMILY MEDICINE

## 2018-05-16 PROCEDURE — 94799 UNLISTED PULMONARY SVC/PX: CPT

## 2018-05-16 PROCEDURE — 25000003 PHARM REV CODE 250: Performed by: FAMILY MEDICINE

## 2018-05-16 PROCEDURE — 25000242 PHARM REV CODE 250 ALT 637 W/ HCPCS: Performed by: FAMILY MEDICINE

## 2018-05-16 RX ADMIN — MORPHINE SULFATE 2 MG: 4 INJECTION INTRAVENOUS at 11:05

## 2018-05-16 RX ADMIN — HYDROCODONE BITARTRATE AND ACETAMINOPHEN 1 TABLET: 5; 325 TABLET ORAL at 11:05

## 2018-05-16 RX ADMIN — DULOXETINE 30 MG: 30 CAPSULE, DELAYED RELEASE ORAL at 08:05

## 2018-05-16 RX ADMIN — ATORVASTATIN CALCIUM 20 MG: 20 TABLET, FILM COATED ORAL at 08:05

## 2018-05-16 RX ADMIN — DOCUSATE SODIUM 100 MG: 100 CAPSULE, LIQUID FILLED ORAL at 08:05

## 2018-05-16 RX ADMIN — HYDROCODONE BITARTRATE AND ACETAMINOPHEN 1 TABLET: 5; 325 TABLET ORAL at 08:05

## 2018-05-16 RX ADMIN — IPRATROPIUM BROMIDE AND ALBUTEROL SULFATE 3 ML: 2.5; .5 SOLUTION RESPIRATORY (INHALATION) at 07:05

## 2018-05-16 RX ADMIN — HYDROCODONE BITARTRATE AND ACETAMINOPHEN 1 TABLET: 5; 325 TABLET ORAL at 07:05

## 2018-05-16 RX ADMIN — ENOXAPARIN SODIUM 40 MG: 100 INJECTION SUBCUTANEOUS at 04:05

## 2018-05-16 RX ADMIN — HYDROCODONE BITARTRATE AND ACETAMINOPHEN 1 TABLET: 5; 325 TABLET ORAL at 04:05

## 2018-05-16 RX ADMIN — AMLODIPINE BESYLATE 5 MG: 5 TABLET ORAL at 08:05

## 2018-05-16 RX ADMIN — AMITRIPTYLINE HYDROCHLORIDE 25 MG: 25 TABLET, FILM COATED ORAL at 08:05

## 2018-05-16 RX ADMIN — PANTOPRAZOLE SODIUM 40 MG: 40 TABLET, DELAYED RELEASE ORAL at 08:05

## 2018-05-16 RX ADMIN — METOPROLOL SUCCINATE 50 MG: 50 TABLET, EXTENDED RELEASE ORAL at 08:05

## 2018-05-16 NOTE — PT/OT/SLP PROGRESS
Occupational Therapy   Treatment    Name: Merari Romero  MRN: [id number]  Admitting Diagnosis:  Closed nondisplaced fracture of neck of left radius       Recommendations:     Discharge Recommendations:  (needs continuing assessment)  Discharge Equipment Recommendations:   (needs continuing assessment)    Subjective     Communicated with: nursing prior to session.  Pain/Comfort:  · Pain Rating 1: 5/10  · Location - Side 1: Left  · Pain Addressed 1:  (Pt reported she was comfortable after activity and didn't currently need anything)    Patients cultural, spiritual, Sabianism conflicts given the current situation:  (none verbalized)    Objective:     Patient found with:  (sitting up in bed side chair)    General Precautions: Standard, fall   Orthopedic Precautions:LUE non weight bearing, LLE weight bearing as tolerated   Braces:  (left UE sling and splint)     Occupational Performance:    Activities of Daily Living:  · Grooming: minimum assistance due to left UE in sling   · Spoke with patient about asking someone if possible to bring clothes in order for patient to learn flora dressing techinques. Patient verbalized she would ask.    Patient left sitting in bedside chair with all lines intact, call button in reach and spouse present    Treatment & Education:  Completed left hand finger tendon glides, digit abduction/adduction, and thumb opposition all 2 x 10 reps  Education:    Assessment:     Merari Romero is a 78 y.o. female with a medical diagnosis of Closed nondisplaced fracture of neck of left radius.  Performance deficits affecting function are weakness, impaired sensation, impaired self care skills, impaired functional mobilty, pain, decreased upper extremity function.      Rehab Prognosis:  good; patient would benefit from acute skilled OT services to address these deficits and reach maximum level of function.       Plan:     Patient to be seen 3 x/week, 5 x/week to address the above listed problems via  self-care/home management, therapeutic activities, therapeutic exercises  · Plan of Care Expires:  (d/c from facility)  · Plan of Care Reviewed with: patient    This Plan of care has been discussed with the patient who was involved in its development and understands and is in agreement with the identified goals and treatment plan    GOALS:    Occupational Therapy Goals        Problem: Occupational Therapy Goal    Goal Priority Disciplines Outcome Interventions   Occupational Therapy Goal     OT, PT/OT Ongoing (interventions implemented as appropriate)    Description:  Goals to be met by: Discharge from facility     Patient will increase functional independence with ADLs by performing:    UE Dressing to be assessed as a more permanent immobilization splint is placed on left UE  LE Dressing to be assessed once weightbearing precautions are finalized once patient is seen by ortho  Toileting to be assessed once weightbearing precautions are finalized once patient is seen by ortho                       Time Tracking:     OT Date of Treatment: 05/16/18  OT Start Time: 1400  OT Stop Time: 1410  OT Total Time (min): 10 min    Billable Minutes:Therapeutic Exercise 10 min    LEYLA Plascencia  5/16/2018

## 2018-05-16 NOTE — ASSESSMENT & PLAN NOTE
Non operative   Pain control try and cont po as much as poss  Try and reduce risk of associated complications of limited mobility(IS, nebs, PT)  Discussed with [facility] GONZALEZ ortho. Seen patient today, cont non wt bearing to left upper ext with long arm splint and wt bearing as tolerated to left lower ext. F/u Dr Lemos 3 weeks for repeat films

## 2018-05-16 NOTE — CONSULTS
[facility]  Orthopedic Surgery  Consult Note    Patient Name: Merari Romero  MRN: [id number]  Admission Date: 5/11/2018  Hospital Length of Stay: 2  Code Status: Full Code  Attending Physician: Michael Chacon MD  Primary Care Physician: Shaun Barraza MD  Principal Problem:Closed nondisplaced fracture of neck of left radius      Subjective:     HPI: 77 y/o white female who suffered a fall while trying to navigate stairs at her home. She lives at home with her . Transported to [facility] for evaluation and treatment. Ortho consulted for radial neck and pelvic fractures    Review of Systems:  Constitutional: no fever or chills  Respiratory: no cough or shorness of breath  Cardiovascular: no chest pain or palpitations  Gastrointestinal: no vomiting  Musculoskeletal: admits to pain in joints and trouble walking sometimes      Past Medical History:   Diagnosis Date    DVT (deep venous thrombosis)     left leg    Hypertension     Neuropathy     Osteoporosis        Past Surgical History:   Procedure Laterality Date    FEMUR SURGERY      repair - left leg    HYSTERECTOMY  1970's    JOINT REPLACEMENT      left knee    NASAL FRACTURE SURGERY         Family History   Problem Relation Age of Onset    Adopted: Yes    Heart disease Mother     Heart disease Father     Breast cancer Neg Hx     Colon cancer Neg Hx     Ovarian cancer Neg Hx        Social History   Substance Use Topics    Smoking status: Former Smoker     Years: 40.00     Types: Cigarettes     Quit date: 8/9/2000    Smokeless tobacco: Never Used    Alcohol use No       Prior to Admission medications    Medication Sig Start Date End Date Taking? Authorizing Provider   amitriptyline (ELAVIL) 25 MG tablet Take 25 mg by mouth every evening. 2/6/18  Yes Historical Provider, MD   amlodipine (NORVASC) 5 MG tablet take 1 tablet by mouth once daily 5/1/14  Yes Rosa Vasquez NP   atorvastatin (LIPITOR) 20 MG tablet Take 20 mg  by mouth once daily. 3/8/16  Yes Historical Provider, MD   cilostazol (PLETAL) 50 MG Tab Take 50 mg by mouth 2 (two) times daily.   Yes Historical Provider, MD   duloxetine (CYMBALTA) 30 MG capsule Take 1 capsule (30 mg total) by mouth once daily. 3/14/17 5/11/18 Yes Anne Marie Sadnra NP   fexofenadine (ALLEGRA) 60 MG tablet Take 1 tablet (60 mg total) by mouth once daily. 8/9/12  Yes Rosa Vasquez NP   hydrocodone-acetaminophen 7.5-325mg (NORCO) 7.5-325 mg per tablet Take 1 tablet by mouth 3 (three) times daily. 2/26/16  Yes Historical Provider, MD   meclizine (ANTIVERT) 25 mg tablet Take 25 mg by mouth every 6 (six) hours.  2/5/16  Yes Historical Provider, MD   metoprolol succinate (TOPROL-XL) 50 MG 24 hr tablet  12/19/17  Yes Historical Provider, MD   omeprazole (PRILOSEC) 40 MG capsule  11/30/17  Yes Historical Provider, MD   ranitidine (ZANTAC) 300 MG tablet  11/30/17  Yes Historical Provider, MD   ipratropium (ATROVENT) 0.02 % nebulizer solution Take 2.5 mLs (0.5 mg total) by nebulization 4 (four) times daily. 4/15/14 4/15/15  Janae Daly, NP   ketoconazole (NIZORAL) 2 % cream  1/21/16   Historical Provider, MD     Objective:     Vital Signs (Most Recent):     Vital Signs (24h Range): Temp:  [96 °F (35.6 °C)-98.4 °F (36.9 °C)] 96 °F (35.6 °C)  Pulse:  [71-93] 79  Resp:  [17-18] 18  SpO2:  [93 %-97 %] 94 %  BP: (112-135)/(64-82) 135/72         Body mass index is 24.98 kg/m².      Intake/Output Summary (Last 24 hours) at 05/16/18 0635  Last data filed at 05/16/18 0500   Gross per 24 hour   Intake              240 ml   Output             1150 ml   Net             -910 ml     Intake/Output - Last 3 Shifts       05/14 0700 - 05/15 0659 05/15 0700 - 05/16 0659    P.O.  240    Total Intake(mL/kg)  240 (3.8)    Urine (mL/kg/hr) 400 (0.3) 1150 (0.7)    Total Output 400 1150    Net -400 -910          Urine Occurrence 2 x     Stool Occurrence 2 x           Lines/Drains/Airways     Peripheral Intravenous  Line                 Peripheral IV - Single Lumen 05/12/18 0550 Right Hand 4 days                   Physical Exam:  General: mild distress secondary to elbow/pelvic pain pain; pleasant during exam  Lungs: normal respiratory effort   Pulses: all distal pulses normal   Extremities: left long arm splint in place appropriately; NVI left hand and fingers; NVI BLE           Significant Labs:   Recent Lab Results       05/14/18  1142 05/14/18  0830      Albumin  2.7(L)     Alkaline Phosphatase  83     ALT  21     Anion Gap  8     Appearance, UA Clear      AST  30     Baso #  0.02     Basophil%  0.3     Bilirubin (UA) Negative      Total Bilirubin  0.9  Comment:  For infants and newborns, interpretation of results should be based  on gestational age, weight and in agreement with clinical  observations.  Premature Infant recommended reference ranges:  Up to 24 hours.............<8.0 mg/dL  Up to 48 hours............<12.0 mg/dL  3-5 days..................<15.0 mg/dL  6-29 days.................<15.0 mg/dL       BUN, Bld  8     Calcium  8.5(L)     Chloride  98     CO2  26     Color, UA Yellow      Creatinine  0.7     Differential Method  Automated     eGFR if   >60     eGFR if non   >60  Comment:  Calculation used to obtain the estimated glomerular filtration  rate (eGFR) is the CKD-EPI equation.        Eos #  0.5     Eosinophil%  7.3     Glucose  123(H)     Glucose, UA Negative      Gran # (ANC)  4.2     Gran%  61.2     Hematocrit  35.2(L)     Hemoglobin  11.6(L)     Ketones, UA Negative      Leukocytes, UA Negative      Lymph #  1.6     Lymph%  23.5     MCH  29.4     MCHC  33.0     MCV  89     Mono #  0.5     Mono%  7.7     MPV  10.3     Nitrite, UA Negative      Occult Blood UA Negative      pH, UA 7.0      Platelets  170     Potassium  3.9     Total Protein  6.6     Protein, UA Negative  Comment:  Recommend a 24 hour urine protein or a urine   protein/creatinine ratio if globulin induced  proteinuria is  clinically suspected.        RBC  3.94(L)     RDW  13.8     Sodium  132(L)     Specific Gravity, UA 1.010      Specimen UA Urine, Clean Catch      Urobilinogen, UA 1.0      WBC  6.86           Significant Imaging:   Imaging Results          X-Ray Hip 2 View Left (Final result)  Result time 05/11/18 16:11:21    Final result by Jason Roche MD (05/11/18 16:11:21)                 Impression:      As above.      Electronically signed by: Jason Roche MD  Date:    05/11/2018  Time:    16:11             Narrative:    EXAMINATION:  XR HIP 2 VIEW LEFT    CLINICAL HISTORY:  .  Unspecified fall, initial encounter    TECHNIQUE:  Repeat views of the left hip with spine board removed dated May 11, 2018.    COMPARISON:  Recent previous study from earlier on May 11, 2018.    FINDINGS:  There are fractures involving the left symphysis pubis and the inferior ramus which were limited to evaluation on the prior study.    No evidence of a fracture of the left hip.  No dislocation.  Vascular calcifications overlying the left femoral head and neck.                               X-Ray Elbow Complete Left (Final result)  Result time 05/11/18 15:35:32    Final result by Jason Roche MD (05/11/18 15:35:32)                 Impression:      As above.      Electronically signed by: Jason Roche MD  Date:    05/11/2018  Time:    15:35             Narrative:    EXAMINATION:  XR ELBOW COMPLETE 3 VIEW LEFT    CLINICAL HISTORY:  .  Unspecified fall, initial encounter    TECHNIQUE:  Two views of the left elbow dated May 11, 2018.    COMPARISON:  No prior study for comparison.    FINDINGS:  There is a fracture involving the radial neck.  Posterior fat pad is present compatible with an effusion.                               X-Ray Chest 1 View (Final result)  Result time 05/11/18 15:31:12    Final result by Jason Roche MD (05/11/18 15:31:12)                 Impression:      2.3 cm confluence of shadows  overlying the left upper lobe.  Consider nonurgent follow-up chest with lateral for further evaluation.      Electronically signed by: Jason Roche MD  Date:    05/11/2018  Time:    15:31             Narrative:    EXAMINATION:  XR CHEST 1 VIEW    CLINICAL HISTORY:  Fall;.    TECHNIQUE:  Single view chest dated May 11, 2018.    COMPARISON:  Prior study of July 8, 2014.    FINDINGS:  The cardiac silhouette is within normal limits.  Atherosclerotic changes of the aorta.    There is a 2.3 cm confluence of shadows overlying the left upper lobe.  This may represent a combination of normal lung parenchyma with superimposed margin of the scapula.  Nodule is not excluded.  No effusion or pneumothorax.    Degenerative changes of the spine.                               X-Ray Hip 2 View Left (Final result)  Result time 05/11/18 15:34:09    Final result by Jason Roche MD (05/11/18 15:34:09)                 Impression:      As above.      Electronically signed by: Jason Roche MD  Date:    05/11/2018  Time:    15:34             Narrative:    EXAMINATION:  XR HIP 2 VIEW LEFT    CLINICAL HISTORY:  .  Unspecified fall, initial encounter    TECHNIQUE:  Two views of the left hip dated May 11, 2018.  Examination is limited by positioning and penetration.    COMPARISON:  No prior study for comparison.    FINDINGS:  No definite evidence of a displaced fracture or dislocation.    The symphysis pubis is limited to evaluation due to angulation and positioning and a pubis fracture cannot be excluded..                                Scheduled Meds:   albuterol-ipratropium 2.5mg-0.5mg/3mL  3 mL Nebulization Q12H    amitriptyline  25 mg Oral QHS    amLODIPine  5 mg Oral Daily    atorvastatin  20 mg Oral Daily    docusate sodium  100 mg Oral BID    DULoxetine  30 mg Oral Daily    enoxaparin  40 mg Subcutaneous Daily    metoprolol succinate  50 mg Oral Daily    pantoprazole  40 mg Oral Daily     Continuous Infusions:  PRN  Meds:.acetaminophen, hydrocodone-acetaminophen, morphine, ondansetron, promethazine (PHENERGAN) IVPB    Assessment and Plan:     Patient Active Problem List   Diagnosis    HTN (hypertension)    Neuropathy of lower extremity    Seasonal allergic rhinitis    Lumbar facet arthropathy    Cervical spondylosis    Neuralgia and neuritis    OA (osteoarthritis)    Lower back pain    Hyperlipidemia LDL goal <70    Closed nondisplaced fracture of neck of left radius    Closed fracture of left pubis    History of deep venous thrombosis    Bed confinement status    Urinary hesitancy    Fall       ASSESSMENT:    Left symphysis pubic and inferior ramus fractures  Left radial neck fracture, non displaced  PLAN:      OOB with PT, WBAT BLE.   NWB LUE  Maintain long arm posterior splint left arm.   F/U with Dr. Lemos in 3 weeks for repeat xrays.     Thank you for allowing me to see your patient. Please let me know if you have any further questions.       Lenny Urbina NP  Department of Orthopedic Surgery  [facility]

## 2018-05-16 NOTE — PLAN OF CARE
Problem: Fall Risk (Adult)  Goal: Absence of Falls  Patient will demonstrate the desired outcomes by discharge/transition of care.   Outcome: Ongoing (interventions implemented as appropriate)  Fall precautions maintained. Bed alarm engaged at all times.     Problem: Patient Care Overview  Goal: Plan of Care Review  Outcome: Ongoing (interventions implemented as appropriate)  Plan of care reviewed with patient and she agrees with the plan of care. Uneventful night patient rested well. No acute distress noted.     Problem: Pain, Acute (Adult)  Goal: Acceptable Pain Control/Comfort Level  Patient will demonstrate the desired outcomes by discharge/transition of care.   Outcome: Ongoing (interventions implemented as appropriate)  Morphine effective for c/o left hip pain.

## 2018-05-16 NOTE — SUBJECTIVE & OBJECTIVE
Interval History: see      Review of Systems   Constitutional: Negative for chills and fever.   Respiratory: Negative for shortness of breath.    Cardiovascular: Negative for chest pain and palpitations.   Gastrointestinal: Negative for abdominal pain, blood in stool, constipation and nausea.   Genitourinary: Negative for difficulty urinating and pelvic pain.   Musculoskeletal: Positive for arthralgias and myalgias.        Left hip pain with moving as well as left arm pain with moving   Skin:        Left middle toe bruised   Psychiatric/Behavioral: Negative for dysphoric mood, sleep disturbance and suicidal ideas. The patient is not nervous/anxious.      Objective:     Vital Signs (Most Recent):  Temp: 97.2 °F (36.2 °C) (05/16/18 0705)  Pulse: 85 (05/16/18 0721)  Resp: 18 (05/16/18 0721)  BP: 136/71 (05/16/18 0705)  SpO2: 95 % (05/16/18 0721) Vital Signs (24h Range):  Temp:  [96 °F (35.6 °C)-98.4 °F (36.9 °C)] 97.2 °F (36.2 °C)  Pulse:  [79-93] 85  Resp:  [17-18] 18  SpO2:  [92 %-97 %] 95 %  BP: (112-136)/(64-82) 136/71     Weight: 64 kg (141 lb)  Body mass index is 24.98 kg/m².    Intake/Output Summary (Last 24 hours) at 05/16/18 0753  Last data filed at 05/16/18 0500   Gross per 24 hour   Intake              240 ml   Output             1150 ml   Net             -910 ml      Physical Exam   Constitutional: She is oriented to person, place, and time. She appears well-developed and well-nourished.   HENT:   Head: Normocephalic and atraumatic.   Eyes: EOM are normal. Pupils are equal, round, and reactive to light.   Neck: Normal range of motion. Neck supple.   Cardiovascular: Normal rate, regular rhythm, normal heart sounds and intact distal pulses.    No murmur heard.  Pulmonary/Chest: Effort normal and breath sounds normal. No respiratory distress. She has no wheezes. She has no rales. She exhibits no tenderness.   Abdominal: Soft. Bowel sounds are normal. She exhibits no distension and no mass. There is no  tenderness. There is no rebound and no guarding.   Musculoskeletal: Normal range of motion. She exhibits no edema or tenderness.   LUE posterior splint with ACE easily palp pulse    Left pelvic TTP foot palp pulse   Neurological: She is alert and oriented to person, place, and time. She has normal reflexes.   Skin: Skin is warm and dry. Capillary refill takes less than 2 seconds. No rash noted. No erythema. No pallor.   Psychiatric: She has a normal mood and affect. Her behavior is normal. Judgment and thought content normal.       Significant Labs:   Lab Results   Component Value Date    WBC 6.86 05/14/2018    HGB 11.6 (L) 05/14/2018    HCT 35.2 (L) 05/14/2018    MCV 89 05/14/2018     05/14/2018     BMP  Lab Results   Component Value Date     (L) 05/14/2018    K 3.9 05/14/2018    CL 98 05/14/2018    CO2 26 05/14/2018    BUN 8 05/14/2018    CREATININE 0.7 05/14/2018    CALCIUM 8.5 (L) 05/14/2018    ANIONGAP 8 05/14/2018    ESTGFRAFRICA >60 05/14/2018    EGFRNONAA >60 05/14/2018     Lab Results   Component Value Date    ALT 21 05/14/2018    AST 30 05/14/2018    ALKPHOS 83 05/14/2018    BILITOT 0.9 05/14/2018     Lab Results   Component Value Date    INR 1.1 05/11/2018     Urinalysis     Recent Labs  Lab 05/14/18  1142   COLORU Yellow   SPECGRAV 1.010   PHUR 7.0   PROTEINUA Negative   NITRITE Negative   LEUKOCYTESUR Negative   UROBILINOGEN 1.0         Significant Imaging:     Left hip x ray No definite evidence of a displaced fracture or dislocation.    The symphysis pubis is limited to evaluation due to angulation and positioning and a pubis fracture cannot be excluded..    CXR 2.3 cm confluence of shadows overlying the left upper lobe.  Consider nonurgent follow-up chest with lateral for further evaluation.    Left elbow x ray There is a fracture involving the radial neck.  Posterior fat pad is present compatible with an effusion.    Left hip x rays repeat  There are fractures involving the left  symphysis pubis and the inferior ramus which were limited to evaluation on the prior study.    No evidence of a fracture of the left hip.  No dislocation.  Vascular calcifications overlying the left femoral head and neck.

## 2018-05-16 NOTE — ASSESSMENT & PLAN NOTE
Ordering PT and nebs q 12 to reduce risk of atelectasis  Incentive spirometer >> encouraged use  Add OT today as she was living home with  prior to fall and able to take care of self and him  Consult inpt rehab to see if she would qualify as this is patients request she was declined as she can not tolerate 3hr. She is progressing with PT but slow. Will ask for SWING today

## 2018-05-16 NOTE — NURSING
Report received from Chloe BROOKS. Pt lying in bed with sling in place to left arm. Family at bedside. Vitals stable. Heel booties on and heels elevated off bed. Call bell within reach. Bed in low, locked position.

## 2018-05-16 NOTE — PLAN OF CARE
05/16/18 1230   Discharge Assessment   Assessment Type Discharge Planning Reassessment     Patient has been denied IP rehab by [facility]. Denial letter received and copy given to patient. CM called [facility] to begin skilled nursing auth. Pending auth number is [id number]. Awaiting reply. Patient and  updated.

## 2018-05-16 NOTE — PT/OT/SLP PROGRESS
"Physical Therapy Treatment    Patient Name:  Merari Romero   MRN:  [id number]    Recommendations:     Discharge Recommendations:  rehabilitation facility, nursing facility, skilled   Discharge Equipment Recommendations:  (TBD)   Barriers to discharge: None    Assessment:     Merari Romero is a 78 y.o. female admitted with a medical diagnosis of Closed nondisplaced fracture of neck of left radius.  She presents with the following impairments/functional limitations:  weakness, impaired endurance, impaired self care skills, impaired functional mobilty, gait instability, impaired balance, decreased upper extremity function, decreased lower extremity function, decreased safety awareness Pt progressing well with POC goals.    Rehab Prognosis:  Good; patient would benefit from acute skilled PT services to address these deficits and reach maximum level of function.      Recent Surgery: * No surgery found *      Plan:     During this hospitalization, patient to be seen  (1-2x/day Monday-Friday; PRN Weekends/Holidays) to address the above listed problems via gait training, therapeutic activities, therapeutic exercises  · Plan of Care Expires:   (upon D/C from facility)   Plan of Care Reviewed with: patient, spouse    Subjective     Communicated with patient prior to session.  Patient found supine in bed upon PT entry to room, agreeable to treatment.      Chief Complaint: "Just pain sometimes"  Patient comments/goals: "Go home"  Pain/Comfort:  · Pain Rating 1: 5/10  · Location - Side 1: Left  · Location - Orientation 1: generalized  · Location 1: hip  · Pain Addressed 1: Pre-medicate for activity, Nurse notified  · Pain Rating Post-Intervention 1: 5/10    Patients cultural, spiritual, Restorationism conflicts given the current situation:      Objective:     Patient found with: bed alarm     General Precautions: Standard, fall   Orthopedic Precautions:LUE non weight bearing, LLE weight bearing as tolerated   Braces: UE Sling "     Functional Mobility:  · Bed Mobility:     · Rolling Left:  minimum assistance  · Rolling Right: minimum assistance  · Supine to Sit: minimum assistance  · Sit to Supine: minimum assistance  · Transfers:     · Sit to Stand:  minimum assistance with hemiwalker  · Bed to Chair: minimum assistance with  hemiwalker  using  Step Transfer  · Gait: Gait training x 10 steps with hemiwalker and mod assist wtih cueing for gait sequencing and proper use of AD to decrease pt fall risk.       AM-PAC 6 CLICK MOBILITY  Turning over in bed (including adjusting bedclothes, sheets and blankets)?: 2  Sitting down on and standing up from a chair with arms (e.g., wheelchair, bedside commode, etc.): 2  Moving from lying on back to sitting on the side of the bed?: 2  Moving to and from a bed to a chair (including a wheelchair)?: 2  Need to walk in hospital room?: 2  Climbing 3-5 steps with a railing?: 1  Total Score: 11     Patient left up in chair with all lines intact, call button in reach and NSG\ notified..    GOALS:    Physical Therapy Goals        Problem: Physical Therapy Goal    Goal Priority Disciplines Outcome Goal Variances Interventions   Physical Therapy Goal     PT/OT, PT Ongoing (interventions implemented as appropriate)     Description:  Pt will advance recovery following  current left elbow and pelvic fractures by:  1. Assist with bed mobility leading to contact guard assist   2. Increase self help and therapeutic exercises for all extremities except left elbow  3  Progress to stand and transfer tasks with min assist   4. Iintiate walking with platform walker for left UE for short distances 25 ft/ with min assistance            Problem: Physical Therapy Goal    Goal Priority Disciplines Outcome Goal Variances Interventions   Physical Therapy Goal     PT/OT, PT      Description:  Advancing activities to accomplish anticiapted goals                    Time Tracking:     PT Received On: 05/16/18  PT Start Time: 8687      PT Stop Time: 1448  PT Total Time (min): 23 min     Billable Minutes: Gait Training 13 minutes and Therapeutic Activity 10 minutes    Treatment Type: Treatment  PT/PTA: PT           Paige Ramirez, PT  05/16/2018

## 2018-05-16 NOTE — PLAN OF CARE
05/16/18 0821   Discharge Reassessment   Assessment Type Discharge Planning Reassessment     Zain spoke with Марина from [facility] she reported that [employer] has denied the pt's claim for in rehab services. Марина stated that an appeal can be filed with [employer] to reverse the decision. Sw will inform the pt of [employer]'s decision. Sw will continue to follow and offer support as needed.    Manolo Monroe LMSW

## 2018-05-16 NOTE — PT/OT/SLP PROGRESS
"Physical Therapy Treatment    Patient Name:  Merari Romero   MRN:  [id number]    Recommendations:     Discharge Recommendations:  nursing facility, skilled, rehabilitation facility   Discharge Equipment Recommendations:  (TBD)   Barriers to discharge: None    Assessment:     Merari Romero is a 78 y.o. female admitted with a medical diagnosis of Closed nondisplaced fracture of neck of left radius.  She presents with the following impairments/functional limitations:  weakness, impaired endurance, impaired self care skills, impaired functional mobilty, gait instability, impaired balance, decreased upper extremity function, decreased lower extremity function, decreased safety awareness, pain . Pt progressing well with POC goals. Pt continues with great motivation with PT.    Rehab Prognosis:  Good; patient would benefit from acute skilled PT services to address these deficits and reach maximum level of function.      Recent Surgery: * No surgery found *      Plan:     During this hospitalization, patient to be seen  (1-2x/day Monday-Friday; PRN weekends/Holidays) to address the above listed problems via therapeutic activities, gait training, therapeutic exercises  · Plan of Care Expires:   (upon D/C from facility)   Plan of Care Reviewed with: patient, spouse    Subjective     Communicated with patient prior to session.  Patient found supine in bed upon PT entry to room, agreeable to treatment.      Chief Complaint: "Just hurt sometime."  Patient comments/goals: "Go home"  Pain/Comfort:  · Pain Rating 1: 5/10  · Location - Side 1: Left  · Location - Orientation 1: generalized  · Location 1: hip  · Pain Addressed 1: Pre-medicate for activity, Nurse notified  · Pain Rating Post-Intervention 1: 5/10    Patients cultural, spiritual, Zoroastrian conflicts given the current situation:      Objective:     Patient found with: bed alarm     General Precautions: Standard, fall   Orthopedic Precautions:LLE weight bearing as tolerated, " LUE non weight bearing   Braces: UE Sling     Functional Mobility:  · Bed Mobility:     · Rolling Left:  moderate assistance  · Rolling Right: moderate assistance  · Supine to Sit: moderate assistance  · Sit to Supine: moderate assistance  · Transfers:     · Sit to Stand:  contact guard assistance with hemiwalker  · Bed to Chair: moderate assistance with  hemiwalker  using  Step Transfer      AM-PAC 6 CLICK MOBILITY  Turning over in bed (including adjusting bedclothes, sheets and blankets)?: 2  Sitting down on and standing up from a chair with arms (e.g., wheelchair, bedside commode, etc.): 2  Moving from lying on back to sitting on the side of the bed?: 2  Moving to and from a bed to a chair (including a wheelchair)?: 2  Need to walk in hospital room?: 2  Climbing 3-5 steps with a railing?: 1  Total Score: 11     Patient left supine with all lines intact, call button in reach, NG notified and family present..    GOALS:    Physical Therapy Goals        Problem: Physical Therapy Goal    Goal Priority Disciplines Outcome Goal Variances Interventions   Physical Therapy Goal     PT/OT, PT Ongoing (interventions implemented as appropriate)     Description:  Pt will advance recovery following  current left elbow and pelvic fractures by:  1. Assist with bed mobility leading to contact guard assist   2. Increase self help and therapeutic exercises for all extremities except left elbow  3  Progress to stand and transfer tasks with min assist   4. Iintiate walking with platform walker for left UE for short distances 25 ft/ with min assistance            Problem: Physical Therapy Goal    Goal Priority Disciplines Outcome Goal Variances Interventions   Physical Therapy Goal     PT/OT, PT      Description:  Advancing activities to accomplish anticiapted goals                    Time Tracking:     PT Received On: 05/16/18  PT Start Time: 1023     PT Stop Time: 1046  PT Total Time (min): 23 min     Billable Minutes: Therapeutic  Activity 23 minutes    Treatment Type: Treatment  PT/PTA: PT           Paige Ramirez, PT  05/16/2018

## 2018-05-16 NOTE — ASSESSMENT & PLAN NOTE
Ortho consulted via ER, non surgical orthopedic injuries per Dr Doc Wolf, non wt bearing injury to left upper ext Splinted   She also can wt bear as tolerated to left lower ext  Bo Urbina NP with ortho came this am, no changes, f/u Aminta 3 weeks for repeat films

## 2018-05-16 NOTE — PLAN OF CARE
Problem: Patient Care Overview  Goal: Plan of Care Review  Outcome: Ongoing (interventions implemented as appropriate)  Pt doing better today. PT, OT and ortho consulted. Up to chair with physical therapy. Left arm in sling. Unwrapped soft cast padding for MD to look at skin tears; will reevaluate twice weekly. Telfa dressing to skin tears, wrapped with cast padding and splint and sling reapplied. Neuro checks wnl. Vitals stable. Occasional complaints of pelvic and left arm pain; relieved with PO medication. Turning every 2 hours while in bed. Call bell within reach. Bed in low, locked position. Reviewed plan of care with pt and ; state agreement.

## 2018-05-16 NOTE — PROGRESS NOTES
[facility] Medicine  Progress Note    Patient Name: Merari Romero  MRN: [id number]  Patient Class: IP- Inpatient   Admission Date: 5/11/2018  Length of Stay: 2 days  Attending Physician: Michael Chacon MD  Primary Care Provider: Shaun Barraza MD        Subjective:     Principal Problem:Closed nondisplaced fracture of neck of left radius    HPI:  78 year old female fell going up stairs today and slipped on first step. Fell on left side. Work up in ED is pos for radial neck fracture and pelvic fractures. I am told that ortho was contacted. Pt is cleared for admit here. She will be admitted for pain control and PT.       Hospital Course:  Pain controlled with dilaudid   Sat up with PT this am  She is able to use bedpan but very painful     5/13  Pain controlled  Stood up with PT today     5/14  She did stand x 2 min with PT x 1, second attempt not successful due to pt fatigue. Her goal with PT : Iintiate walking with platform walker for left UE for short distances 25 ft/ with min assistance . She is using less morphine IV, on norco, received 2 doses with relief. C/o dribbling urine this am, not emptying bladder fully    5/15  She did not get up yesterday as she was having a lot of pain. Spoke with ortho and they rec non wt bearing to left upper ext and weight bearing as tolerated to left lower ext. Her goals are short distance of 25ft with platform walker using min assistance. Her pox is 90-99% on RA. Encouraged to use IS at bedside and continued on nebs every 12hr. She did have issues with urinary hesitancy so u/a collected which looks good and urine culture pending,. No fever. Will try and get hher up to bed side commode to see if that helps.     Still requiring intermittent IV pain meds for break though pain using norco 10mg po.     5/16 Pt was able to transfer to bedside chair yesterday with PT using mod assist. Pt. amb. 5' with vick, mod. A.  VC's given to pt. for step  placement and A.D. placement.  Pt. demonstrated decreased step height, decreased step length, decreased velocity and decreased toe-to-floor clearance. Ot also started with patient.     Bo Urbina saw patient this am. She rec cont with long arm splint to left upper ext and non wt bearing. Also WBAT to left lower ext and f/u Dr Lemos 3 weeks for repeat x rays.     She is still requiriong morphine for break through pain on top the norco 10mg.     POX remains 93-97%, encouraging IS at bedside as well as cont nebvs every 12hr. lovenox as well for DVT prophylaxis    Interval History: see HC     Review of Systems   Constitutional: Negative for chills and fever.   Respiratory: Negative for shortness of breath.    Cardiovascular: Negative for chest pain and palpitations.   Gastrointestinal: Negative for abdominal pain, blood in stool, constipation and nausea.   Genitourinary: Negative for difficulty urinating and pelvic pain.   Musculoskeletal: Positive for arthralgias and myalgias.        Left hip pain with moving as well as left arm pain with moving   Skin:        Left middle toe bruised   Psychiatric/Behavioral: Negative for dysphoric mood, sleep disturbance and suicidal ideas. The patient is not nervous/anxious.      Objective:     Vital Signs (Most Recent):  Temp: 97.2 °F (36.2 °C) (05/16/18 0705)  Pulse: 85 (05/16/18 0721)  Resp: 18 (05/16/18 0721)  BP: 136/71 (05/16/18 0705)  SpO2: 95 % (05/16/18 0721) Vital Signs (24h Range):  Temp:  [96 °F (35.6 °C)-98.4 °F (36.9 °C)] 97.2 °F (36.2 °C)  Pulse:  [79-93] 85  Resp:  [17-18] 18  SpO2:  [92 %-97 %] 95 %  BP: (112-136)/(64-82) 136/71     Weight: 64 kg (141 lb)  Body mass index is 24.98 kg/m².    Intake/Output Summary (Last 24 hours) at 05/16/18 8723  Last data filed at 05/16/18 0500   Gross per 24 hour   Intake              240 ml   Output             1150 ml   Net             -910 ml      Physical Exam   Constitutional: She is oriented to person, place, and time. She  appears well-developed and well-nourished.   HENT:   Head: Normocephalic and atraumatic.   Eyes: EOM are normal. Pupils are equal, round, and reactive to light.   Neck: Normal range of motion. Neck supple.   Cardiovascular: Normal rate, regular rhythm, normal heart sounds and intact distal pulses.    No murmur heard.  Pulmonary/Chest: Effort normal and breath sounds normal. No respiratory distress. She has no wheezes. She has no rales. She exhibits no tenderness.   Abdominal: Soft. Bowel sounds are normal. She exhibits no distension and no mass. There is no tenderness. There is no rebound and no guarding.   Musculoskeletal: Normal range of motion. She exhibits no edema or tenderness.   LUE posterior splint with ACE easily palp pulse    Left pelvic TTP foot palp pulse   Neurological: She is alert and oriented to person, place, and time. She has normal reflexes.   Skin: Skin is warm and dry. Capillary refill takes less than 2 seconds. No rash noted. No erythema. No pallor.   Psychiatric: She has a normal mood and affect. Her behavior is normal. Judgment and thought content normal.       Significant Labs:   Lab Results   Component Value Date    WBC 6.86 05/14/2018    HGB 11.6 (L) 05/14/2018    HCT 35.2 (L) 05/14/2018    MCV 89 05/14/2018     05/14/2018     BMP  Lab Results   Component Value Date     (L) 05/14/2018    K 3.9 05/14/2018    CL 98 05/14/2018    CO2 26 05/14/2018    BUN 8 05/14/2018    CREATININE 0.7 05/14/2018    CALCIUM 8.5 (L) 05/14/2018    ANIONGAP 8 05/14/2018    ESTGFRAFRICA >60 05/14/2018    EGFRNONAA >60 05/14/2018     Lab Results   Component Value Date    ALT 21 05/14/2018    AST 30 05/14/2018    ALKPHOS 83 05/14/2018    BILITOT 0.9 05/14/2018     Lab Results   Component Value Date    INR 1.1 05/11/2018     Urinalysis     Recent Labs  Lab 05/14/18  1142   COLORU Yellow   SPECGRAV 1.010   PHUR 7.0   PROTEINUA Negative   NITRITE Negative   LEUKOCYTESUR Negative   UROBILINOGEN 1.0          Significant Imaging:     Left hip x ray No definite evidence of a displaced fracture or dislocation.    The symphysis pubis is limited to evaluation due to angulation and positioning and a pubis fracture cannot be excluded..    CXR 2.3 cm confluence of shadows overlying the left upper lobe.  Consider nonurgent follow-up chest with lateral for further evaluation.    Left elbow x ray There is a fracture involving the radial neck.  Posterior fat pad is present compatible with an effusion.    Left hip x rays repeat  There are fractures involving the left symphysis pubis and the inferior ramus which were limited to evaluation on the prior study.    No evidence of a fracture of the left hip.  No dislocation.  Vascular calcifications overlying the left femoral head and neck.    Assessment/Plan:      * Closed nondisplaced fracture of neck of left radius    Ortho consulted via ER, non surgical orthopedic injuries per Dr Dco Wolf, non wt bearing injury to left upper ext Splinted   She also can wt bear as tolerated to left lower ext  Bo Urbina NP with ortho came this am, no changes, f/u Aminta 3 weeks for repeat films        Fall    Fear of falling. Cont with PT/OT          Urinary hesitancy    Check U/ a and culture- all normal            Bed confinement status    Ordering PT and nebs q 12 to reduce risk of atelectasis  Incentive spirometer >> encouraged use  Add OT today as she was living home with  prior to fall and able to take care of self and him  Consult inpt rehab to see if she would qualify as this is patients request she was declined as she can not tolerate 3hr. She is progressing with PT but slow. Will ask for SWING today        History of deep venous thrombosis    lovenox 40 daily due to hx of DVT and immobility          Closed fracture of left pubis    Non operative   Pain control try and cont po as much as poss  Try and reduce risk of associated complications of limited mobility(IS, nebs,  PT)  Discussed with Bo GONZALEZ ortho. Seen patient today, cont non wt bearing to left upper ext with long arm splint and wt bearing as tolerated to left lower ext. F/u Dr Lemos 3 weeks for repeat films          Neuropathy of lower extremity    Continue low dose amitriptyline and cymbalta          HTN (hypertension)    Continue metoprol and norvasc  Bp 112//69            VTE Risk Mitigation         Ordered     enoxaparin injection 40 mg  Daily      05/11/18 1848     IP VTE HIGH RISK PATIENT  Once      05/11/18 1807     Place sequential compression device  Until discontinued      05/11/18 1807              Michael Chacon MD  Department of Hospital Medicine   [facility]

## 2018-05-17 ENCOUNTER — HOSPITAL ENCOUNTER (INPATIENT)
Facility: HOSPITAL | Age: 78
LOS: 12 days | Discharge: HOME HEALTH | DRG: 563 | End: 2018-05-29
Attending: FAMILY MEDICINE | Admitting: FAMILY MEDICINE
Payer: MEDICARE

## 2018-05-17 VITALS
OXYGEN SATURATION: 93 % | DIASTOLIC BLOOD PRESSURE: 59 MMHG | HEART RATE: 91 BPM | SYSTOLIC BLOOD PRESSURE: 108 MMHG | BODY MASS INDEX: 24.98 KG/M2 | WEIGHT: 141 LBS | HEIGHT: 63 IN | TEMPERATURE: 96 F | RESPIRATION RATE: 18 BRPM
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
## 2018-05-17 DIAGNOSIS — S52.135D CLOSED NONDISPLACED FRACTURE OF NECK OF LEFT RADIUS WITH ROUTINE HEALING, SUBSEQUENT ENCOUNTER: Primary | ICD-10-CM

## 2018-05-17 DIAGNOSIS — S52.135A CLOSED NONDISPLACED FRACTURE OF NECK OF LEFT RADIUS, INITIAL ENCOUNTER: ICD-10-CM

## 2018-05-17 DIAGNOSIS — R53.81 DEBILITY: ICD-10-CM

## 2018-05-17 DIAGNOSIS — S32.502A CLOSED FRACTURE OF LEFT PUBIS, UNSPECIFIED PORTION OF PUBIS, INITIAL ENCOUNTER: ICD-10-CM
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
## 2018-05-17 LAB
BACTERIA UR CULT: NO GROWTH
BASOPHILS # BLD AUTO: 0.03 K/UL
BASOPHILS NFR BLD: 0.4 %
CREAT SERPL-MCNC: 0.7 MG/DL
DIFFERENTIAL METHOD: ABNORMAL
EOSINOPHIL # BLD AUTO: 0.4 K/UL
EOSINOPHIL NFR BLD: 4.6 %
ERYTHROCYTE [DISTWIDTH] IN BLOOD BY AUTOMATED COUNT: 13.7 %
EST. GFR  (AFRICAN AMERICAN): >60 ML/MIN/1.73 M^2
EST. GFR  (NON AFRICAN AMERICAN): >60 ML/MIN/1.73 M^2
HCT VFR BLD AUTO: 35.2 %
HGB BLD-MCNC: 11.7 G/DL
LYMPHOCYTES # BLD AUTO: 1.3 K/UL
LYMPHOCYTES NFR BLD: 16.5 %
MCH RBC QN AUTO: 29.3 PG
MCHC RBC AUTO-ENTMCNC: 33.2 G/DL
MCV RBC AUTO: 88 FL
MONOCYTES # BLD AUTO: 0.7 K/UL
MONOCYTES NFR BLD: 8.7 %
NEUTROPHILS # BLD AUTO: 5.6 K/UL
NEUTROPHILS NFR BLD: 69.8 %
PLATELET # BLD AUTO: 229 K/UL
PMV BLD AUTO: 10.6 FL
RBC # BLD AUTO: 3.99 M/UL
WBC # BLD AUTO: 8.02 K/UL

## 2018-05-17 PROCEDURE — G8987 SELF CARE CURRENT STATUS: HCPCS | Mod: CL

## 2018-05-17 PROCEDURE — 63600175 PHARM REV CODE 636 W HCPCS: Performed by: FAMILY MEDICINE

## 2018-05-17 PROCEDURE — 25000003 PHARM REV CODE 250: Performed by: SURGERY

## 2018-05-17 PROCEDURE — 11000004 HC SNF PRIVATE

## 2018-05-17 PROCEDURE — 36415 COLL VENOUS BLD VENIPUNCTURE: CPT

## 2018-05-17 PROCEDURE — 94640 AIRWAY INHALATION TREATMENT: CPT

## 2018-05-17 PROCEDURE — 85025 COMPLETE CBC W/AUTO DIFF WBC: CPT

## 2018-05-17 PROCEDURE — 94761 N-INVAS EAR/PLS OXIMETRY MLT: CPT

## 2018-05-17 PROCEDURE — G8981 BODY POS CURRENT STATUS: HCPCS | Mod: CL

## 2018-05-17 PROCEDURE — G8988 SELF CARE GOAL STATUS: HCPCS | Mod: CK

## 2018-05-17 PROCEDURE — 25000003 PHARM REV CODE 250: Performed by: NURSE PRACTITIONER

## 2018-05-17 PROCEDURE — 99239 HOSP IP/OBS DSCHRG MGMT >30: CPT | Mod: ,,, | Performed by: FAMILY MEDICINE

## 2018-05-17 PROCEDURE — 97116 GAIT TRAINING THERAPY: CPT

## 2018-05-17 PROCEDURE — 25000003 PHARM REV CODE 250: Performed by: FAMILY MEDICINE

## 2018-05-17 PROCEDURE — 25000242 PHARM REV CODE 250 ALT 637 W/ HCPCS: Performed by: FAMILY MEDICINE

## 2018-05-17 PROCEDURE — 97530 THERAPEUTIC ACTIVITIES: CPT

## 2018-05-17 PROCEDURE — 97165 OT EVAL LOW COMPLEX 30 MIN: CPT

## 2018-05-17 PROCEDURE — G8980 MOBILITY D/C STATUS: HCPCS | Mod: CL

## 2018-05-17 PROCEDURE — 94760 N-INVAS EAR/PLS OXIMETRY 1: CPT

## 2018-05-17 PROCEDURE — 82565 ASSAY OF CREATININE: CPT

## 2018-05-17 PROCEDURE — G8979 MOBILITY GOAL STATUS: HCPCS | Mod: CL

## 2018-05-17 PROCEDURE — 25000242 PHARM REV CODE 250 ALT 637 W/ HCPCS: Performed by: NURSE PRACTITIONER

## 2018-05-17 PROCEDURE — 97162 PT EVAL MOD COMPLEX 30 MIN: CPT

## 2018-05-17 PROCEDURE — G8982 BODY POS GOAL STATUS: HCPCS | Mod: CK

## 2018-05-17 PROCEDURE — 63600175 PHARM REV CODE 636 W HCPCS: Performed by: NURSE PRACTITIONER

## 2018-05-17 RX ORDER — ACETAMINOPHEN 325 MG/1
650 TABLET ORAL EVERY 8 HOURS PRN
Status: DISCONTINUED | OUTPATIENT
Start: 2018-05-17 | End: 2018-05-29 | Stop reason: HOSPADM

## 2018-05-17 RX ORDER — AMITRIPTYLINE HYDROCHLORIDE 25 MG/1
25 TABLET, FILM COATED ORAL NIGHTLY
Status: DISCONTINUED | OUTPATIENT
Start: 2018-05-17 | End: 2018-05-29 | Stop reason: HOSPADM

## 2018-05-17 RX ORDER — MORPHINE SULFATE 4 MG/ML
2 INJECTION, SOLUTION INTRAMUSCULAR; INTRAVENOUS EVERY 4 HOURS PRN
Status: DISCONTINUED | OUTPATIENT
Start: 2018-05-17 | End: 2018-05-29 | Stop reason: HOSPADM

## 2018-05-17 RX ORDER — HYDROCODONE BITARTRATE AND ACETAMINOPHEN 5; 325 MG/1; MG/1
1 TABLET ORAL EVERY 4 HOURS PRN
Status: DISCONTINUED | OUTPATIENT
Start: 2018-05-17 | End: 2018-05-29 | Stop reason: HOSPADM

## 2018-05-17 RX ORDER — AMLODIPINE BESYLATE 5 MG/1
5 TABLET ORAL DAILY
Status: DISCONTINUED | OUTPATIENT
Start: 2018-05-18 | End: 2018-05-29 | Stop reason: HOSPADM

## 2018-05-17 RX ORDER — ATORVASTATIN CALCIUM 20 MG/1
20 TABLET, FILM COATED ORAL DAILY
Status: DISCONTINUED | OUTPATIENT
Start: 2018-05-18 | End: 2018-05-29 | Stop reason: HOSPADM

## 2018-05-17 RX ORDER — DOCUSATE SODIUM 100 MG/1
100 CAPSULE, LIQUID FILLED ORAL 2 TIMES DAILY
Status: DISCONTINUED | OUTPATIENT
Start: 2018-05-17 | End: 2018-05-29 | Stop reason: HOSPADM

## 2018-05-17 RX ORDER — ENOXAPARIN SODIUM 100 MG/ML
40 INJECTION SUBCUTANEOUS EVERY 24 HOURS
Status: CANCELLED | OUTPATIENT
Start: 2018-05-17

## 2018-05-17 RX ORDER — MORPHINE SULFATE 4 MG/ML
2 INJECTION, SOLUTION INTRAMUSCULAR; INTRAVENOUS EVERY 4 HOURS PRN
Status: CANCELLED | OUTPATIENT
Start: 2018-05-17

## 2018-05-17 RX ORDER — DOCUSATE SODIUM 100 MG/1
100 CAPSULE, LIQUID FILLED ORAL 2 TIMES DAILY
Status: CANCELLED | OUTPATIENT
Start: 2018-05-17

## 2018-05-17 RX ORDER — METOPROLOL SUCCINATE 50 MG/1
50 TABLET, EXTENDED RELEASE ORAL DAILY
Status: CANCELLED | OUTPATIENT
Start: 2018-05-18

## 2018-05-17 RX ORDER — IPRATROPIUM BROMIDE AND ALBUTEROL SULFATE 2.5; .5 MG/3ML; MG/3ML
3 SOLUTION RESPIRATORY (INHALATION) EVERY 12 HOURS
Status: CANCELLED | OUTPATIENT
Start: 2018-05-17

## 2018-05-17 RX ORDER — AMLODIPINE BESYLATE 5 MG/1
5 TABLET ORAL DAILY
Status: CANCELLED | OUTPATIENT
Start: 2018-05-18

## 2018-05-17 RX ORDER — ACETAMINOPHEN 325 MG/1
650 TABLET ORAL EVERY 8 HOURS PRN
Status: CANCELLED | OUTPATIENT
Start: 2018-05-17

## 2018-05-17 RX ORDER — ATORVASTATIN CALCIUM 20 MG/1
20 TABLET, FILM COATED ORAL DAILY
Status: CANCELLED | OUTPATIENT
Start: 2018-05-18

## 2018-05-17 RX ORDER — HYDROCODONE BITARTRATE AND ACETAMINOPHEN 5; 325 MG/1; MG/1
1 TABLET ORAL EVERY 4 HOURS PRN
Status: CANCELLED | OUTPATIENT
Start: 2018-05-17

## 2018-05-17 RX ORDER — ENOXAPARIN SODIUM 100 MG/ML
40 INJECTION SUBCUTANEOUS EVERY 24 HOURS
Status: DISCONTINUED | OUTPATIENT
Start: 2018-05-17 | End: 2018-05-29 | Stop reason: HOSPADM

## 2018-05-17 RX ORDER — DULOXETIN HYDROCHLORIDE 30 MG/1
30 CAPSULE, DELAYED RELEASE ORAL DAILY
Status: CANCELLED | OUTPATIENT
Start: 2018-05-18

## 2018-05-17 RX ORDER — IPRATROPIUM BROMIDE AND ALBUTEROL SULFATE 2.5; .5 MG/3ML; MG/3ML
3 SOLUTION RESPIRATORY (INHALATION) EVERY 12 HOURS
Status: DISCONTINUED | OUTPATIENT
Start: 2018-05-17 | End: 2018-05-29 | Stop reason: HOSPADM

## 2018-05-17 RX ORDER — METOPROLOL SUCCINATE 50 MG/1
50 TABLET, EXTENDED RELEASE ORAL DAILY
Status: DISCONTINUED | OUTPATIENT
Start: 2018-05-18 | End: 2018-05-29 | Stop reason: HOSPADM

## 2018-05-17 RX ORDER — ONDANSETRON 2 MG/ML
4 INJECTION INTRAMUSCULAR; INTRAVENOUS EVERY 8 HOURS PRN
Status: CANCELLED | OUTPATIENT
Start: 2018-05-17

## 2018-05-17 RX ORDER — PANTOPRAZOLE SODIUM 40 MG/1
40 TABLET, DELAYED RELEASE ORAL DAILY
Status: DISCONTINUED | OUTPATIENT
Start: 2018-05-18 | End: 2018-05-29 | Stop reason: HOSPADM

## 2018-05-17 RX ORDER — PANTOPRAZOLE SODIUM 40 MG/1
40 TABLET, DELAYED RELEASE ORAL DAILY
Status: CANCELLED | OUTPATIENT
Start: 2018-05-18

## 2018-05-17 RX ORDER — AMITRIPTYLINE HYDROCHLORIDE 25 MG/1
25 TABLET, FILM COATED ORAL NIGHTLY
Status: CANCELLED | OUTPATIENT
Start: 2018-05-17

## 2018-05-17 RX ORDER — ONDANSETRON 2 MG/ML
4 INJECTION INTRAMUSCULAR; INTRAVENOUS EVERY 8 HOURS PRN
Status: DISCONTINUED | OUTPATIENT
Start: 2018-05-17 | End: 2018-05-29 | Stop reason: HOSPADM

## 2018-05-17 RX ORDER — DULOXETIN HYDROCHLORIDE 30 MG/1
30 CAPSULE, DELAYED RELEASE ORAL DAILY
Status: DISCONTINUED | OUTPATIENT
Start: 2018-05-18 | End: 2018-05-29 | Stop reason: HOSPADM

## 2018-05-17 RX ADMIN — ENOXAPARIN SODIUM 40 MG: 100 INJECTION SUBCUTANEOUS at 04:05

## 2018-05-17 RX ADMIN — HYDROCODONE BITARTRATE AND ACETAMINOPHEN 1 TABLET: 5; 325 TABLET ORAL at 04:05

## 2018-05-17 RX ADMIN — IPRATROPIUM BROMIDE AND ALBUTEROL SULFATE 3 ML: 2.5; .5 SOLUTION RESPIRATORY (INHALATION) at 06:05

## 2018-05-17 RX ADMIN — AMLODIPINE BESYLATE 5 MG: 5 TABLET ORAL at 09:05

## 2018-05-17 RX ADMIN — ATORVASTATIN CALCIUM 20 MG: 20 TABLET, FILM COATED ORAL at 09:05

## 2018-05-17 RX ADMIN — DOCUSATE SODIUM 100 MG: 100 CAPSULE, LIQUID FILLED ORAL at 09:05

## 2018-05-17 RX ADMIN — MORPHINE SULFATE 2 MG: 4 INJECTION INTRAVENOUS at 11:05

## 2018-05-17 RX ADMIN — PANTOPRAZOLE SODIUM 40 MG: 40 TABLET, DELAYED RELEASE ORAL at 09:05

## 2018-05-17 RX ADMIN — IPRATROPIUM BROMIDE AND ALBUTEROL SULFATE 3 ML: 2.5; .5 SOLUTION RESPIRATORY (INHALATION) at 07:05

## 2018-05-17 RX ADMIN — AMITRIPTYLINE HYDROCHLORIDE 25 MG: 25 TABLET, FILM COATED ORAL at 09:05

## 2018-05-17 RX ADMIN — HYDROCODONE BITARTRATE AND ACETAMINOPHEN 1 TABLET: 5; 325 TABLET ORAL at 09:05

## 2018-05-17 RX ADMIN — HYDROCODONE BITARTRATE AND ACETAMINOPHEN 1 TABLET: 5; 325 TABLET ORAL at 05:05

## 2018-05-17 RX ADMIN — METOPROLOL SUCCINATE 50 MG: 50 TABLET, EXTENDED RELEASE ORAL at 09:05

## 2018-05-17 RX ADMIN — DULOXETINE 30 MG: 30 CAPSULE, DELAYED RELEASE ORAL at 09:05

## 2018-05-17 RX ADMIN — HYDROCODONE BITARTRATE AND ACETAMINOPHEN 1 TABLET: 5; 325 TABLET ORAL at 12:05

## 2018-05-17 NOTE — ASSESSMENT & PLAN NOTE
Ordering PT and nebs q 12 to reduce risk of atelectasis  Incentive spirometer >> encouraged use up to 1000 yesterday  Addde OT as she was living home with  prior to fall and able to take care of self and him  Consult inpt rehab to see if she would qualify as this is patients request she was declined as she can not tolerate 3hr. She is progressing with PT but slow.  SWING placement today

## 2018-05-17 NOTE — PLAN OF CARE
Problem: Patient Care Overview  Goal: Plan of Care Review  Outcome: Ongoing (interventions implemented as appropriate)  Pt admitted to swing today for continued physical and occupational therapy. Ambulating with moderate assist and use of flora walker. Left arm remains in splint and sling. Neuro checks wnl. Pain controlled with PO medication and IV occasionally for breakthrough. Vitals stable. Turning every 2 hours while in bed. Call bell within reach. Reviewed plan of care with pt and ; state agreement.

## 2018-05-17 NOTE — PROGRESS NOTES
[facility] Medicine  Progress Note    Patient Name: Merari Romero  MRN: [id number]  Patient Class: IP- Inpatient   Admission Date: 5/11/2018  Length of Stay: 3 days  Attending Physician: Michael Chacon MD  Primary Care Provider: Shaun Barraza MD        Subjective:     Principal Problem:Closed nondisplaced fracture of neck of left radius    HPI:  78 year old female fell going up stairs today and slipped on first step. Fell on left side. Work up in ED is pos for radial neck fracture and pelvic fractures. I am told that ortho was contacted. Pt is cleared for admit here. She will be admitted for pain control and PT.       Hospital Course:  Pain controlled with dilaudid   Sat up with PT this am  She is able to use bedpan but very painful     5/13  Pain controlled  Stood up with PT today     5/14  She did stand x 2 min with PT x 1, second attempt not successful due to pt fatigue. Her goal with PT : Iintiate walking with platform walker for left UE for short distances 25 ft/ with min assistance . She is using less morphine IV, on norco, received 2 doses with relief. C/o dribbling urine this am, not emptying bladder fully    5/15  She did not get up yesterday as she was having a lot of pain. Spoke with ortho and they rec non wt bearing to left upper ext and weight bearing as tolerated to left lower ext. Her goals are short distance of 25ft with platform walker using min assistance. Her pox is 90-99% on RA. Encouraged to use IS at bedside and continued on nebs every 12hr. She did have issues with urinary hesitancy so u/a collected which looks good and urine culture pending,. No fever. Will try and get hher up to bed side commode to see if that helps.     Still requiring intermittent IV pain meds for break though pain using norco 10mg po.     5/16 Pt was able to transfer to bedside chair yesterday with PT using mod assist. Pt. amb. 5' with vick, mod. A.  VC's given to pt. for step  placement and A.D. placement.  Pt. demonstrated decreased step height, decreased step length, decreased velocity and decreased toe-to-floor clearance. Ot also started with patient.     Bo Urbina saw patient this am. She rec cont with long arm splint to left upper ext and non wt bearing. Also WBAT to left lower ext and f/u Dr Lemos 3 weeks for repeat x rays.     She is still requiriong morphine for break through pain on top the norco 10mg.     POX remains 93-97%, encouraging IS at bedside as well as cont nebvs every 12hr. lovenox as well for DVT prophylaxis    5/17  Pt remains doing well. She actually ambulated 10 ft yesterday with flora walker and mod assist per PT. She requires min assist with bed mobility and transfers. This is much progress since just yesterday when she was mod assist for all movement and was not ambulating just transfering. Only required morphine once last night otherwise able to tolerate pain with po norco as needed (morphine x 1). VVS/afebrile. POX 92-94% on RA, IS at bedside and again encouraged use. Getting better with IS. Nebs BID. lovenox for DVT prophylaxis    This am c/o nausea.  Took a norco this am at 5 without  Eating.     Interval History: see HC     Review of Systems   Constitutional: Negative for chills and fever.   Respiratory: Negative for shortness of breath.    Cardiovascular: Negative for chest pain and palpitations.   Gastrointestinal: Positive for nausea. Negative for abdominal pain, blood in stool and constipation.   Genitourinary: Negative for difficulty urinating and pelvic pain.   Musculoskeletal: Positive for arthralgias and myalgias.        Left hip pain with moving as well as left arm pain with moving   Skin:        Left middle toe bruised   Psychiatric/Behavioral: Negative for dysphoric mood, sleep disturbance and suicidal ideas. The patient is not nervous/anxious.      Objective:     Vital Signs (Most Recent):  Temp: 97.2 °F (36.2 °C) (05/17/18 0707)  Pulse: 80  (05/17/18 0721)  Resp: 16 (05/17/18 0721)  BP: 124/69 (05/17/18 0707)  SpO2: (!) 93 % (05/17/18 0721) Vital Signs (24h Range):  Temp:  [96.4 °F (35.8 °C)-97.2 °F (36.2 °C)] 97.2 °F (36.2 °C)  Pulse:  [73-88] 80  Resp:  [16-18] 16  SpO2:  [92 %-94 %] 93 %  BP: (111-135)/(58-74) 124/69     Weight: 64 kg (141 lb)  Body mass index is 24.98 kg/m².    Intake/Output Summary (Last 24 hours) at 05/17/18 1027  Last data filed at 05/17/18 0500   Gross per 24 hour   Intake                0 ml   Output             1800 ml   Net            -1800 ml      Physical Exam   Constitutional: She is oriented to person, place, and time. She appears well-developed and well-nourished.   Sitting up at bedside   HENT:   Head: Normocephalic and atraumatic.   Eyes: EOM are normal. Pupils are equal, round, and reactive to light.   Neck: Normal range of motion. Neck supple.   Cardiovascular: Normal rate, regular rhythm, normal heart sounds and intact distal pulses.    No murmur heard.  Pulmonary/Chest: Effort normal and breath sounds normal. No respiratory distress. She has no wheezes. She has no rales. She exhibits no tenderness.   Abdominal: Soft. Bowel sounds are normal. She exhibits no distension and no mass. There is no tenderness. There is no rebound and no guarding.   Musculoskeletal: Normal range of motion. She exhibits no edema or tenderness.   LUE posterior splint with ACE easily palp pulse    Left pelvic TTP foot palp pulse   Neurological: She is alert and oriented to person, place, and time. She has normal reflexes.   Skin: Skin is warm and dry. Capillary refill takes less than 2 seconds. No rash noted. No erythema. No pallor.   Psychiatric: She has a normal mood and affect. Her behavior is normal. Judgment and thought content normal.       Significant Labs:   Lab Results   Component Value Date    WBC 6.86 05/14/2018    HGB 11.6 (L) 05/14/2018    HCT 35.2 (L) 05/14/2018    MCV 89 05/14/2018     05/14/2018     BMP  Lab Results    Component Value Date     (L) 05/14/2018    K 3.9 05/14/2018    CL 98 05/14/2018    CO2 26 05/14/2018    BUN 8 05/14/2018    CREATININE 0.7 05/14/2018    CALCIUM 8.5 (L) 05/14/2018    ANIONGAP 8 05/14/2018    ESTGFRAFRICA >60 05/14/2018    EGFRNONAA >60 05/14/2018     Lab Results   Component Value Date    ALT 21 05/14/2018    AST 30 05/14/2018    ALKPHOS 83 05/14/2018    BILITOT 0.9 05/14/2018     Lab Results   Component Value Date    INR 1.1 05/11/2018     Urinalysis     Recent Labs  Lab 05/14/18  1142   COLORU Yellow   SPECGRAV 1.010   PHUR 7.0   PROTEINUA Negative   NITRITE Negative   LEUKOCYTESUR Negative   UROBILINOGEN 1.0         Significant Imaging:     Left hip x ray No definite evidence of a displaced fracture or dislocation.    The symphysis pubis is limited to evaluation due to angulation and positioning and a pubis fracture cannot be excluded..    CXR 2.3 cm confluence of shadows overlying the left upper lobe.  Consider nonurgent follow-up chest with lateral for further evaluation.    Left elbow x ray There is a fracture involving the radial neck.  Posterior fat pad is present compatible with an effusion.    Left hip x rays repeat  There are fractures involving the left symphysis pubis and the inferior ramus which were limited to evaluation on the prior study.    No evidence of a fracture of the left hip.  No dislocation.  Vascular calcifications overlying the left femoral head and neck.    Assessment/Plan:      * Closed nondisplaced fracture of neck of left radius    Ortho consulted via ER, non surgical orthopedic injuries per Dr Doc Wolf, non wt bearing injury to left upper ext Splinted   She also can wt bear as tolerated to left lower ext   Bo Urbina NP with ortho came 5/16, no changes, f/u Aminta 3 weeks for repeat films  Undressed splint yesterday to assess skin tears, healing well, no s/s infection. Will undress biweekly  SWING today        Fall    Fear of falling. Cont with PT/OT           Urinary hesitancy    Check U/ a and culture- all normal            Bed confinement status    Ordering PT and nebs q 12 to reduce risk of atelectasis  Incentive spirometer >> encouraged use up to 1000 yesterday  Addde OT as she was living home with  prior to fall and able to take care of self and him  Consult inpt rehab to see if she would qualify as this is patients request she was declined as she can not tolerate 3hr. She is progressing with PT but slow.  SWING placement today        History of deep venous thrombosis    lovenox 40 daily due to hx of DVT and immobility          Closed fracture of left pubis    Non operative   Pain control try and cont po as much as poss  Try and reduce risk of associated complications of limited mobility(IS, nebs, PT)  Discussed with Bo GONZALEZ ortho. Seen patient 5/16, cont non wt bearing to left upper ext with long arm splint and wt bearing as tolerated to left lower ext. F/u Dr Lemos 3 weeks for repeat films  SWING today          Neuropathy of lower extremity    Continue low dose amitriptyline and cymbalta          HTN (hypertension)    Continue metoprol and norvasc  Bp 111//67            VTE Risk Mitigation         Ordered     enoxaparin injection 40 mg  Daily      05/11/18 1848     IP VTE HIGH RISK PATIENT  Once      05/11/18 1807     Place sequential compression device  Until discontinued      05/11/18 1807              Camille Gauthier MD  Department of Hospital Medicine   [facility]

## 2018-05-17 NOTE — SUBJECTIVE & OBJECTIVE
Interval History: see      Review of Systems   Constitutional: Negative for chills and fever.   Respiratory: Negative for shortness of breath.    Cardiovascular: Negative for chest pain and palpitations.   Gastrointestinal: Positive for nausea. Negative for abdominal pain, blood in stool and constipation.   Genitourinary: Negative for difficulty urinating and pelvic pain.   Musculoskeletal: Positive for arthralgias and myalgias.        Left hip pain with moving as well as left arm pain with moving   Skin:        Left middle toe bruised   Psychiatric/Behavioral: Negative for dysphoric mood, sleep disturbance and suicidal ideas. The patient is not nervous/anxious.      Objective:     Vital Signs (Most Recent):  Temp: 97.2 °F (36.2 °C) (05/17/18 0707)  Pulse: 80 (05/17/18 0721)  Resp: 16 (05/17/18 0721)  BP: 124/69 (05/17/18 0707)  SpO2: (!) 93 % (05/17/18 0721) Vital Signs (24h Range):  Temp:  [96.4 °F (35.8 °C)-97.2 °F (36.2 °C)] 97.2 °F (36.2 °C)  Pulse:  [73-88] 80  Resp:  [16-18] 16  SpO2:  [92 %-94 %] 93 %  BP: (111-135)/(58-74) 124/69     Weight: 64 kg (141 lb)  Body mass index is 24.98 kg/m².    Intake/Output Summary (Last 24 hours) at 05/17/18 1027  Last data filed at 05/17/18 0500   Gross per 24 hour   Intake                0 ml   Output             1800 ml   Net            -1800 ml      Physical Exam   Constitutional: She is oriented to person, place, and time. She appears well-developed and well-nourished.   Sitting up at bedside   HENT:   Head: Normocephalic and atraumatic.   Eyes: EOM are normal. Pupils are equal, round, and reactive to light.   Neck: Normal range of motion. Neck supple.   Cardiovascular: Normal rate, regular rhythm, normal heart sounds and intact distal pulses.    No murmur heard.  Pulmonary/Chest: Effort normal and breath sounds normal. No respiratory distress. She has no wheezes. She has no rales. She exhibits no tenderness.   Abdominal: Soft. Bowel sounds are normal. She exhibits no  distension and no mass. There is no tenderness. There is no rebound and no guarding.   Musculoskeletal: Normal range of motion. She exhibits no edema or tenderness.   LUE posterior splint with ACE easily palp pulse    Left pelvic TTP foot palp pulse   Neurological: She is alert and oriented to person, place, and time. She has normal reflexes.   Skin: Skin is warm and dry. Capillary refill takes less than 2 seconds. No rash noted. No erythema. No pallor.   Psychiatric: She has a normal mood and affect. Her behavior is normal. Judgment and thought content normal.       Significant Labs:   Lab Results   Component Value Date    WBC 6.86 05/14/2018    HGB 11.6 (L) 05/14/2018    HCT 35.2 (L) 05/14/2018    MCV 89 05/14/2018     05/14/2018     BMP  Lab Results   Component Value Date     (L) 05/14/2018    K 3.9 05/14/2018    CL 98 05/14/2018    CO2 26 05/14/2018    BUN 8 05/14/2018    CREATININE 0.7 05/14/2018    CALCIUM 8.5 (L) 05/14/2018    ANIONGAP 8 05/14/2018    ESTGFRAFRICA >60 05/14/2018    EGFRNONAA >60 05/14/2018     Lab Results   Component Value Date    ALT 21 05/14/2018    AST 30 05/14/2018    ALKPHOS 83 05/14/2018    BILITOT 0.9 05/14/2018     Lab Results   Component Value Date    INR 1.1 05/11/2018     Urinalysis     Recent Labs  Lab 05/14/18  1142   COLORU Yellow   SPECGRAV 1.010   PHUR 7.0   PROTEINUA Negative   NITRITE Negative   LEUKOCYTESUR Negative   UROBILINOGEN 1.0         Significant Imaging:     Left hip x ray No definite evidence of a displaced fracture or dislocation.    The symphysis pubis is limited to evaluation due to angulation and positioning and a pubis fracture cannot be excluded..    CXR 2.3 cm confluence of shadows overlying the left upper lobe.  Consider nonurgent follow-up chest with lateral for further evaluation.    Left elbow x ray There is a fracture involving the radial neck.  Posterior fat pad is present compatible with an effusion.    Left hip x rays repeat  There are  fractures involving the left symphysis pubis and the inferior ramus which were limited to evaluation on the prior study.    No evidence of a fracture of the left hip.  No dislocation.  Vascular calcifications overlying the left femoral head and neck.

## 2018-05-17 NOTE — PT/OT/SLP EVAL
"Physical Therapy Evaluation    Patient Name:  Merari Romero   MRN:  [id number]    Recommendations:     Discharge Recommendations:  home with home health, home health PT, rehabilitation facility   Discharge Equipment Recommendations: other (see comments) (pending)   Barriers to discharge: None    Assessment:     Merari Romero is a 78 y.o. female admitted with a medical diagnosis of <principal problem not specified>.  She presents with the following impairments/functional limitations:  weakness, gait instability, impaired balance, impaired endurance, decreased lower extremity function, impaired self care skills, impaired functional mobilty, decreased upper extremity function .  Pt. Is a fall risk secondary to decreased strength and mobility deficits.  Pt. would benefit from PT to achieve stated goals to increase independence with ADL's.    Rehab Prognosis:  Fair+; patient would benefit from acute skilled PT services to address these deficits and reach maximum level of function.      Recent Surgery: * No surgery found *      Plan:     During this hospitalization, patient to be seen  (1-2x/day(M-F); PRN(Sat.,Sun.)) to address the above listed problems via therapeutic activities, therapeutic exercises, gait training  · Plan of Care Expires:   (Upon D/C from facility)   Plan of Care Reviewed with: patient, spouse, grandchild(trina)    Subjective     Communicated with patient prior to session.  Patient found seated in W/C upon PT entry to room, agreeable to evaluation.      Chief Complaint: Fatigue  Patient comments/goals: "I'll try to walk."  Pain/Comfort:  · Pain Rating 1: 5/10  · Location - Side 1: Left  · Location - Orientation 1: generalized  · Location 1: hip  · Pain Addressed 1: Nurse notified, Distraction, Reposition  · Pain Rating Post-Intervention 1: 5/10    Patients cultural, spiritual, Holiness conflicts given the current situation: none verbalized    Living Environment:  Pt. lives at home with spouse.  Prior to " admission, patients level of function was independent.  Patient has the following equipment: none.  DME owned (not currently used): none.  Upon discharge, patient will have assistance from family.    Objective:     Patient found with:       General Precautions: Standard, fall   Orthopedic Precautions:LUE non weight bearing, LLE weight bearing as tolerated   Braces: UE Sling     Exams:  · Cognitive Exam:  Patient is oriented to Person, Place, Time and Situation and follows 100% of verbal commands   · Gross Motor Coordination:  WFL  · Skin Integrity/Edema:      · -       Skin integrity: Visible skin intact  · RLE ROM: WFL  · RLE Strength: Deficits: Grossly 4/5  · LLE ROM: Deficits: Hip Flexion: 80 deg.  Hip Abd.:20 deg.  Knee Flexion: 95 deg.  Knee Ext.: 0 deg.  Ankle DF: 5 deg.  Ankle PF: 30 deg.  · LLE Strength: Deficits: Grossly 3/5    Functional Mobility:  · Bed Mobility:     · Rolling Left:  minimum assistance  · Rolling Right: minimum assistance  · Scooting: minimum assistance  · Bridging: minimum assistance  · Supine to Sit: minimum assistance  · Sit to Supine: minimum assistance  · Transfers:     · Sit to Stand:  minimum assistance with hemiwalker  · Bed to Chair: minimum assistance with  hemiwalker  using  Step Transfer  · Gait: ·Gait: Gait Training:  Pt. amb. 10' with hemiwalker, mod. A.  VC's given to pt. for step placement and A.D. placement.  Pt. demonstrated decreased step height, decreased step length, decreased velocity and decreased toe-to-floor clearance  Balance:   Static Sit: FAIR+: Able to take MINIMAL challenges from all directions  Dynamic Sit: FAIR+: Maintains balance through MINIMAL excursions of active trunk motion  Static Stand: POOR+: Needs MINIMAL assist to maintain  · Dynamic stand: POOR: N/A  ·     AM-PAC 6 CLICK MOBILITY  Total Score:11       Patient left HOB elevated with all lines intact, call button in reach, RN notified and spouse present.    GOALS:    Physical Therapy Goals         Problem: Physical Therapy Goal    Goal Priority Disciplines Outcome Goal Variances Interventions   Physical Therapy Goal     PT/OT, PT      Description:  Goals to be met by: 2018     Patient will increase functional independence with mobility by performin. Supine to sit with Stand-by Assistance  2. Sit to supine with Stand-by Assistance  3. Rolling to Left and Right with Stand-by Assistance.  4. Sit to stand transfer with Stand-by Assistance, using bronson-walker  5. Bed to chair transfer with Stand-by Assistance using Bronson-walker  6. Gait  x 50 feet with Stand-by Assistance using Bronson-walker.                       History:     Past Medical History:   Diagnosis Date    DVT (deep venous thrombosis)     left leg    Hypertension     Neuropathy     Osteoporosis        Past Surgical History:   Procedure Laterality Date    FEMUR SURGERY      repair - left leg    HYSTERECTOMY  's    JOINT REPLACEMENT      left knee    NASAL FRACTURE SURGERY         Clinical Decision Making:     History  Co-morbidities and personal factors that may impact the plan of care Examination  Body Structures and Functions, activity limitations and participation restrictions that may impact the plan of care Clinical Presentation   Decision Making/ Complexity Score   Co-morbidities:   [x] Time since onset of injury / illness / exacerbation  [] Status of current condition  []Patient's cognitive status and safety concerns    [x] Multiple Medical Problems (see med hx)  Personal Factors:   [] Patient's age  [] Prior Level of function   [] Patient's home situation (environment and family support)  [] Patient's level of motivation  [] Expected progression of patient      HISTORY:(criteria)    [] 41567 - no personal factors/history    [x] 62732 - has 1-2 personal factor/comorbidity     [] 05456 - has >3 personal factor/comorbidity     Body Regions:  [] Objective examination findings  [] Head     []  Neck  [] Trunk   [] Upper  Extremity  [] Lower Extremity    Body Systems:  [] For communication ability, affect, cognition, language, and learning style: the assessment of the ability to make needs known, consciousness, orientation (person, place, and time), expected emotional /behavioral responses, and learning preferences (eg, learning barriers, education  needs)  [x] For the neuromuscular system: a general assessment of gross coordinated movement (eg, balance, gait, locomotion, transfers, and transitions) and motor function  (motor control and motor learning)  [x] For the musculoskeletal system: the assessment of gross symmetry, gross range of motion, gross strength, height, and weight  [] For the integumentary system: the assessment of pliability(texture), presence of scar formation, skin color, and skin integrity  [] For cardiovascular/pulmonary system: the assessment of heart rate, respiratory rate, blood pressure, and edema     Activity limitations:    [] Patient's cognitive status and saf ety concerns          [] Status of current condition      [x] Weight bearing restriction  [] Cardiopulmunary Restriction    Participation Restrictions:   [] Goals and goal agreement with the patient     [] Rehab potential (prognosis) and probable outcome      Examination of Body System: (criteria)    [] 01322 - addressing 1-2 elements    [x] 76814 - addressing a total of 3 or more elements     [] 74243 -  Addressing a total of 4 or more elements         Clinical Presentation: (criteria)  Evolving - 73249     On examination of body system using standardized tests and measures patient presents with 3 or more elements from any of the following: body structures and functions, activity limitations, and/or participation restrictions.  Leading to a clinical presentation that is considered evolving with changing characteristics                              Clinical Decision Making  (Eval Complexity):  Moderate - 37908     Time Tracking:     PT Received On:  05/17/18  PT Start Time: 1200     PT Stop Time: 1235  PT Total Time (min): 35 min     Billable Minutes: Evaluation 15 minutes and Gait Training 15 minutes      Dipak Lemos, PT  05/17/2018

## 2018-05-17 NOTE — PROGRESS NOTES
Staff Handoff    Bedside report received from CECILIA Mijares. VS stable. No distress noted. Assessment complete per flowsheet. Bed locked and in lowest position. Call bell in reach.  Resident Handoff

## 2018-05-17 NOTE — PT/OT/SLP EVAL
"Occupational Therapy   Evaluation    Name: Merari Romero  MRN: [id number]  Admitting Diagnosis:  <principal problem not specified>      Recommendations:     Discharge Recommendations: rehabilitation facility  Discharge Equipment Recommendations:  bedside commode, bath bench, walker, flora  Barriers to discharge:  None    History:     Occupational Profile:  Living Environment: Pt lives in single story home with 6 steps to enter with (B) railings.  Pt lives with her spouse.   Previous level of function: Pt (I) with ADLs/IADLs  Roles and Routines: Pt was still driving  Equipment Owned:  none  Assistance upon Discharge: continuing to assess.    Past Medical History:   Diagnosis Date    DVT (deep venous thrombosis)     left leg    Hypertension     Neuropathy     Osteoporosis        Past Surgical History:   Procedure Laterality Date    FEMUR SURGERY      repair - left leg    HYSTERECTOMY  1970's    JOINT REPLACEMENT      left knee    NASAL FRACTURE SURGERY         Subjective     Chief Complaint: "I'm in pain and tired from all that work I did."    Communicated with: bro prior to session.  Pain/Comfort:  · Pain Rating 1: 4/10  · Location - Side 1: Left  · Location 1: hip  · Pain Addressed 1: Nurse notified  · Pain Rating Post-Intervention 1: 4/10    Patients cultural, spiritual, Rastafarian conflicts given the current situation: none voiced    Objective:     Patient found with:      General Precautions: Standard, fall   Orthopedic Precautions:LUE non weight bearing, LLE weight bearing as tolerated   Braces: UE Sling     Occupational Performance:    Bed Mobility:    · Patient completed Scooting/Bridging with maximal assistance  · Patient completed Supine to Sit with moderate assistance  · Patient completed Sit to Supine with moderate assistance    Functional Mobility/Transfers:  · Patient completed Sit <> Stand Transfer with minimum assistance  with  hemiwalker   · Patient completed Toilet Transfer Stand Pivot technique " "with moderate assistance with  bedside commode and hemiwalker  · Functional Mobility: Pt ambulated 3' with Mod (A) from toilet to HOB and 2 steps back to bed.    Activities of Daily Living:  · LB Dressing: maximal assistance raúl/doff pants  · Toileting: moderate assistance stand <> sit, raúl/doff pants    Cognitive/Visual Perceptual:  Cognitive/Psychosocial Skills:     -       Oriented to: Person, Place, Time and Situation   -       Follows Commands/attention:Follows multistep  commands  -       Safety awareness/insight to disability: impaired     Physical Exam:  Balance:    -       static standing, Fair, dynamic standing, Poor  Dominant hand:    -       Right  Upper Extremity Range of Motion:     -       Right Upper Extremity: ~60* ABD and FF due to previous RTC sx  -       Left Upper Extremity: restrictied due to sling  Upper Extremity Strength:    -       Right Upper Extremity: 3/5 grossly   Strength:    -       Right Upper Extremity: WFL    Patient left supine and HOB elevated with all lines intact, call button in reach, nursing notified and  present    AMPA 6 Click:  [facility] Total Score: 14      Education:    Assessment:     Merari Romero is a 78 y.o. female with a medical diagnosis of <principal problem not specified>.    Performance deficits affecting function are impaired endurance, impaired self care skills, impaired functional mobilty, gait instability, impaired balance, decreased upper extremity function, decreased safety awareness, pain.      Rehab Prognosis:  Fair; patient would benefit from acute skilled OT services to address these deficits and reach maximum level of function.         Clinical Decision Makin.  OT Low:  "Pt evaluation falls under low complexity for evaluation coding due to performance deficits noted in 1-3 areas as stated above and 0 co-morbities affecting current functional status. Data obtained from problem focused assessments. No modifications or assistance was " "required for completion of evaluation. Only brief occupational profile and history review completed."     Plan:     Patient to be seen 3 x/week, 5 x/week to address the above listed problems via self-care/home management, therapeutic activities, therapeutic exercises  · Plan of Care Expires: 05/24/18  · Plan of Care Reviewed with: patient, spouse    This Plan of care has been discussed with the patient who was involved in its development and understands and is in agreement with the identified goals and treatment plan    GOALS:    Occupational Therapy Goals        Problem: Occupational Therapy Goal    Goal Priority Disciplines Outcome Interventions   Occupational Therapy Goal     OT, PT/OT     Description:  Goals to be met by: 05/24/2018     Patient will increase functional independence with ADLs by performing:    UE Dressing to be assessed  LE Dressing with Minimal Assistance.  Toilet transfer to bedside commode with Contact Guard Assistance.                      Time Tracking:     OT Date of Treatment: 05/17/18  OT Start Time: 0300  OT Stop Time: 0330  OT Total Time (min): 30 min    Billable Minutes:Evaluation 30 min    Ani Dueñas OT  5/17/2018    "

## 2018-05-17 NOTE — PT/OT/SLP DISCHARGE
Occupational Therapy Discharge Summary    Merari Romero  MRN: [id number]   Principal Problem: <principal problem not specified>      Patient Discharged from acute Occupational Therapy on 05/17/2018.  Please refer to prior OT note dated 05/16/2018 for functional status.    Assessment:      Patient appropriate for care in another setting.    Objective:     GOALS:    Occupational Therapy Goals     Not on file                Reasons for Discontinuation of Therapy Services  Transfer to alternate level of care.      Plan:     Patient Discharged to: Skilled Nursing Facility    Ani Dueñas, OT  5/17/2018

## 2018-05-17 NOTE — PLAN OF CARE
05/17/18 1051   Discharge Reassessment   Assessment Type Discharge Planning Reassessment     Pt's  Chandana Romero signed choice forms for [facility] (SNF). Pt form will be added to chart.     Manolo Monroe LMSW

## 2018-05-17 NOTE — PT/OT/SLP PROGRESS
"Physical Therapy Treatment    Patient Name:  Merari Romero   MRN:  [id number]    Recommendations:     Discharge Recommendations:  nursing facility, skilled   Discharge Equipment Recommendations: other (see comments) (pending)   Barriers to discharge: None    Assessment:     Merari Romero is a 78 y.o. female admitted with a medical diagnosis of Closed nondisplaced fracture of neck of left radius.  She presents with the following impairments/functional limitations:  weakness, impaired functional mobilty, gait instability, impaired endurance, impaired self care skills, decreased lower extremity function .  Pt. Has partially achieved PT POC goals and anticipates D/C to SNF today.    Rehab Prognosis:  Fair; patient would benefit from acute skilled PT services to address these deficits and reach maximum level of function.      Recent Surgery: * No surgery found *      Plan:     During this hospitalization, patient to be seen  (Anticipated D/C to SNF 5/17/2018) to address the above listed problems via therapeutic exercises, therapeutic activities, gait training  · Plan of Care Expires:  05/17/18 (following a.m. PT tx. session.)   Plan of Care Reviewed with: patient, spouse    Subjective     Communicated with patient prior to session.  Patient found supine in bed upon PT entry to room, agreeable to treatment.      Patient comments/goals: "I hurt but I know I need to move."  Pain/Comfort:  · Pain Rating 1: 5/10  · Location - Side 1: Left  · Location - Orientation 1: generalized  · Location 1: hip  · Pain Addressed 1: Nurse notified, Pre-medicate for activity, Distraction, Cessation of Activity  · Pain Rating Post-Intervention 1: 5/10    Patients cultural, spiritual, Pentecostalism conflicts given the current situation:      Objective:     Patient found with: bed alarm     General Precautions: Standard, fall   Orthopedic Precautions:LUE non weight bearing, LLE weight bearing as tolerated   Braces: UE Sling     Functional " Mobility:  · Bed Mobility:     · Rolling Left:  minimum assistance  · Rolling Right: minimum assistance  · Scooting: minimum assistance  · Bridging: minimum assistance  · Supine to Sit: minimum assistance  · Sit to Supine: minimum assistance  · Transfers:     · Sit to Stand:  minimum assistance with hemiwalker  · Bed to Chair: minimum assistance with  hemiwalker  using  Step Transfer  · Gait: Gait Training:  Pt. amb. 10' with hemiwalker, mod. A.  VC's given to pt. for step placement and A.D. placement.  Pt. demonstrated decreased step height, decreased step length, decreased velocity and decreased toe-to-floor clearance  Balance:  Static Sit: FAIR+: Able to take MINIMAL challenges from all directions  Dynamic Sit: FAIR+: Maintains balance through MINIMAL excursions of active trunk motion  Static Stand: POOR+: Needs MINIMAL assist to maintain  · Dynamic stand: POOR: N/A  ·       AM-PAC 6 CLICK MOBILITY  Turning over in bed (including adjusting bedclothes, sheets and blankets)?: 2  Sitting down on and standing up from a chair with arms (e.g., wheelchair, bedside commode, etc.): 2  Moving from lying on back to sitting on the side of the bed?: 2  Moving to and from a bed to a chair (including a wheelchair)?: 2  Need to walk in hospital room?: 2  Climbing 3-5 steps with a railing?: 1  Total Score: 11       Therapeutic Activities and Exercises:   Therapeutic Activity:  Weight shifting and weight acceptance tasks performed with min. A. while pt. standing.  Transfer Training performed with assistance as noted.  VC's and manual guidance given to pt. for sequencing.  Trunk control tasks performed with min. A.while pt. seated EOB and in bedside chair.        Patient left up in chair with all lines intact, call button in reach, RN notified and spouse present..    GOALS:    Physical Therapy Goals        Problem: Physical Therapy Goal    Goal Priority Disciplines Outcome Goal Variances Interventions   Physical Therapy Goal      PT/OT, PT Ongoing (interventions implemented as appropriate)     Description:  Pt will advance recovery following  current left elbow and pelvic fractures by:  1. Assist with bed mobility leading to contact guard assist   2. Increase self help and therapeutic exercises for all extremities except left elbow  3  Progress to stand and transfer tasks with min assist   4. Iintiate walking with platform walker for left UE for short distances 25 ft/ with min assistance            Problem: Physical Therapy Goal    Goal Priority Disciplines Outcome Goal Variances Interventions   Physical Therapy Goal     PT/OT, PT      Description:  Advancing activities to accomplish anticiapted goals                    Time Tracking:     PT Received On: 05/17/18  PT Start Time: 1016     PT Stop Time: 1039  PT Total Time (min): 23 min     Billable Minutes: Gait Training 15 minutes and Therapeutic Activity 8 minutes    Treatment Type: Treatment  PT/PTA: PT           Dipak Lemos PT  05/17/2018

## 2018-05-17 NOTE — PLAN OF CARE
05/17/18 1050   Medicare Message   Important Message from Medicare regarding Discharge Appeal Rights Given to patient/caregiver;Explained to patient/caregiver;Signed/date by patient/caregiver   Date IMM was signed 05/17/18   Time IMM was signed 1050

## 2018-05-17 NOTE — DISCHARGE SUMMARY
[facility] Medicine  Discharge Summary      Patient Name: Merari Romero  MRN: [id number]  Admission Date: 5/11/2018  Hospital Length of Stay: 3 days  Discharge Date and Time:  05/17/2018 10:26 AM  Attending Physician: Michael Chacon MD   Discharging Provider: Janae Daly NP  Primary Care Provider: Shaun Barraza MD      HPI:   78 year old female fell going up stairs today and slipped on first step. Fell on left side. Work up in ED is pos for radial neck fracture and pelvic fractures. I am told that ortho was contacted. Pt is cleared for admit here. She will be admitted for pain control and PT.       * No surgery found *      Hospital Course:   Pain controlled with dilaudid   Sat up with PT this am  She is able to use bedpan but very painful     5/13  Pain controlled  Stood up with PT today     5/14  She did stand x 2 min with PT x 1, second attempt not successful due to pt fatigue. Her goal with PT : Iintiate walking with platform walker for left UE for short distances 25 ft/ with min assistance . She is using less morphine IV, on norco, received 2 doses with relief. C/o dribbling urine this am, not emptying bladder fully    5/15  She did not get up yesterday as she was having a lot of pain. Spoke with ortho and they rec non wt bearing to left upper ext and weight bearing as tolerated to left lower ext. Her goals are short distance of 25ft with platform walker using min assistance. Her pox is 90-99% on RA. Encouraged to use IS at bedside and continued on nebs every 12hr. She did have issues with urinary hesitancy so u/a collected which looks good and urine culture pending,. No fever. Will try and get hher up to bed side commode to see if that helps.     Still requiring intermittent IV pain meds for break though pain using norco 10mg po.     5/16 Pt was able to transfer to bedside chair yesterday with PT using mod assist. Pt. amb. 5' with hemiwalker, mod. A.  VC's given to pt. for  step placement and A.D. placement.  Pt. demonstrated decreased step height, decreased step length, decreased velocity and decreased toe-to-floor clearance. Ot also started with patient.     Bo Urbina saw patient this am. She rec cont with long arm splint to left upper ext and non wt bearing. Also WBAT to left lower ext and f/u Dr Lemos 3 weeks for repeat x rays.     She is still requiriong morphine for break through pain on top the norco 10mg.     POX remains 93-97%, encouraging IS at bedside as well as cont nebvs every 12hr. lovenox as well for DVT prophylaxis    5/17  Pt remains doing well. She actually ambulated 10 ft yesterday with flora walker and mod assist per PT. She requires min assist with bed mobility and transfers. This is much progress since just yesterday when she was mod assist for all movement and was not ambulating just transfering. Only required morphine once last night otherwise able to tolerate pain with po norco as needed. VVS/afebrile. POX 92-94% on RA, IS at bedside and again encouraged use. Getting better with IS. Nebs BID. lovenox for DVT prophylaxis    This am c/o nausea.  Took a norco this am at 5 without  Eating.      Consults:   Consults         Status Ordering Provider     Inpatient consult to Orthopedics  Once     Provider:  Mejia Wolf Jr., MD    Acknowledged KENIA CONTE     Inpatient consult to Orthopedics  Once     Provider:  Mejia Wolf Jr., MD    Completed RAFAEL DAMIAN     Inpatient consult to Social Work  Once     Provider:  (Not yet assigned)    Completed RAFAEL DAMIAN          * Closed nondisplaced fracture of neck of left radius    Ortho consulted via ER, non surgical orthopedic injuries per Dr Doc Wolf, non wt bearing injury to left upper ext Splinted   She also can wt bear as tolerated to left lower ext   Bo Urbina NP with ortho came 5/16, no changes, f/u Aminta 3 weeks for repeat films  Undressed splint yesterday to assess skin  tears, healing well, no s/s infection. Will undress biweekly  SWING today        Fall    Fear of falling. Cont with PT/OT          Urinary hesitancy    Check U/ a and culture- all normal            Bed confinement status    Ordering PT and nebs q 12 to reduce risk of atelectasis  Incentive spirometer >> encouraged use up to 1000 yesterday  Addde OT as she was living home with  prior to fall and able to take care of self and him  Consult inpt rehab to see if she would qualify as this is patients request she was declined as she can not tolerate 3hr. She is progressing with PT but slow.  SWING placement today        History of deep venous thrombosis    lovenox 40 daily due to hx of DVT and immobility          Closed fracture of left pubis    Non operative   Pain control try and cont po as much as poss  Try and reduce risk of associated complications of limited mobility(IS, nebs, PT)  Discussed with Bo GONZALEZ ortho. Seen patient 5/16, cont non wt bearing to left upper ext with long arm splint and wt bearing as tolerated to left lower ext. F/u Dr Lemos 3 weeks for repeat films  SWING today          Neuropathy of lower extremity    Continue low dose amitriptyline and cymbalta          HTN (hypertension)    Continue metoprol and norvasc  Bp 111//67            Final Active Diagnoses:    Diagnosis Date Noted POA    PRINCIPAL PROBLEM:  Closed nondisplaced fracture of neck of left radius [S52.135A] 05/11/2018 Yes    Fall [W19.XXXA] 05/17/2018 Yes    Urinary hesitancy [R39.11] 05/14/2018 Yes    Closed fracture of left pubis [S32.502A] 05/11/2018 Yes    History of deep venous thrombosis [Z86.718] 05/11/2018 Not Applicable    Bed confinement status [Z74.01] 05/11/2018 Not Applicable    HTN (hypertension) [I10] 08/09/2012 Yes    Neuropathy of lower extremity [G57.90] 08/09/2012 Yes      Problems Resolved During this Admission:    Diagnosis Date Noted Date Resolved POA       Discharged Condition:  good    Disposition: Swing Bed    Follow Up:  Follow-up Information     Shaun Barraza MD.    Specialty:  Family Medicine  Why:  Outpatient Services  Contact information:  [address] [facility] [place] [address]  [phone]                 Patient Instructions:   No discharge procedures on file.    Significant Diagnostic Studies:   Significant Labs:   Lab Results   Component Value Date    WBC 6.86 05/14/2018    HGB 11.6 (L) 05/14/2018    HCT 35.2 (L) 05/14/2018    MCV 89 05/14/2018     05/14/2018     BMP  Lab Results   Component Value Date     (L) 05/14/2018    K 3.9 05/14/2018    CL 98 05/14/2018    CO2 26 05/14/2018    BUN 8 05/14/2018    CREATININE 0.7 05/14/2018    CALCIUM 8.5 (L) 05/14/2018    ANIONGAP 8 05/14/2018    ESTGFRAFRICA >60 05/14/2018    EGFRNONAA >60 05/14/2018     Lab Results   Component Value Date    ALT 21 05/14/2018    AST 30 05/14/2018    ALKPHOS 83 05/14/2018    BILITOT 0.9 05/14/2018     Lab Results   Component Value Date    INR 1.1 05/11/2018     Urinalysis     Recent Labs  Lab 05/14/18  1142   COLORU Yellow   SPECGRAV 1.010   PHUR 7.0   PROTEINUA Negative   NITRITE Negative   LEUKOCYTESUR Negative   UROBILINOGEN 1.0         Significant Imaging:     Left hip x ray No definite evidence of a displaced fracture or dislocation.    The symphysis pubis is limited to evaluation due to angulation and positioning and a pubis fracture cannot be excluded..    CXR 2.3 cm confluence of shadows overlying the left upper lobe.  Consider nonurgent follow-up chest with lateral for further evaluation.    Left elbow x ray There is a fracture involving the radial neck.  Posterior fat pad is present compatible with an effusion.    Left hip x rays repeat  There are fractures involving the left symphysis pubis and the inferior ramus which were limited to evaluation on the prior study.    No evidence of a fracture of the left hip.  No dislocation.  Vascular calcifications  overlying the left femoral head and neck.      Pending Diagnostic Studies:     None         Medications:  Transfer Medications (for Discharge Readmit only):   Current Facility-Administered Medications   Medication Dose Route Frequency Provider Last Rate Last Dose    acetaminophen tablet 650 mg  650 mg Oral Q8H PRN Ton Colindres MD        albuterol-ipratropium 2.5mg-0.5mg/3mL nebulizer solution 3 mL  3 mL Nebulization Q12H Michael Chacon MD   3 mL at 05/17/18 0721    amitriptyline tablet 25 mg  25 mg Oral QHS Michael Chacon MD   25 mg at 05/16/18 2039    amLODIPine tablet 5 mg  5 mg Oral Daily Ton Colindres MD   5 mg at 05/17/18 0941    atorvastatin tablet 20 mg  20 mg Oral Daily Michael Chacon MD   20 mg at 05/17/18 0941    docusate sodium capsule 100 mg  100 mg Oral BID Michael Chacon MD   100 mg at 05/17/18 0941    DULoxetine DR capsule 30 mg  30 mg Oral Daily Michael Chacon MD   30 mg at 05/17/18 0940    enoxaparin injection 40 mg  40 mg Subcutaneous Daily Michael Chacon MD   40 mg at 05/16/18 1608    hydrocodone-acetaminophen 5-325mg per tablet 1 tablet  1 tablet Oral Q4H PRN Ton Colindres MD   1 tablet at 05/17/18 0530    metoprolol succinate (TOPROL-XL) 24 hr tablet 50 mg  50 mg Oral Daily Ton Colindres MD   50 mg at 05/17/18 0941    morphine injection 2 mg  2 mg Intravenous Q4H PRN Michael Chacon MD   2 mg at 05/16/18 2334    ondansetron injection 4 mg  4 mg Intravenous Q8H PRN Ton Colindres MD        pantoprazole EC tablet 40 mg  40 mg Oral Daily Ton Colindres MD   40 mg at 05/17/18 0941    promethazine (PHENERGAN) 12.5 mg in dextrose 5 % 50 mL IVPB  12.5 mg Intravenous Q6H PRN Ton Colindres MD           Indwelling Lines/Drains at time of discharge:   Lines/Drains/Airways          No matching active lines, drains, or airways          Time spent on the discharge of patient: 20 minutes  Patient was seen and examined on the date of discharge  and determined to be suitable for discharge.         Janae Daly NP  Department of Hospital Medicine  [facility]

## 2018-05-17 NOTE — ASSESSMENT & PLAN NOTE
Ortho consulted via ER, non surgical orthopedic injuries per Dr Doc Wolf, non wt bearing injury to left upper ext Splinted   She also can wt bear as tolerated to left lower ext   Bo Urbina NP with ortho came 5/16, no changes, f/u Aminta 3 weeks for repeat films  Undressed splint yesterday to assess skin tears, healing well, no s/s infection. Will undress biweekly  SWING today

## 2018-05-17 NOTE — ASSESSMENT & PLAN NOTE
Non operative   Pain control try and cont po as much as poss  Try and reduce risk of associated complications of limited mobility(IS, nebs, PT)  Discussed with Bo GONZALEZ ortho. Seen patient 5/16, cont non wt bearing to left upper ext with long arm splint and wt bearing as tolerated to left lower ext. F/u Dr Lemos 3 weeks for repeat films  SWING today

## 2018-05-17 NOTE — PLAN OF CARE
Problem: Patient Care Overview  Goal: Plan of Care Review  Outcome: Ongoing (interventions implemented as appropriate)  Patient aware of plan of care. Patient had a good night. VS stable. C/o left sided pelvic pain, relieved with hydrocodone and morphine.  Granddaughter at bedside. Turn Q2H. Free from falls/injuries. No questions or concerns at this time. Agrees with plan of care.

## 2018-05-17 NOTE — SUBJECTIVE & OBJECTIVE
Interval History: see      Review of Systems   Constitutional: Negative for chills and fever.   Respiratory: Negative for shortness of breath.    Cardiovascular: Negative for chest pain and palpitations.   Gastrointestinal: Negative for abdominal pain, blood in stool, constipation and nausea.   Genitourinary: Negative for difficulty urinating and pelvic pain.   Musculoskeletal: Positive for arthralgias and myalgias.        Left hip pain with moving as well as left arm pain with moving   Skin:        Left middle toe bruised   Psychiatric/Behavioral: Negative for dysphoric mood, sleep disturbance and suicidal ideas. The patient is not nervous/anxious.      Objective:     Vital Signs (Most Recent):  Temp: 96.4 °F (35.8 °C) (05/17/18 0419)  Pulse: 80 (05/17/18 0721)  Resp: 16 (05/17/18 0721)  BP: 120/72 (05/17/18 0419)  SpO2: (!) 93 % (05/17/18 0721) Vital Signs (24h Range):  Temp:  [96.4 °F (35.8 °C)-97.1 °F (36.2 °C)] 96.4 °F (35.8 °C)  Pulse:  [73-88] 80  Resp:  [16-18] 16  SpO2:  [92 %-94 %] 93 %  BP: (111-135)/(58-74) 120/72     Weight: 64 kg (141 lb)  Body mass index is 24.98 kg/m².    Intake/Output Summary (Last 24 hours) at 05/17/18 0742  Last data filed at 05/17/18 0500   Gross per 24 hour   Intake                0 ml   Output             1800 ml   Net            -1800 ml      Physical Exam   Constitutional: She is oriented to person, place, and time. She appears well-developed and well-nourished.   HENT:   Head: Normocephalic and atraumatic.   Eyes: EOM are normal. Pupils are equal, round, and reactive to light.   Neck: Normal range of motion. Neck supple.   Cardiovascular: Normal rate, regular rhythm, normal heart sounds and intact distal pulses.    No murmur heard.  Pulmonary/Chest: Effort normal and breath sounds normal. No respiratory distress. She has no wheezes. She has no rales. She exhibits no tenderness.   Abdominal: Soft. Bowel sounds are normal. She exhibits no distension and no mass. There is no  tenderness. There is no rebound and no guarding.   Musculoskeletal: Normal range of motion. She exhibits no edema or tenderness.   LUE posterior splint with ACE easily palp pulse    Left pelvic TTP foot palp pulse   Neurological: She is alert and oriented to person, place, and time. She has normal reflexes.   Skin: Skin is warm and dry. Capillary refill takes less than 2 seconds. No rash noted. No erythema. No pallor.   Psychiatric: She has a normal mood and affect. Her behavior is normal. Judgment and thought content normal.       Significant Labs:   Lab Results   Component Value Date    WBC 6.86 05/14/2018    HGB 11.6 (L) 05/14/2018    HCT 35.2 (L) 05/14/2018    MCV 89 05/14/2018     05/14/2018     BMP  Lab Results   Component Value Date     (L) 05/14/2018    K 3.9 05/14/2018    CL 98 05/14/2018    CO2 26 05/14/2018    BUN 8 05/14/2018    CREATININE 0.7 05/14/2018    CALCIUM 8.5 (L) 05/14/2018    ANIONGAP 8 05/14/2018    ESTGFRAFRICA >60 05/14/2018    EGFRNONAA >60 05/14/2018     Lab Results   Component Value Date    ALT 21 05/14/2018    AST 30 05/14/2018    ALKPHOS 83 05/14/2018    BILITOT 0.9 05/14/2018     Lab Results   Component Value Date    INR 1.1 05/11/2018     Urinalysis     Recent Labs  Lab 05/14/18  1142   COLORU Yellow   SPECGRAV 1.010   PHUR 7.0   PROTEINUA Negative   NITRITE Negative   LEUKOCYTESUR Negative   UROBILINOGEN 1.0         Significant Imaging:     Left hip x ray No definite evidence of a displaced fracture or dislocation.    The symphysis pubis is limited to evaluation due to angulation and positioning and a pubis fracture cannot be excluded..    CXR 2.3 cm confluence of shadows overlying the left upper lobe.  Consider nonurgent follow-up chest with lateral for further evaluation.    Left elbow x ray There is a fracture involving the radial neck.  Posterior fat pad is present compatible with an effusion.    Left hip x rays repeat  There are fractures involving the left  symphysis pubis and the inferior ramus which were limited to evaluation on the prior study.    No evidence of a fracture of the left hip.  No dislocation.  Vascular calcifications overlying the left femoral head and neck.

## 2018-05-17 NOTE — PT/OT/SLP DISCHARGE
Physical Therapy Discharge Summary    Name: Merari Romero  MRN: [id number]   Principal Problem: Closed nondisplaced fracture of neck of left radius     Patient Discharged from acute Physical Therapy on 5/17/2018 following a.m. PT tx. session.  Please refer to prior PT noted date on 5/17/2018 for functional status.     Assessment:     Patient appropriate for care in another setting.    Objective:     GOALS:    Physical Therapy Goals        Problem: Physical Therapy Goal    Goal Priority Disciplines Outcome Goal Variances Interventions   Physical Therapy Goal     PT/OT, PT Ongoing (interventions implemented as appropriate)     Description:  Pt will advance recovery following  current left elbow and pelvic fractures by:  1. Assist with bed mobility leading to contact guard assist   2. Increase self help and therapeutic exercises for all extremities except left elbow  3  Progress to stand and transfer tasks with min assist   4. Iintiate walking with platform walker for left UE for short distances 25 ft/ with min assistance            Problem: Physical Therapy Goal    Goal Priority Disciplines Outcome Goal Variances Interventions   Physical Therapy Goal     PT/OT, PT      Description:  Advancing activities to accomplish anticiapted goals                    Reasons for Discontinuation of Therapy Services  Transfer to alternate level of care.      Plan:     Patient Discharged to: Skilled Nursing Facility.    Dipak Lemos, PT  5/17/2018

## 2018-05-18 PROBLEM — E63.9 INADEQUATE DIETARY ENERGY INTAKE: Status: ACTIVE | Noted: 2018-05-18

## 2018-05-18 PROBLEM — R53.81 DEBILITY: Status: ACTIVE | Noted: 2018-05-18

## 2018-05-18 PROCEDURE — 25000003 PHARM REV CODE 250: Performed by: NURSE PRACTITIONER

## 2018-05-18 PROCEDURE — 94761 N-INVAS EAR/PLS OXIMETRY MLT: CPT

## 2018-05-18 PROCEDURE — 11000004 HC SNF PRIVATE

## 2018-05-18 PROCEDURE — 97802 MEDICAL NUTRITION INDIV IN: CPT

## 2018-05-18 PROCEDURE — 99900035 HC TECH TIME PER 15 MIN (STAT)

## 2018-05-18 PROCEDURE — 94640 AIRWAY INHALATION TREATMENT: CPT

## 2018-05-18 PROCEDURE — 97116 GAIT TRAINING THERAPY: CPT

## 2018-05-18 PROCEDURE — 97530 THERAPEUTIC ACTIVITIES: CPT

## 2018-05-18 PROCEDURE — 63600175 PHARM REV CODE 636 W HCPCS: Performed by: NURSE PRACTITIONER

## 2018-05-18 PROCEDURE — 94799 UNLISTED PULMONARY SVC/PX: CPT

## 2018-05-18 PROCEDURE — 25000242 PHARM REV CODE 250 ALT 637 W/ HCPCS: Performed by: NURSE PRACTITIONER

## 2018-05-18 PROCEDURE — 99222 1ST HOSP IP/OBS MODERATE 55: CPT | Mod: ,,, | Performed by: FAMILY MEDICINE

## 2018-05-18 RX ADMIN — IPRATROPIUM BROMIDE AND ALBUTEROL SULFATE 3 ML: 2.5; .5 SOLUTION RESPIRATORY (INHALATION) at 06:05

## 2018-05-18 RX ADMIN — IPRATROPIUM BROMIDE AND ALBUTEROL SULFATE 3 ML: 2.5; .5 SOLUTION RESPIRATORY (INHALATION) at 07:05

## 2018-05-18 RX ADMIN — ACETAMINOPHEN 650 MG: 325 TABLET ORAL at 08:05

## 2018-05-18 RX ADMIN — HYDROCODONE BITARTRATE AND ACETAMINOPHEN 1 TABLET: 5; 325 TABLET ORAL at 11:05

## 2018-05-18 RX ADMIN — METOPROLOL SUCCINATE 50 MG: 50 TABLET, EXTENDED RELEASE ORAL at 08:05

## 2018-05-18 RX ADMIN — PANTOPRAZOLE SODIUM 40 MG: 40 TABLET, DELAYED RELEASE ORAL at 08:05

## 2018-05-18 RX ADMIN — HYDROCODONE BITARTRATE AND ACETAMINOPHEN 1 TABLET: 5; 325 TABLET ORAL at 06:05

## 2018-05-18 RX ADMIN — AMLODIPINE BESYLATE 5 MG: 5 TABLET ORAL at 08:05

## 2018-05-18 RX ADMIN — ONDANSETRON 4 MG: 2 SOLUTION INTRAMUSCULAR; INTRAVENOUS at 02:05

## 2018-05-18 RX ADMIN — DULOXETINE 30 MG: 30 CAPSULE, DELAYED RELEASE ORAL at 08:05

## 2018-05-18 RX ADMIN — ATORVASTATIN CALCIUM 20 MG: 20 TABLET, FILM COATED ORAL at 08:05

## 2018-05-18 RX ADMIN — ENOXAPARIN SODIUM 40 MG: 100 INJECTION SUBCUTANEOUS at 05:05

## 2018-05-18 RX ADMIN — AMITRIPTYLINE HYDROCHLORIDE 25 MG: 25 TABLET, FILM COATED ORAL at 08:05

## 2018-05-18 RX ADMIN — HYDROCODONE BITARTRATE AND ACETAMINOPHEN 1 TABLET: 5; 325 TABLET ORAL at 10:05

## 2018-05-18 RX ADMIN — HYDROCODONE BITARTRATE AND ACETAMINOPHEN 1 TABLET: 5; 325 TABLET ORAL at 03:05

## 2018-05-18 RX ADMIN — DOCUSATE SODIUM 100 MG: 100 CAPSULE, LIQUID FILLED ORAL at 08:05

## 2018-05-18 NOTE — ASSESSMENT & PLAN NOTE
Related to (etiology):   Nausea    Signs and Symptoms (as evidenced by):   Taking pain meds without eating     Interventions/Recommendations (treatment strategy):  Continue Low Sodium diet  RD to monitor    Nutrition Diagnosis Status:   Improving

## 2018-05-18 NOTE — PLAN OF CARE
Problem: Patient Care Overview  Goal: Plan of Care Review  Outcome: Ongoing (interventions implemented as appropriate)  Patient without incidence or injury this shift. Sling maintained to left arm. Hydrocodone prn pain. Emesis 50 Ml X 1. Zofran prn nausea/emesis. Patient admits to feeling better after emesis. Safety and comfort maintained. PT/OT. Patient requires max assist with getting up out of bed/ambulation. Unsteady gait due to hip pain.  Call bell in reach.

## 2018-05-18 NOTE — ASSESSMENT & PLAN NOTE
Non operative   Pain control try and cont po as much as poss  Try and reduce risk of associated complications of limited mobility(IS, nebs, PT)  Discussed with Bo GONZALEZ ortho. Seen patient 5/16, cont non wt bearing to left upper ext with long arm splint and wt bearing as tolerated to left lower ext. F/u Dr Lemos 3 weeks for repeat films

## 2018-05-18 NOTE — H&P
[facility] Medicine  History & Physical    Patient Name: Merari Romero  MRN: [id number]  Admission Date: 5/17/2018  Attending Physician: Michael Chacon MD   Primary Care Provider: Shaun Barraza MD         Patient information was obtained from patient and ER records.     Subjective:     Principal Problem:<principal problem not specified>    Chief Complaint: No chief complaint on file.       HPI: 5/18/18 SWING ADMIT  78 year old female admitted 5/11/18 for Closed nondisplaced fracture of neck of left radius after a falll going up stairs, slipped on first step.. She was admitted for pain control and PT. She is progressing with PT but slow. Did not qualify for inpatient rehab as can not tolerate 3hr of activity.    SWING placement yesterday for continued PT/OT . She was living at home with  prior to fall and able to take care of self and him.  Patient had a restful night. VS stable. Some  C/o left sided pelvic pain, relieved with hydrocodone x2 and morphine x1 for breakthrough pain. Ace wrap and sling to left arm intact. She reports she tolerated PT well yesterday.  Encouraged continue IS.    and daughter at bedside, supportive. Pt. amb. 10' with hemiwalker, mod. A. No c/o SOB or fevers this morning.    Interval History: see HC     Review of Systems   Constitutional: Negative for chills and fever.   Respiratory: Negative for shortness of breath.    Cardiovascular: Negative for chest pain and palpitations.   Gastrointestinal: Negative for abdominal pain, blood in stool, constipation and nausea.   Genitourinary: Negative for difficulty urinating and pelvic pain.   Musculoskeletal: Positive for arthralgias and myalgias.        Left hip pain with moving as well as left arm pain with moving   Skin:        Left middle toe bruised   Psychiatric/Behavioral: Negative for dysphoric mood, sleep disturbance and suicidal ideas. The patient is not nervous/anxious.      Objective:      Vital Signs (Most Recent):  Temp: 98.1 °F (36.7 °C) (05/18/18 0451)  Pulse: 81 (05/18/18 0451)  Resp: 18 (05/18/18 0451)  BP: (!) 143/67 (05/18/18 0451)  SpO2: (!) 94 % (05/18/18 0451) Vital Signs (24h Range):  Temp:  [96.2 °F (35.7 °C)-98.1 °F (36.7 °C)] 98.1 °F (36.7 °C)  Pulse:  [76-91] 81  Resp:  [16-18] 18  SpO2:  [91 %-95 %] 94 %  BP: (108-147)/(58-75) 143/67     Weight: 64 kg (141 lb 1.5 oz)  Body mass index is 24.99 kg/m².    Intake/Output Summary (Last 24 hours) at 05/18/18 0650  Last data filed at 05/17/18 1349   Gross per 24 hour   Intake                0 ml   Output              400 ml   Net             -400 ml      Physical Exam   Constitutional: She is oriented to person, place, and time. She appears well-developed. No distress.   HENT:   Head: Normocephalic and atraumatic.   Eyes: Conjunctivae are normal. Pupils are equal, round, and reactive to light.   Neck: Normal range of motion. Neck supple. No thyromegaly present.   Cardiovascular: Normal rate, regular rhythm, normal heart sounds and intact distal pulses.    Pulmonary/Chest: Effort normal and breath sounds normal. No respiratory distress. She has no wheezes.   Abdominal: Soft. Bowel sounds are normal. There is no tenderness.   Musculoskeletal: She exhibits tenderness (hip bilaterally). She exhibits no edema.   Left sling in place   Lymphadenopathy:     She has no cervical adenopathy.   Neurological: She is alert and oriented to person, place, and time.   Skin: Skin is warm and dry. No rash noted.   Psychiatric: She has a normal mood and affect. Her behavior is normal.   Nursing note and vitals reviewed.      Significant Labs:   Lab Results   Component Value Date    WBC 8.02 05/17/2018    HGB 11.7 (L) 05/17/2018    HCT 35.2 (L) 05/17/2018    MCV 88 05/17/2018     05/17/2018     BMP  Lab Results   Component Value Date     (L) 05/14/2018    K 3.9 05/14/2018    CL 98 05/14/2018    CO2 26 05/14/2018    BUN 8 05/14/2018    CREATININE  0.7 05/17/2018    CALCIUM 8.5 (L) 05/14/2018    ANIONGAP 8 05/14/2018    ESTGFRAFRICA >60 05/17/2018    EGFRNONAA >60 05/17/2018     Lab Results   Component Value Date    ALT 21 05/14/2018    AST 30 05/14/2018    ALKPHOS 83 05/14/2018    BILITOT 0.9 05/14/2018     Lab Results   Component Value Date    INR 1.1 05/11/2018     Urinalysis     Recent Labs  Lab 05/14/18  1142   COLORU Yellow   SPECGRAV 1.010   PHUR 7.0   PROTEINUA Negative   NITRITE Negative   LEUKOCYTESUR Negative   UROBILINOGEN 1.0         Significant Imaging:     Left hip x ray No definite evidence of a displaced fracture or dislocation.    The symphysis pubis is limited to evaluation due to angulation and positioning and a pubis fracture cannot be excluded..    CXR 2.3 cm confluence of shadows overlying the left upper lobe.  Consider nonurgent follow-up chest with lateral for further evaluation.    Left elbow x ray There is a fracture involving the radial neck.  Posterior fat pad is present compatible with an effusion.    Left hip x rays repeat  There are fractures involving the left symphysis pubis and the inferior ramus which were limited to evaluation on the prior study.    No evidence of a fracture of the left hip.  No dislocation.  Vascular calcifications overlying the left femoral head and neck.        CRANIAL NERVES     CN III, IV, VI   Pupils are equal, round, and reactive to light.        Assessment/Plan:     Debility      Continue PT/OT  IS encouraged.         Fall    Fear of falling. Cont with PT/OT          Urinary hesitancy    U/ a and culture- all normal          History of deep venous thrombosis      lovenox 40 daily due to hx of DVT and immobility        Closed fracture of left pubis    Non operative   Pain control try and cont po as much as poss  Try and reduce risk of associated complications of limited mobility(IS, nebs, PT)  Discussed with Bo GONZALEZ ortho. Seen patient 5/16, cont non wt bearing to left upper ext with long arm splint  and wt bearing as tolerated to left lower ext. F/u Dr Lemos 3 weeks for repeat films        Closed nondisplaced fracture of neck of left radius    Ortho consulted via ER, non surgical orthopedic injuries per Dr Doc Wolf, non wt bearing injury to left upper ext Splinted   She also can wt bear as tolerated to left lower ext   oB Urbina NP with ortho came 5/16, no changes, f/u Aminta 3 weeks for repeat films  Undressed splint 5/16 to assess skin tears, healing well, no s/s infection. Will undress biweekly            VTE Risk Mitigation         Ordered     enoxaparin injection 40 mg  Daily      05/17/18 1159     IP VTE HIGH RISK PATIENT  Once      05/17/18 1158     Place sequential compression device  Until discontinued      05/17/18 1158             Camille Gauthier MD  Department of Hospital Medicine   [facility]

## 2018-05-18 NOTE — SUBJECTIVE & OBJECTIVE
Interval History: see      Review of Systems   Constitutional: Negative for chills and fever.   Respiratory: Negative for shortness of breath.    Cardiovascular: Negative for chest pain and palpitations.   Gastrointestinal: Negative for abdominal pain, blood in stool, constipation and nausea.   Genitourinary: Negative for difficulty urinating and pelvic pain.   Musculoskeletal: Positive for arthralgias and myalgias.        Left hip pain with moving as well as left arm pain with moving   Skin:        Left middle toe bruised   Psychiatric/Behavioral: Negative for dysphoric mood, sleep disturbance and suicidal ideas. The patient is not nervous/anxious.      Objective:     Vital Signs (Most Recent):  Temp: 98.1 °F (36.7 °C) (05/18/18 0451)  Pulse: 81 (05/18/18 0451)  Resp: 18 (05/18/18 0451)  BP: (!) 143/67 (05/18/18 0451)  SpO2: (!) 94 % (05/18/18 0451) Vital Signs (24h Range):  Temp:  [96.2 °F (35.7 °C)-98.1 °F (36.7 °C)] 98.1 °F (36.7 °C)  Pulse:  [76-91] 81  Resp:  [16-18] 18  SpO2:  [91 %-95 %] 94 %  BP: (108-147)/(58-75) 143/67     Weight: 64 kg (141 lb 1.5 oz)  Body mass index is 24.99 kg/m².    Intake/Output Summary (Last 24 hours) at 05/18/18 0650  Last data filed at 05/17/18 1349   Gross per 24 hour   Intake                0 ml   Output              400 ml   Net             -400 ml      Physical Exam   Constitutional: She is oriented to person, place, and time. She appears well-developed. No distress.   HENT:   Head: Normocephalic and atraumatic.   Eyes: Conjunctivae are normal. Pupils are equal, round, and reactive to light.   Neck: Normal range of motion. Neck supple. No thyromegaly present.   Cardiovascular: Normal rate, regular rhythm, normal heart sounds and intact distal pulses.    Pulmonary/Chest: Effort normal and breath sounds normal. No respiratory distress. She has no wheezes.   Abdominal: Soft. Bowel sounds are normal. There is no tenderness.   Musculoskeletal: She exhibits tenderness (hip  bilaterally). She exhibits no edema.   Left sling in place   Lymphadenopathy:     She has no cervical adenopathy.   Neurological: She is alert and oriented to person, place, and time.   Skin: Skin is warm and dry. No rash noted.   Psychiatric: She has a normal mood and affect. Her behavior is normal.   Nursing note and vitals reviewed.      Significant Labs:   Lab Results   Component Value Date    WBC 8.02 05/17/2018    HGB 11.7 (L) 05/17/2018    HCT 35.2 (L) 05/17/2018    MCV 88 05/17/2018     05/17/2018     BMP  Lab Results   Component Value Date     (L) 05/14/2018    K 3.9 05/14/2018    CL 98 05/14/2018    CO2 26 05/14/2018    BUN 8 05/14/2018    CREATININE 0.7 05/17/2018    CALCIUM 8.5 (L) 05/14/2018    ANIONGAP 8 05/14/2018    ESTGFRAFRICA >60 05/17/2018    EGFRNONAA >60 05/17/2018     Lab Results   Component Value Date    ALT 21 05/14/2018    AST 30 05/14/2018    ALKPHOS 83 05/14/2018    BILITOT 0.9 05/14/2018     Lab Results   Component Value Date    INR 1.1 05/11/2018     Urinalysis     Recent Labs  Lab 05/14/18  1142   COLORU Yellow   SPECGRAV 1.010   PHUR 7.0   PROTEINUA Negative   NITRITE Negative   LEUKOCYTESUR Negative   UROBILINOGEN 1.0         Significant Imaging:     Left hip x ray No definite evidence of a displaced fracture or dislocation.    The symphysis pubis is limited to evaluation due to angulation and positioning and a pubis fracture cannot be excluded..    CXR 2.3 cm confluence of shadows overlying the left upper lobe.  Consider nonurgent follow-up chest with lateral for further evaluation.    Left elbow x ray There is a fracture involving the radial neck.  Posterior fat pad is present compatible with an effusion.    Left hip x rays repeat  There are fractures involving the left symphysis pubis and the inferior ramus which were limited to evaluation on the prior study.    No evidence of a fracture of the left hip.  No dislocation.  Vascular calcifications overlying the left  femoral head and neck.        CRANIAL NERVES     CN III, IV, VI   Pupils are equal, round, and reactive to light.

## 2018-05-18 NOTE — ASSESSMENT & PLAN NOTE
Ortho consulted via ER, non surgical orthopedic injuries per Dr Doc Wolf, non wt bearing injury to left upper ext Splinted   She also can wt bear as tolerated to left lower ext   Bo Urbina NP with ortho came 5/16, no changes, f/u Aminta 3 weeks for repeat films  Undressed splint 5/16 to assess skin tears, healing well, no s/s infection. Will undress biweekly

## 2018-05-18 NOTE — PROGRESS NOTES
Staff Handoff    Bedside report received from CECILIA Mijares. Patient lying in bed visiting with daughter. No distress noted. VS stable. Assessment complete per flowsheet. Bed locked and in lowest position. Call bell in reach.   Resident Handoff

## 2018-05-18 NOTE — PT/OT/SLP PROGRESS
"Physical Therapy Treatment    Patient Name:  Merari Romero   MRN:  [id number]    Recommendations:     Discharge Recommendations:  home with home health, home health PT, rehabilitation facility   Discharge Equipment Recommendations:  (TBD)   Barriers to discharge: None    Assessment:     Merari Romero is a 78 y.o. female admitted with a medical diagnosis of <principal problem not specified>.  She presents with the following impairments/functional limitations:  weakness, impaired endurance, impaired self care skills, impaired functional mobilty, gait instability, impaired balance, decreased upper extremity function, decreased lower extremity function, decreased safety awareness, pain . Pt with limited ambulation secondary to pain with WB to LLE. Pt easily fatigued and increased weakness in left hip flexors today.    Rehab Prognosis:  Good; patient would benefit from acute skilled PT services to address these deficits and reach maximum level of function.      Recent Surgery: * No surgery found *      Plan:     During this hospitalization, patient to be seen  (1-2x/day Monday-Friday; PRN Weekends/Holidays) to address the above listed problems via gait training, therapeutic activities, therapeutic exercises  · Plan of Care Expires:   (upon d/C from facility)   Plan of Care Reviewed with: patient, daughter, spouse    Subjective     Communicated with patient prior to session.  Patient found supine in bed upon PT entry to room, agreeable to treatment.      Chief Complaint: "My leg hurts when I stand on it"  Patient comments/goals: "Get stronger and walk again"  Pain/Comfort:  · Pain Rating 1: 0/10    Patients cultural, spiritual, Anabaptist conflicts given the current situation:      Objective:     Patient found with:       General Precautions: Standard, fall   Orthopedic Precautions:LUE non weight bearing, LLE non weight bearing   Braces: UE Sling     Functional Mobility:  · Bed Mobility:     · Rolling Left:  moderate " assistance  · Rolling Right: moderate assistance  · Scooting: moderate assistance  · Bridging: moderate assistance  · Supine to Sit: moderate assistance  · Sit to Supine: moderate assistance  · Transfers:     · Sit to Stand:  moderate assistance with hemiwalker  · Bed to Chair: moderate assistance with  hemiwalker  using  Step Transfer  · Gait: Gait training with hemiwalker and mod assist x 8 steps with cueing for gait sequencing and upright posturing to decrease pt fall risk.       AM-PAC 6 CLICK MOBILITY  Turning over in bed (including adjusting bedclothes, sheets and blankets)?: 2  Sitting down on and standing up from a chair with arms (e.g., wheelchair, bedside commode, etc.): 2  Moving from lying on back to sitting on the side of the bed?: 2  Moving to and from a bed to a chair (including a wheelchair)?: 2  Need to walk in hospital room?: 2  Climbing 3-5 steps with a railing?: 1  Total Score: 11       Therapeutic Activities and Exercises:   Pt performed LLE A/A therapeutic exercises to improve LLE strength including hip flexion, hip abd/add, LAQ, ankle pumps with rest breaks as needed to increase strength and endurance with all gross mobility skills.     Patient left up in chair with all lines intact, call button in reach, NSG notified and family present..    GOALS:    Physical Therapy Goals        Problem: Physical Therapy Goal    Goal Priority Disciplines Outcome Goal Variances Interventions   Physical Therapy Goal     PT/OT, PT      Description:  Goals to be met by: 2018     Patient will increase functional independence with mobility by performin. Supine to sit with Stand-by Assistance  2. Sit to supine with Stand-by Assistance  3. Rolling to Left and Right with Stand-by Assistance.  4. Sit to stand transfer with Stand-by Assistance, using bronson-walker  5. Bed to chair transfer with Stand-by Assistance using Bronson-walker  6. Gait  x 50 feet with Stand-by Assistance using Bronson-walker.                        Time Tracking:     PT Received On: 05/18/18  PT Start Time: 1303     PT Stop Time: 1326  PT Total Time (min): 23 min     Billable Minutes: Gait Training 13 minutes and Therapeutic Activity 10 minutes    Treatment Type: Treatment  PT/PTA: PT           Paige Ramirez, PT  05/18/2018

## 2018-05-18 NOTE — HPI
5/18/18 SWING ADMIT  78 year old female admitted 5/11/18 for Closed nondisplaced fracture of neck of left radius after a falll going up stairs, slipped on first step.. She was admitted for pain control and PT. She is progressing with PT but slow. Did not qualify for inpatient rehab as can not tolerate 3hr of activity.    SWING placement yesterday for continued PT/OT . She was living at home with  prior to fall and able to take care of self and him.  Patient had a restful night. VS stable. Some  C/o left sided pelvic pain, relieved with hydrocodone x2 and morphine x1 for breakthrough pain. Ace wrap and sling to left arm intact. She reports she tolerated PT well yesterday.  Encouraged continue IS.    and daughter at bedside, supportive. Pt. amb. 10' with vick, mod. A. No c/o SOB or fevers this morning.

## 2018-05-18 NOTE — PT/OT/SLP PROGRESS
"Occupational Therapy   Treatment    Name: Merari Romero  MRN: [id number]  Admitting Diagnosis:  <principal problem not specified>       Recommendations:     Discharge Recommendations:  (assessment ongoing)  Discharge Equipment Recommendations:   (assessment ongoing)    Subjective     Communicated with: nursing prior to session.  Pain/Comfort:  · Pain Rating 1:  (pt didn't report pain but reported, "I am very nauseated." per pt)  · Pain Addressed 1: Nurse notified    Patients cultural, spiritual, Sabianist conflicts given the current situation:  (none verbalized)    Objective:     Patient found with:  (sitting in bedside chair on OT arrival)    General Precautions: Standard, fall   Orthopedic Precautions:LUE non weight bearing, LLE weight bearing as tolerated   Braces: UE brace     Occupational Performance:    Activities of Daily Living:  · UB Dressing: maximal assistance only performed to elbows simulated rest due to patient beginning to feel nauseated  · LB Dressing: demonstrated all LE adaptive equipment patient attempted use of reacher and sock aid but activity was terminated due to significant nausea, nursing was notified     Patient left up in bedside chair with all lines intact, call button in reach and spouse and family member present    Education:  Education:  Educated pt and family on use of lower body dressing adaptive equipment with all verbalizing understanding. Also spoke with family member on where to purchase equipment if it is wanted.  Assessment:     Merari Romero is a 78 y.o. female with a medical diagnosis of <principal problem not specified>.  Performance deficits affecting function are weakness, impaired endurance, impaired self care skills, impaired functional mobilty, decreased upper extremity function, pain.      Rehab Prognosis:  good; patient would benefit from acute skilled OT services to address these deficits and reach maximum level of function.       Plan:     Patient to be seen 3 x/week, 5 " x/week to address the above listed problems via self-care/home management, therapeutic activities, therapeutic exercises  · Plan of Care Expires: 05/24/18  · Plan of Care Reviewed with: patient, caregiver    This Plan of care has been discussed with the patient who was involved in its development and understands and is in agreement with the identified goals and treatment plan    GOALS:    Occupational Therapy Goals        Problem: Occupational Therapy Goal    Goal Priority Disciplines Outcome Interventions   Occupational Therapy Goal     OT, PT/OT Ongoing (interventions implemented as appropriate)    Description:  Goals to be met by: 05/24/2018     Patient will increase functional independence with ADLs by performing:    UE Dressing to be assessed  LE Dressing with Minimal Assistance.  Toilet transfer to bedside commode with Contact Guard Assistance.                      Time Tracking:     OT Date of Treatment: 05/18/18  OT Start Time: 1345  OT Stop Time: 1410  OT Total Time (min): 25 min    Billable Minutes:Therapeutic Activity 25 min    LEYLA Plascencia  5/18/2018

## 2018-05-18 NOTE — PROGRESS NOTES
" [facility]  Adult Nutrition  Progress Note    SUMMARY       Recommendations    Recommendation/Intervention: Continue Low Sodium diet as tolerated encouraging good intake of all meals. RD to monitor  Goals: adequate po intake >=75% by discharge  Nutrition Goal Status: new  Communication of RD Recs:  (poc reviewed)    Nutrition Discharge Planning: Low Sodium diet with adequate intake to meet EEN & EPN    Reason for Assessment    Reason for Assessment: identified at risk by screening criteria (New SWing)  Diagnosis:  (fracture of left radius)  Relevant Medical History: HTN, DVT, Osteoporosis  General Information Comments: Pt admitted to swing for continued PT/OT; some Nausea reported yesterday due to taking pain meds without eating.      Nutrition Risk Screen    Nutrition Risk Screen: no indicators present    Nutrition/Diet History    Patient Reported Diet/Restrictions/Preferences: low salt  Do you have any cultural, spiritual, Amish conflicts, given your current situation?: none reported  Food Allergies: NKFA  Factors Affecting Nutritional Intake: pain, nausea/vomiting    Anthropometrics    Temp: 98.9 °F (37.2 °C)  Height Method: Stated  Height: 5' 3" (160 cm)  Height (inches): 63 in  Weight Method: Bed Scale  Weight: 64 kg (141 lb 1.5 oz) (rd reviewed)  Weight (lb): 141.1 lb  Ideal Body Weight (IBW), Female: 115 lb  % Ideal Body Weight, Female (lb): 122.7 lb  BMI (Calculated): 25  BMI Grade: 25 - 29.9 - overweight       Lab/Procedures/Meds    Pertinent Labs Reviewed: reviewed  Pertinent Medications Reviewed: reviewed    Physical Findings/Assessment    Overall Physical Appearance: advanced age, weak  Tubes:  (-)  Oral/Mouth Cavity: no grinding or difficulty chewing    Estimated/Assessed Needs    Weight Used For Calorie Calculations: 64 kg (141 lb 1.5 oz)  Energy Calorie Requirements (kcal): 1349  Energy Need Method: Mitesh-[place]neal (x1.25)  Protein Requirements: 64-76 (1.0-1.2)  Weight Used " For Protein Calculations: 64 kg (141 lb 1.5 oz)  Fluid Requirements (mL): 1ml/kcal or per MD     RDA Method (mL): 1349         Nutrition Prescription Ordered    Current Diet Order: Low Sodium  Nutrition Order Comments: TBD    Evaluation of Received Nutrient/Fluid Intake    Tolerance: tolerating  % Intake of Estimated Energy Needs: Other: TBD  % Meal Intake: Other: TBD    Nutrition Risk    Level of Risk/Frequency of Follow-up:  (F/U 2x/wk)     Assessment and Plan    Inadequate dietary energy intake    Related to (etiology):   Nausea    Signs and Symptoms (as evidenced by):   Taking pain meds without eating     Interventions/Recommendations (treatment strategy):  Continue Low Sodium diet  RD to monitor    Nutrition Diagnosis Status:   Improving               Monitor and Evaluation    Food and Nutrient Intake: food and beverage intake  Food and Nutrient Adminstration: diet order  Knowledge/Beliefs/Attitudes: food and nutrition knowledge/skill, beliefs and attitudes  Physical Activity and Function: nutrition-related ADLs and IADLs  Anthropometric Measurements: weight, weight change  Biochemical Data, Medical Tests and Procedures: electrolyte and renal panel  Nutrition-Focused Physical Findings: overall appearance     Nutrition Follow-Up    RD Follow-up?: Yes

## 2018-05-18 NOTE — PLAN OF CARE
Problem: Patient Care Overview  Goal: Plan of Care Review  Outcome: Ongoing (interventions implemented as appropriate)  Nutrition Goals: adequate po intake >=75% by discharge  Nutrition Goal Status: new  Communication of RD Recs:  (poc reviewed)    Nutrition Discharge Planning: Low Sodium diet with adequate intake to meet EEN & EPN

## 2018-05-18 NOTE — PLAN OF CARE
Problem: Patient Care Overview  Goal: Plan of Care Review  Outcome: Ongoing (interventions implemented as appropriate)  Patient aware of plan of care. Patient had a restful night. VS stable. C/o left sided pelvic pain, relieved with hydrocodone x2 and morphine x1 for breakthrough pain. Neuro checks wnl. Ace wrap and sling to left arm intact. daughter at bedside. Turn Q2H. Free from falls/injuries. No questions or concerns at this time. Agrees with plan of care.

## 2018-05-19 PROCEDURE — 11000004 HC SNF PRIVATE

## 2018-05-19 PROCEDURE — A4216 STERILE WATER/SALINE, 10 ML: HCPCS | Performed by: FAMILY MEDICINE

## 2018-05-19 PROCEDURE — 94761 N-INVAS EAR/PLS OXIMETRY MLT: CPT

## 2018-05-19 PROCEDURE — 94799 UNLISTED PULMONARY SVC/PX: CPT

## 2018-05-19 PROCEDURE — 25000242 PHARM REV CODE 250 ALT 637 W/ HCPCS: Performed by: NURSE PRACTITIONER

## 2018-05-19 PROCEDURE — 25000003 PHARM REV CODE 250: Performed by: FAMILY MEDICINE

## 2018-05-19 PROCEDURE — 97110 THERAPEUTIC EXERCISES: CPT

## 2018-05-19 PROCEDURE — 63600175 PHARM REV CODE 636 W HCPCS: Performed by: NURSE PRACTITIONER

## 2018-05-19 PROCEDURE — 97116 GAIT TRAINING THERAPY: CPT

## 2018-05-19 PROCEDURE — 94640 AIRWAY INHALATION TREATMENT: CPT

## 2018-05-19 PROCEDURE — 99900035 HC TECH TIME PER 15 MIN (STAT)

## 2018-05-19 PROCEDURE — 25000003 PHARM REV CODE 250: Performed by: NURSE PRACTITIONER

## 2018-05-19 RX ORDER — SODIUM CHLORIDE 0.9 % (FLUSH) 0.9 %
10 SYRINGE (ML) INJECTION 2 TIMES DAILY
Status: DISCONTINUED | OUTPATIENT
Start: 2018-05-19 | End: 2018-05-29 | Stop reason: HOSPADM

## 2018-05-19 RX ADMIN — PANTOPRAZOLE SODIUM 40 MG: 40 TABLET, DELAYED RELEASE ORAL at 09:05

## 2018-05-19 RX ADMIN — HYDROCODONE BITARTRATE AND ACETAMINOPHEN 1 TABLET: 5; 325 TABLET ORAL at 06:05

## 2018-05-19 RX ADMIN — ENOXAPARIN SODIUM 40 MG: 100 INJECTION SUBCUTANEOUS at 04:05

## 2018-05-19 RX ADMIN — AMLODIPINE BESYLATE 5 MG: 5 TABLET ORAL at 09:05

## 2018-05-19 RX ADMIN — HYDROCODONE BITARTRATE AND ACETAMINOPHEN 1 TABLET: 5; 325 TABLET ORAL at 12:05

## 2018-05-19 RX ADMIN — AMITRIPTYLINE HYDROCHLORIDE 25 MG: 25 TABLET, FILM COATED ORAL at 08:05

## 2018-05-19 RX ADMIN — SODIUM CHLORIDE, PRESERVATIVE FREE 10 ML: 5 INJECTION INTRAVENOUS at 08:05

## 2018-05-19 RX ADMIN — DOCUSATE SODIUM 100 MG: 100 CAPSULE, LIQUID FILLED ORAL at 08:05

## 2018-05-19 RX ADMIN — IPRATROPIUM BROMIDE AND ALBUTEROL SULFATE 3 ML: 2.5; .5 SOLUTION RESPIRATORY (INHALATION) at 06:05

## 2018-05-19 RX ADMIN — DOCUSATE SODIUM 100 MG: 100 CAPSULE, LIQUID FILLED ORAL at 09:05

## 2018-05-19 RX ADMIN — DULOXETINE 30 MG: 30 CAPSULE, DELAYED RELEASE ORAL at 09:05

## 2018-05-19 RX ADMIN — IPRATROPIUM BROMIDE AND ALBUTEROL SULFATE 3 ML: 2.5; .5 SOLUTION RESPIRATORY (INHALATION) at 07:05

## 2018-05-19 RX ADMIN — METOPROLOL SUCCINATE 50 MG: 50 TABLET, EXTENDED RELEASE ORAL at 09:05

## 2018-05-19 RX ADMIN — ATORVASTATIN CALCIUM 20 MG: 20 TABLET, FILM COATED ORAL at 09:05

## 2018-05-19 NOTE — PT/OT/SLP PROGRESS
Physical Therapy Treatment    Patient Name:  Merari Romreo   MRN:  [id number]    Recommendations:     Discharge Recommendations:      Discharge Equipment Recommendations:     Barriers to discharge: None    Assessment:     Merari Romero is a 78 y.o. female admitted with a medical diagnosis of <principal problem not specified>.  She presents with the following impairments/functional limitations:     Gait instability, decreased self care skills.,impaired endurance,, decrease lower extrmieyt function, decreased upper extremity function. Limited function NWB  left lower extremity    Rehab Prognosis:  Good ; patient would benefit from acute skilled PT services to address these deficits and reach maximum level of function.      Recent Surgery: * No surgery found *      Plan:     During this hospitalization, patient to be seen  (1-2x/day(M-F); PRN(Sat.,Sun.)) to address the above listed problems via therapeutic activities, therapeutic exercises, gait training  · Plan of Care Expires:   (Upon D/C from facility)   Plan of Care Reviewed with: patient    Subjective     Communicated with patient and spouse prior to session.  Patient found supine with left leg upon pillow upon PT entry to room, agreeable to treatment.      Chief Complaint: inability to be able to walk at present  Patient comments/goals: to re able to get better and go home  Pain/Comfort:  · Pain Rating 1: 0/10  · Location - Orientation 1: generalized  · Location 1: hip  · Pain Rating Post-Intervention 1: 0/10    Patients cultural, spiritual, Sabianist conflicts given the current situation: none verbalized    Objective:     Patient found with:       General Precautions: Standard, fall   Orthopedic Precautions:LUE non weight bearing, LLE weight bearing as tolerated   Braces:       Functional Mobility:  · Bed Mobility:     · Rolling Left:  moderate assistance  · Rolling Right: moderate assistance  · Scooting: moderate assistance  · Bridging: moderate assistance  · Supine  to Sit: moderate assistance  · Sit to Supine: moderate assistance  · Transfers:     · Sit to Stand:  moderate assistance and hemiwalker  with step transfer  · Gait: 7 steps forward and negotiing back to bed;  Moderate assist, partial wt bearing onto left lower extremity as toleratedl      AM-PAC 6 CLICK MOBILITY          Therapeutic Activities and Exercises:   Instructed and assisted with therapeutic exercises to both lower extremities: to increase strength and funcction,   Focus onto left leg for hip flexion and concurrent knee flexion excursions and return to neutral,   Initiated assitive left leg lifts( SLR)  to increase strength of leg and hip  Transfer training tasks continue to increase self help skills      Patient left supine with all lines intact, call button in reach, NSG notified and spouse present..    GOALS:    Physical Therapy Goals        Problem: Physical Therapy Goal    Goal Priority Disciplines Outcome Goal Variances Interventions   Physical Therapy Goal     PT/OT, PT      Description:  Goals to be met by: 2018     Patient will increase functional independence with mobility by performin. Supine to sit with Stand-by Assistance  2. Sit to supine with Stand-by Assistance  3. Rolling to Left and Right with Stand-by Assistance.  4. Sit to stand transfer with Stand-by Assistance, using bronson-walker  5. Bed to chair transfer with Stand-by Assistance using Bronson-walker  6. Gait  x 50 feet with Stand-by Assistance using Bronson-walker.              Problem: Physical Therapy Goal    Goal Priority Disciplines Outcome Goal Variances Interventions   Physical Therapy Goal     PT/OT, PT      Description:  Ongoing efforts  to achieved previously documented goals                    Time Tracking:     PT Received On: 18  PT Start Time: 827     PT Stop Time: 852  PT Total Time (min): 25 min     Billable Minutes: Gait Training 15 min and Therapeutic Exercise 10 min    Treatment Type: Treatment  PT/PTA:  PT           Viral Lemos, PT  05/19/2018

## 2018-05-19 NOTE — PLAN OF CARE
Problem: Patient Care Overview  Goal: Plan of Care Review  Outcome: Ongoing (interventions implemented as appropriate)  Plan of care reviewed with patient and she agrees with the plan of care. Uneventful night patient rested well. No acute distress noted    Problem: Fall Risk (Adult)  Goal: Absence of Falls  Patient will demonstrate the desired outcomes by discharge/transition of care.   Outcome: Ongoing (interventions implemented as appropriate)  Fall precautions maintained. Bed alarm engaged at all times. Family at bedside.

## 2018-05-20 PROCEDURE — 94640 AIRWAY INHALATION TREATMENT: CPT

## 2018-05-20 PROCEDURE — 25000242 PHARM REV CODE 250 ALT 637 W/ HCPCS: Performed by: NURSE PRACTITIONER

## 2018-05-20 PROCEDURE — 25000003 PHARM REV CODE 250: Performed by: FAMILY MEDICINE

## 2018-05-20 PROCEDURE — 25000003 PHARM REV CODE 250: Performed by: NURSE PRACTITIONER

## 2018-05-20 PROCEDURE — 63600175 PHARM REV CODE 636 W HCPCS: Performed by: NURSE PRACTITIONER

## 2018-05-20 PROCEDURE — A4216 STERILE WATER/SALINE, 10 ML: HCPCS | Performed by: FAMILY MEDICINE

## 2018-05-20 PROCEDURE — 11000004 HC SNF PRIVATE

## 2018-05-20 PROCEDURE — 97110 THERAPEUTIC EXERCISES: CPT

## 2018-05-20 PROCEDURE — 94761 N-INVAS EAR/PLS OXIMETRY MLT: CPT

## 2018-05-20 PROCEDURE — 97116 GAIT TRAINING THERAPY: CPT

## 2018-05-20 RX ADMIN — IPRATROPIUM BROMIDE AND ALBUTEROL SULFATE 3 ML: 2.5; .5 SOLUTION RESPIRATORY (INHALATION) at 06:05

## 2018-05-20 RX ADMIN — HYDROCODONE BITARTRATE AND ACETAMINOPHEN 1 TABLET: 5; 325 TABLET ORAL at 10:05

## 2018-05-20 RX ADMIN — HYDROCODONE BITARTRATE AND ACETAMINOPHEN 1 TABLET: 5; 325 TABLET ORAL at 03:05

## 2018-05-20 RX ADMIN — AMITRIPTYLINE HYDROCHLORIDE 25 MG: 25 TABLET, FILM COATED ORAL at 08:05

## 2018-05-20 RX ADMIN — DOCUSATE SODIUM 100 MG: 100 CAPSULE, LIQUID FILLED ORAL at 08:05

## 2018-05-20 RX ADMIN — DOCUSATE SODIUM 100 MG: 100 CAPSULE, LIQUID FILLED ORAL at 09:05

## 2018-05-20 RX ADMIN — ENOXAPARIN SODIUM 40 MG: 100 INJECTION SUBCUTANEOUS at 05:05

## 2018-05-20 RX ADMIN — DULOXETINE 30 MG: 30 CAPSULE, DELAYED RELEASE ORAL at 09:05

## 2018-05-20 RX ADMIN — AMLODIPINE BESYLATE 5 MG: 5 TABLET ORAL at 09:05

## 2018-05-20 RX ADMIN — ATORVASTATIN CALCIUM 20 MG: 20 TABLET, FILM COATED ORAL at 09:05

## 2018-05-20 RX ADMIN — SODIUM CHLORIDE, PRESERVATIVE FREE 10 ML: 5 INJECTION INTRAVENOUS at 09:05

## 2018-05-20 RX ADMIN — SODIUM CHLORIDE, PRESERVATIVE FREE 10 ML: 5 INJECTION INTRAVENOUS at 08:05

## 2018-05-20 RX ADMIN — METOPROLOL SUCCINATE 50 MG: 50 TABLET, EXTENDED RELEASE ORAL at 09:05

## 2018-05-20 RX ADMIN — PANTOPRAZOLE SODIUM 40 MG: 40 TABLET, DELAYED RELEASE ORAL at 09:05

## 2018-05-20 RX ADMIN — MORPHINE SULFATE 2 MG: 4 INJECTION INTRAVENOUS at 06:05

## 2018-05-20 NOTE — PLAN OF CARE
Problem: Patient Care Overview  Goal: Plan of Care Review  Outcome: Ongoing (interventions implemented as appropriate)  Plan of care reviewed with patient and she agrees with the plan of care. Hydrocodone effective for c/o left hip pain. Uneventful night patient rested well.     Problem: Fall Risk (Adult)  Goal: Absence of Falls  Patient will demonstrate the desired outcomes by discharge/transition of care.   Outcome: Ongoing (interventions implemented as appropriate)  Fall precautions maintained. Bed alarm engaged at all times. Family at bedside.

## 2018-05-20 NOTE — PT/OT/SLP PROGRESS
Physical Therapy Treatment    Patient Name:  Merari Romero   MRN:  [id number]    Recommendations:     Discharge Recommendations:      Discharge Equipment Recommendations: walker, flora   Barriers to discharge: None    Assessment:     Merari Romero is a 78 y.o. female admitted with a medical diagnosis of <principal problem not specified>.  She presents with the following impairments/functional limitations:    weakness, fractured  left radius and pelvis Fx.    Rehab Prognosis:  good; patient would benefit from acute skilled PT services to address these deficits and reach maximum level of function.      Recent Surgery: * No surgery found *      Plan:     During this hospitalization, patient to be seen  (1-2x/day(M-F); PRN(Sat.,Sun.)) to address the above listed problems via therapeutic activities, therapeutic exercises, gait training  · Plan of Care Expires:   (Upon D/C from facility)   Plan of Care Reviewed with: patient, spouse, daughter    Subjective     Communicated with patient, spouse, and daughter prior to session.  Patient found up in wheelchair upon PT entry to room, agreeable to treatment.      Chief Complaint: slight pain left pelvis    Patient comments/goals: determined to get better  Pain/Comfort:  · Pain Rating 1: 1/10  · Location - Side 1: Left  · Location - Orientation 1: generalized  · Location 1: hip  · Pain Addressed 1: Reposition  · Pain Rating Post-Intervention 1: 1/10    Patients cultural, spiritual, Congregational conflicts given the current situation: none verbalized    Objective:     Patient found with:       General Precautions: Standard, fall   Orthopedic Precautions:LUE non weight bearing, LLE weight bearing as tolerated   Braces:       Functional Mobility:  · Transfers:     · Sit to Stand:  minimum assistance with rolling walker  · Gait: Completed 20 ft of gait- with hemiwalker, minimal assist for tactile and verbal cues  and wt bearing onto left lower extremty as tolerated - noting progress with  wt  bearing      AM-PAC 6 CLICK MOBILITY          Therapeutic Activities and Exercises:   Transfer training with increased patient participation- bed mobility  To sit, stand and transfer into wheelchair out of OOB time    Initiating shoulder exercises - activities. Active excursions to facilitate Left upper extremity function and strength  with precautions for left elbow Fracture.    Patient left up in wheelchair with family present..    GOALS:    Physical Therapy Goals        Problem: Physical Therapy Goal    Goal Priority Disciplines Outcome Goal Variances Interventions   Physical Therapy Goal     PT/OT, PT      Description:  Goals to be met by: 2018     Patient will increase functional independence with mobility by performin. Supine to sit with Stand-by Assistance  2. Sit to supine with Stand-by Assistance  3. Rolling to Left and Right with Stand-by Assistance.  4. Sit to stand transfer with Stand-by Assistance, using bronson-walker  5. Bed to chair transfer with Stand-by Assistance using Bronson-walker  6. Gait  x 50 feet with Stand-by Assistance using Bronson-walker.              Problem: Physical Therapy Goal    Goal Priority Disciplines Outcome Goal Variances Interventions   Physical Therapy Goal     PT/OT, PT      Description:  Ongoing efforts  to achieved previously documented goals           Problem: Physical Therapy Goal    Goal Priority Disciplines Outcome Goal Variances Interventions   Physical Therapy Goal     PT/OT, PT      Description:  1. Pt will demonstrate hemiwalker use for 25 ft - WBAT Left lower extremiity with minimal assistance for safety                    Time Tracking:     PT Received On: 18  PT Start Time: 0800     PT Stop Time: 0825  PT Total Time (min): 25 min     Billable Minutes: Gait Training 15 min, Therapeutic Exercise 10 min and Total Time 25 min    Treatment Type: Treatment  PT/PTA: PT           Viral Lemos, PT  2018

## 2018-05-21 LAB
BASOPHILS # BLD AUTO: 0.07 K/UL
BASOPHILS NFR BLD: 1.3 %
DIFFERENTIAL METHOD: ABNORMAL
EOSINOPHIL # BLD AUTO: 0.4 K/UL
EOSINOPHIL NFR BLD: 6.5 %
ERYTHROCYTE [DISTWIDTH] IN BLOOD BY AUTOMATED COUNT: 14.4 %
HCT VFR BLD AUTO: 38.4 %
HGB BLD-MCNC: 12.6 G/DL
LYMPHOCYTES # BLD AUTO: 1.6 K/UL
LYMPHOCYTES NFR BLD: 29 %
MCH RBC QN AUTO: 29.1 PG
MCHC RBC AUTO-ENTMCNC: 32.8 G/DL
MCV RBC AUTO: 89 FL
MONOCYTES # BLD AUTO: 0.4 K/UL
MONOCYTES NFR BLD: 6.5 %
NEUTROPHILS # BLD AUTO: 3.2 K/UL
NEUTROPHILS NFR BLD: 56.7 %
PLATELET # BLD AUTO: 389 K/UL
PMV BLD AUTO: 10.1 FL
RBC # BLD AUTO: 4.33 M/UL
WBC # BLD AUTO: 5.56 K/UL

## 2018-05-21 PROCEDURE — 85025 COMPLETE CBC W/AUTO DIFF WBC: CPT

## 2018-05-21 PROCEDURE — 25000003 PHARM REV CODE 250: Performed by: NURSE PRACTITIONER

## 2018-05-21 PROCEDURE — 97530 THERAPEUTIC ACTIVITIES: CPT

## 2018-05-21 PROCEDURE — 25000242 PHARM REV CODE 250 ALT 637 W/ HCPCS: Performed by: NURSE PRACTITIONER

## 2018-05-21 PROCEDURE — 63600175 PHARM REV CODE 636 W HCPCS: Performed by: NURSE PRACTITIONER

## 2018-05-21 PROCEDURE — 94761 N-INVAS EAR/PLS OXIMETRY MLT: CPT

## 2018-05-21 PROCEDURE — 11000004 HC SNF PRIVATE

## 2018-05-21 PROCEDURE — 97803 MED NUTRITION INDIV SUBSEQ: CPT

## 2018-05-21 PROCEDURE — 36415 COLL VENOUS BLD VENIPUNCTURE: CPT

## 2018-05-21 PROCEDURE — 94640 AIRWAY INHALATION TREATMENT: CPT

## 2018-05-21 PROCEDURE — 97116 GAIT TRAINING THERAPY: CPT

## 2018-05-21 PROCEDURE — 99232 SBSQ HOSP IP/OBS MODERATE 35: CPT | Mod: ,,, | Performed by: INTERNAL MEDICINE

## 2018-05-21 PROCEDURE — A4216 STERILE WATER/SALINE, 10 ML: HCPCS | Performed by: FAMILY MEDICINE

## 2018-05-21 PROCEDURE — 25000003 PHARM REV CODE 250: Performed by: FAMILY MEDICINE

## 2018-05-21 RX ORDER — MUPIROCIN 20 MG/G
OINTMENT TOPICAL DAILY
Status: DISCONTINUED | OUTPATIENT
Start: 2018-05-21 | End: 2018-05-29 | Stop reason: HOSPADM

## 2018-05-21 RX ADMIN — PANTOPRAZOLE SODIUM 40 MG: 40 TABLET, DELAYED RELEASE ORAL at 09:05

## 2018-05-21 RX ADMIN — ATORVASTATIN CALCIUM 20 MG: 20 TABLET, FILM COATED ORAL at 09:05

## 2018-05-21 RX ADMIN — SODIUM CHLORIDE, PRESERVATIVE FREE 10 ML: 5 INJECTION INTRAVENOUS at 09:05

## 2018-05-21 RX ADMIN — METOPROLOL SUCCINATE 50 MG: 50 TABLET, EXTENDED RELEASE ORAL at 09:05

## 2018-05-21 RX ADMIN — HYDROCODONE BITARTRATE AND ACETAMINOPHEN 1 TABLET: 5; 325 TABLET ORAL at 06:05

## 2018-05-21 RX ADMIN — MORPHINE SULFATE 2 MG: 4 INJECTION INTRAVENOUS at 09:05

## 2018-05-21 RX ADMIN — MUPIROCIN: 20 OINTMENT TOPICAL at 09:05

## 2018-05-21 RX ADMIN — DOCUSATE SODIUM 100 MG: 100 CAPSULE, LIQUID FILLED ORAL at 09:05

## 2018-05-21 RX ADMIN — HYDROCODONE BITARTRATE AND ACETAMINOPHEN 1 TABLET: 5; 325 TABLET ORAL at 02:05

## 2018-05-21 RX ADMIN — IPRATROPIUM BROMIDE AND ALBUTEROL SULFATE 3 ML: 2.5; .5 SOLUTION RESPIRATORY (INHALATION) at 07:05

## 2018-05-21 RX ADMIN — IPRATROPIUM BROMIDE AND ALBUTEROL SULFATE 3 ML: 2.5; .5 SOLUTION RESPIRATORY (INHALATION) at 06:05

## 2018-05-21 RX ADMIN — AMITRIPTYLINE HYDROCHLORIDE 25 MG: 25 TABLET, FILM COATED ORAL at 09:05

## 2018-05-21 RX ADMIN — MORPHINE SULFATE 2 MG: 4 INJECTION INTRAVENOUS at 04:05

## 2018-05-21 RX ADMIN — ENOXAPARIN SODIUM 40 MG: 100 INJECTION SUBCUTANEOUS at 04:05

## 2018-05-21 RX ADMIN — HYDROCODONE BITARTRATE AND ACETAMINOPHEN 1 TABLET: 5; 325 TABLET ORAL at 10:05

## 2018-05-21 RX ADMIN — DULOXETINE 30 MG: 30 CAPSULE, DELAYED RELEASE ORAL at 09:05

## 2018-05-21 RX ADMIN — AMLODIPINE BESYLATE 5 MG: 5 TABLET ORAL at 09:05

## 2018-05-21 NOTE — ASSESSMENT & PLAN NOTE
Ortho consulted via ER, non surgical orthopedic injuries per Dr Doc Wolf, non wt bearing injury to left upper ext Splinted   She also can wt bear as tolerated to left lower ext   Bo Urbina NP with ortho came 5/16, no changes, f/u Aminta 3 weeks for repeat films  Undressed splint 5/21 to assess skin tears, healing well, no s/s infection. Will undress biweekly and apply bactroban

## 2018-05-21 NOTE — PROGRESS NOTES
[facility] Hospital Medicine  Progress Note    Patient Name: Merari Romero  MRN: [id number]  Patient Class: IP- Swing   Admission Date: 5/17/2018  Length of Stay: 4 days  Attending Physician: Michael Chacon MD  Primary Care Provider: Shaun Barraza MD        Subjective:     Principal Problem:<principal problem not specified>    HPI:  5/18/18 SWING ADMIT  78 year old female admitted 5/11/18 for Closed nondisplaced fracture of neck of left radius after a falll going up stairs, slipped on first step.. She was admitted for pain control and PT. She is progressing with PT but slow. Did not qualify for inpatient rehab as can not tolerate 3hr of activity.    SWING placement yesterday for continued PT/OT . She was living at home with  prior to fall and able to take care of self and him.  Patient had a restful night. VS stable. Some  C/o left sided pelvic pain, relieved with hydrocodone x2 and morphine x1 for breakthrough pain. Ace wrap and sling to left arm intact. She reports she tolerated PT well yesterday.  Encouraged continue IS.    and daughter at bedside, supportive. Pt. amb. 10' with hemiwalker, mod. A. No c/o SOB or fevers this morning.    Hospital Course:  She is doing well. Skin tears healing well. She is ambulating 20 ft with PT using rolling walker. Her goal is 50 ft. She has only used norco PO over last 24hr to control her pain. She remains on RA with POX 93-96%. Has IS at bedside. Using PRN. No fever. On lovonox SQ for DVT prophylaxis. Daughter at bedside reports that they would like to take her home rather than send her to INPT rehab if she is strong enough by D/c    Interval History: see      Review of Systems   Constitutional: Negative for chills and fever.   Respiratory: Negative for shortness of breath.    Cardiovascular: Negative for chest pain and palpitations.   Gastrointestinal: Negative for abdominal pain, blood in stool, constipation and nausea.   Genitourinary:  Negative for difficulty urinating and pelvic pain.   Musculoskeletal: Positive for arthralgias and myalgias.        Left hip pain with moving as well as left arm pain with moving   Skin:        Left middle toe bruised   Psychiatric/Behavioral: Negative for dysphoric mood, sleep disturbance and suicidal ideas. The patient is not nervous/anxious.      Objective:     Vital Signs (Most Recent):  Temp: 97.1 °F (36.2 °C) (05/21/18 0314)  Pulse: 76 (05/21/18 0656)  Resp: 18 (05/21/18 0656)  BP: 126/80 (05/21/18 0314)  SpO2: 96 % (05/21/18 0656) Vital Signs (24h Range):  Temp:  [96.3 °F (35.7 °C)-97.3 °F (36.3 °C)] 97.1 °F (36.2 °C)  Pulse:  [70-82] 76  Resp:  [17-20] 18  SpO2:  [92 %-96 %] 96 %  BP: (116-141)/(63-80) 126/80     Weight: 64 kg (141 lb 1.5 oz) (rd reviewed)  Body mass index is 24.99 kg/m².    Intake/Output Summary (Last 24 hours) at 05/21/18 0744  Last data filed at 05/20/18 1700   Gross per 24 hour   Intake              840 ml   Output                0 ml   Net              840 ml      Physical Exam   Constitutional: She is oriented to person, place, and time. She appears well-developed. No distress.   HENT:   Head: Normocephalic and atraumatic.   Eyes: Conjunctivae are normal. Pupils are equal, round, and reactive to light.   Neck: Normal range of motion. Neck supple. No thyromegaly present.   Cardiovascular: Normal rate, regular rhythm, normal heart sounds and intact distal pulses.    Pulmonary/Chest: Effort normal and breath sounds normal. No respiratory distress. She has no wheezes.   Abdominal: Soft. Bowel sounds are normal. There is no tenderness.   Musculoskeletal: She exhibits tenderness (hip bilaterally). She exhibits no edema.   Left sling in place   Lymphadenopathy:     She has no cervical adenopathy.   Neurological: She is alert and oriented to person, place, and time.   Skin: Skin is warm and dry. No rash noted.   Psychiatric: She has a normal mood and affect. Her behavior is normal.   Nursing  note and vitals reviewed.      Significant Labs:   Lab Results   Component Value Date    WBC 8.02 05/17/2018    HGB 11.7 (L) 05/17/2018    HCT 35.2 (L) 05/17/2018    MCV 88 05/17/2018     05/17/2018     BMP  Lab Results   Component Value Date     (L) 05/14/2018    K 3.9 05/14/2018    CL 98 05/14/2018    CO2 26 05/14/2018    BUN 8 05/14/2018    CREATININE 0.7 05/17/2018    CALCIUM 8.5 (L) 05/14/2018    ANIONGAP 8 05/14/2018    ESTGFRAFRICA >60 05/17/2018    EGFRNONAA >60 05/17/2018     Lab Results   Component Value Date    ALT 21 05/14/2018    AST 30 05/14/2018    ALKPHOS 83 05/14/2018    BILITOT 0.9 05/14/2018     Lab Results   Component Value Date    INR 1.1 05/11/2018     Urinalysis     Recent Labs  Lab 05/14/18  1142   COLORU Yellow   SPECGRAV 1.010   PHUR 7.0   PROTEINUA Negative   NITRITE Negative   LEUKOCYTESUR Negative   UROBILINOGEN 1.0   Urine culture no growth      Significant Imaging:     Left hip x ray No definite evidence of a displaced fracture or dislocation.    The symphysis pubis is limited to evaluation due to angulation and positioning and a pubis fracture cannot be excluded..    CXR 2.3 cm confluence of shadows overlying the left upper lobe.  Consider nonurgent follow-up chest with lateral for further evaluation  .  REPEAT CXR   Chronic lung changes are seen bilaterally.  Focal region of scarring is seen in the left suprahilar location, less prominent as compared to the previous study of 05/11/2018.  No consolidation or pleural effusions.  The heart is normal in size.  Calcified atheromatous disease affects the aorta.  The bones are osteopenic and show age-appropriate degenerative change.    Left elbow x ray There is a fracture involving the radial neck.  Posterior fat pad is present compatible with an effusion.    Left hip x rays repeat  There are fractures involving the left symphysis pubis and the inferior ramus which were limited to evaluation on the prior study.    No evidence  of a fracture of the left hip.  No dislocation.  Vascular calcifications overlying the left femoral head and neck.        CRANIAL NERVES     CN III, IV, VI   Pupils are equal, round, and reactive to light.        Assessment/Plan:      Inadequate dietary energy intake              Debility      Continue PT/OT  IS encouraged.         Fall    Fear of falling. Cont with PT/OT          Urinary hesitancy    U/ a and culture- all normal  Better now that she can get up to BEDS SIDE commode          History of deep venous thrombosis      lovenox 40 daily due to hx of DVT and immobility        Closed fracture of left pubis    Non operative   Pain control try and cont po as much as poss  Try and reduce risk of associated complications of limited mobility(IS, nebs, PT)  Discussed with Bo GONZALEZ ortho. Seen patient 5/16, cont non wt bearing to left upper ext with long arm splint and wt bearing as tolerated to left lower ext. F/u Dr Lemos 3 weeks for repeat films  Cont PT till safe for D/C to home        Closed nondisplaced fracture of neck of left radius    Ortho consulted via ER, non surgical orthopedic injuries per Dr Doc Wolf, non wt bearing injury to left upper ext Splinted   She also can wt bear as tolerated to left lower ext   Bo Urbina NP with ortho came 5/16, no changes, f/u Aminta 3 weeks for repeat films  Undressed splint 5/21 to assess skin tears, healing well, no s/s infection. Will undress biweekly and apply bactroban            VTE Risk Mitigation         Ordered     enoxaparin injection 40 mg  Daily      05/17/18 1159     IP VTE HIGH RISK PATIENT  Once      05/17/18 1158     Place sequential compression device  Until discontinued      05/17/18 1158              Amol Campbell MD  Department of Hospital Medicine   [facility]

## 2018-05-21 NOTE — PLAN OF CARE
Skilled level patient care conference done. All disciplines agree. Patient progress seems slow due to fatigue. Patient encouraged to spend the majority of her day out of bed, and increase length of ambulation with each session. She voices understanding.

## 2018-05-21 NOTE — ASSESSMENT & PLAN NOTE
Non operative   Pain control try and cont po as much as poss  Try and reduce risk of associated complications of limited mobility(IS, nebs, PT)  Discussed with Bo GONZALEZ ortho. Seen patient 5/16, cont non wt bearing to left upper ext with long arm splint and wt bearing as tolerated to left lower ext. F/u Dr Lemos 3 weeks for repeat films  Cont PT till safe for D/C to home

## 2018-05-21 NOTE — PT/OT/SLP PROGRESS
"Physical Therapy Treatment    Patient Name:  Merari Romero   MRN:  [id number]    Recommendations:     Discharge Recommendations:  rehabilitation facility   Discharge Equipment Recommendations: wheelchair, walker, flora, bedside commode   Barriers to discharge: None    Assessment:     Merari Romero is a 78 y.o. female admitted with a medical diagnosis of <principal problem not specified>.  She presents with the following impairments/functional limitations:  weakness, impaired endurance, impaired self care skills, impaired functional mobilty, gait instability, impaired balance, decreased upper extremity function, decreased lower extremity function, decreased safety awareness, pain . Pt progressing well with POC goals. Pt with 10/10 pain in left hip with attempts at ambulation. Pt with difficulty putting weight on LLE and continues with increased left hip flexion weakness.    Rehab Prognosis:  Good; patient would benefit from acute skilled PT services to address these deficits and reach maximum level of function.      Recent Surgery: * No surgery found *      Plan:     During this hospitalization, patient to be seen  (1-2x/day Monday-Friday; PRN Weekends/holidays) to address the above listed problems via therapeutic activities, gait training, therapeutic exercises  · Plan of Care Expires:   (upon d/C from facility)   Plan of Care Reviewed with: patient, spouse    Subjective     Communicated with patient prior to session.  Patient found supine in bed upon PT entry to room, agreeable to treatment.      Chief Complaint: "My hip just hurts when I stand on it. I want to do so much more than I am doing."  Patient comments/goals: "Get stronger and walk again on my own."  Pain/Comfort:  · Pain Rating 1: 5/10  · Location - Side 1: Left  · Location - Orientation 1: generalized  · Location 1: hip  · Pain Addressed 1: Nurse notified, Pre-medicate for activity  · Pain Rating Post-Intervention 1: 5/10    Patients cultural, spiritual, " Restoration conflicts given the current situation:      Objective:     Patient found with:       General Precautions: Standard, fall   Orthopedic Precautions:LUE non weight bearing, LLE weight bearing as tolerated   Braces: N/A (LUE soft cast)     Functional Mobility:  · Bed Mobility:     · Rolling Left:  stand by assistance  · Rolling Right: stand by assistance  · Supine to Sit: stand by assistance  · Sit to Supine: stand by assistance  · Transfers:     · Sit to Stand:  contact guard assistance with hemiwalker  · Bed to Chair: contact guard assistance with  hemiwalker  using  Step Transfer  · Gait: Gait training x 15 feet with hemiwalker and mod assist with cueing for gait sequencing and proper use of AD to decrease pt fall risk      AM-PAC 6 CLICK MOBILITY  Turning over in bed (including adjusting bedclothes, sheets and blankets)?: 3  Sitting down on and standing up from a chair with arms (e.g., wheelchair, bedside commode, etc.): 3  Moving from lying on back to sitting on the side of the bed?: 3  Moving to and from a bed to a chair (including a wheelchair)?: 3  Need to walk in hospital room?: 2  Climbing 3-5 steps with a railing?: 1  Total Score: 15       Therapeutic Activities and Exercises:   Pt performed BLE therapeutic exercises at all joints in available planes of motion with A/A to LLE with rest breaks as needed to increase strength and endurance with all gross mobility skills.     Patient left up in chair with all lines intact, call button in reach, NSG notified and spouse present..    GOALS:    Physical Therapy Goals        Problem: Physical Therapy Goal    Goal Priority Disciplines Outcome Goal Variances Interventions   Physical Therapy Goal     PT/OT, PT      Description:  Goals to be met by: 2018     Patient will increase functional independence with mobility by performin. Supine to sit with Stand-by Assistance  2. Sit to supine with Stand-by Assistance  3. Rolling to Left and Right with  Stand-by Assistance.  4. Sit to stand transfer with Stand-by Assistance, using bronson-walker  5. Bed to chair transfer with Stand-by Assistance using Bronson-walker  6. Gait  x 50 feet with Stand-by Assistance using Bronson-walker.              Problem: Physical Therapy Goal    Goal Priority Disciplines Outcome Goal Variances Interventions   Physical Therapy Goal     PT/OT, PT      Description:  Ongoing efforts  to achieved previously documented goals           Problem: Physical Therapy Goal    Goal Priority Disciplines Outcome Goal Variances Interventions   Physical Therapy Goal     PT/OT, PT      Description:  1. Pt will demonstrate hemiwalker use for 25 ft - WBAT Left lower extremiity with minimal assistance for safety                    Time Tracking:     PT Received On: 05/21/18  PT Start Time: 0950     PT Stop Time: 1013  PT Total Time (min): 23 min     Billable Minutes: Gait Training 13 minutes and Therapeutic Activity 10 minutes    Treatment Type: Treatment  PT/PTA: PT           Paige Ramirez, PT  05/21/2018

## 2018-05-21 NOTE — PLAN OF CARE
Problem: Patient Care Overview  Goal: Plan of Care Review  Outcome: Ongoing (interventions implemented as appropriate)  Patient following POC, NADN, VSS, free from falls/injuries during shift, pain managed with PRN meds, CB in reach, instructed to call with needs, verbalizes understanding.

## 2018-05-21 NOTE — PT/OT/SLP PROGRESS
Occupational Therapy   Treatment    Name: Merari Romero  MRN: [id number]  Admitting Diagnosis:  <principal problem not specified>       Recommendations:     Discharge Recommendations:  (recommend inpatient rehab)  Discharge Equipment Recommendations:  bedside commode, bath bench    Subjective     Communicated with: chart review and PCT at desk to request pain medication after speaking with aptient prior to session.  Pain/Comfort:  · Pain Rating 1: 7/10  · Location - Side 1: Left  · Location 1: hip  · Pain Addressed 1: Nurse notified  · Pain Rating Post-Intervention 1:  (same)    Patients cultural, spiritual, Restoration conflicts given the current situation:  (none verbalized)    Objective:     Patient found with:  (sitting in bedside chair on OT arrival)    General Precautions: Standard, fall   Orthopedic Precautions:LUE non weight bearing, LLE weight bearing as tolerated   Braces:  (left UE long arm soft cast)     Occupational Performance:    Bed Mobility:    · Patient completed Supine to Sit with minimum assistance   · Pt reported she was able to complete activity on her own in a.m., however she was in increased pain this p.m.    Activities of Daily Living:  · UB Dressing: minimum assistance increased time and verbal cues  · LB Dressing: raúl/doff socks with min A and verbal cues with sock aid and reacher    Patient left sitting edge of bed with all lines intact, call button in reach, patient's daughter present and physical therapy coming in to see patient  Education:    Assessment:     Merari Romero is a 78 y.o. female with a medical diagnosis of <principal problem not specified>.  She presents with good motivation to return to her PLOF of independence.  Performance deficits affecting function are weakness, impaired endurance, pain, impaired self care skills, impaired functional mobilty, decreased upper extremity function. Patient demonstrating progress with bed mobility as well as dressing.    Rehab Prognosis:  good;  patient would benefit from acute skilled OT services to address these deficits and reach maximum level of function.       Plan:     Patient to be seen 3 x/week, 5 x/week to address the above listed problems via self-care/home management, therapeutic activities, therapeutic exercises, neuromuscular re-education  · Plan of Care Expires: upon discharge  · Plan of Care Reviewed with: patient    This Plan of care has been discussed with the patient who was involved in its development and understands and is in agreement with the identified goals and treatment plan    GOALS:    Occupational Therapy Goals        Problem: Occupational Therapy Goal    Goal Priority Disciplines Outcome Interventions   Occupational Therapy Goal     OT, PT/OT Ongoing (interventions implemented as appropriate)    Description:  Goals to be met by: 05/24/2018     Patient will increase functional independence with ADLs by performing:    UE Dressing to be assessed (goal met 5/18/2018)  LE Dressing with Minimal Assistance. (goal partially met with socks)  Toilet transfer to bedside commode with Contact Guard Assistance.    Updated goals 5/21/2018: Will continue to work on goals 2 and 3 above and add  UE dressing with SBA                       Time Tracking:     OT Date of Treatment: 05/21/18  OT Start Time: 1350  OT Stop Time: 1405  OT Total Time (min): 15 min    Billable Minutes:Therapeutic Activity 15 min    LEYLA Plascencia  5/21/2018

## 2018-05-21 NOTE — SUBJECTIVE & OBJECTIVE
Interval History: see      Review of Systems   Constitutional: Negative for chills and fever.   Respiratory: Negative for shortness of breath.    Cardiovascular: Negative for chest pain and palpitations.   Gastrointestinal: Negative for abdominal pain, blood in stool, constipation and nausea.   Genitourinary: Negative for difficulty urinating and pelvic pain.   Musculoskeletal: Positive for arthralgias and myalgias.        Left hip pain with moving as well as left arm pain with moving   Skin:        Left middle toe bruised   Psychiatric/Behavioral: Negative for dysphoric mood, sleep disturbance and suicidal ideas. The patient is not nervous/anxious.      Objective:     Vital Signs (Most Recent):  Temp: 97.1 °F (36.2 °C) (05/21/18 0314)  Pulse: 76 (05/21/18 0656)  Resp: 18 (05/21/18 0656)  BP: 126/80 (05/21/18 0314)  SpO2: 96 % (05/21/18 0656) Vital Signs (24h Range):  Temp:  [96.3 °F (35.7 °C)-97.3 °F (36.3 °C)] 97.1 °F (36.2 °C)  Pulse:  [70-82] 76  Resp:  [17-20] 18  SpO2:  [92 %-96 %] 96 %  BP: (116-141)/(63-80) 126/80     Weight: 64 kg (141 lb 1.5 oz) (rd reviewed)  Body mass index is 24.99 kg/m².    Intake/Output Summary (Last 24 hours) at 05/21/18 0744  Last data filed at 05/20/18 1700   Gross per 24 hour   Intake              840 ml   Output                0 ml   Net              840 ml      Physical Exam   Constitutional: She is oriented to person, place, and time. She appears well-developed. No distress.   HENT:   Head: Normocephalic and atraumatic.   Eyes: Conjunctivae are normal. Pupils are equal, round, and reactive to light.   Neck: Normal range of motion. Neck supple. No thyromegaly present.   Cardiovascular: Normal rate, regular rhythm, normal heart sounds and intact distal pulses.    Pulmonary/Chest: Effort normal and breath sounds normal. No respiratory distress. She has no wheezes.   Abdominal: Soft. Bowel sounds are normal. There is no tenderness.   Musculoskeletal: She exhibits tenderness (hip  bilaterally). She exhibits no edema.   Left sling in place   Lymphadenopathy:     She has no cervical adenopathy.   Neurological: She is alert and oriented to person, place, and time.   Skin: Skin is warm and dry. No rash noted.   Psychiatric: She has a normal mood and affect. Her behavior is normal.   Nursing note and vitals reviewed.      Significant Labs:   Lab Results   Component Value Date    WBC 8.02 05/17/2018    HGB 11.7 (L) 05/17/2018    HCT 35.2 (L) 05/17/2018    MCV 88 05/17/2018     05/17/2018     BMP  Lab Results   Component Value Date     (L) 05/14/2018    K 3.9 05/14/2018    CL 98 05/14/2018    CO2 26 05/14/2018    BUN 8 05/14/2018    CREATININE 0.7 05/17/2018    CALCIUM 8.5 (L) 05/14/2018    ANIONGAP 8 05/14/2018    ESTGFRAFRICA >60 05/17/2018    EGFRNONAA >60 05/17/2018     Lab Results   Component Value Date    ALT 21 05/14/2018    AST 30 05/14/2018    ALKPHOS 83 05/14/2018    BILITOT 0.9 05/14/2018     Lab Results   Component Value Date    INR 1.1 05/11/2018     Urinalysis     Recent Labs  Lab 05/14/18  1142   COLORU Yellow   SPECGRAV 1.010   PHUR 7.0   PROTEINUA Negative   NITRITE Negative   LEUKOCYTESUR Negative   UROBILINOGEN 1.0   Urine culture no growth      Significant Imaging:     Left hip x ray No definite evidence of a displaced fracture or dislocation.    The symphysis pubis is limited to evaluation due to angulation and positioning and a pubis fracture cannot be excluded..    CXR 2.3 cm confluence of shadows overlying the left upper lobe.  Consider nonurgent follow-up chest with lateral for further evaluation  .  REPEAT CXR   Chronic lung changes are seen bilaterally.  Focal region of scarring is seen in the left suprahilar location, less prominent as compared to the previous study of 05/11/2018.  No consolidation or pleural effusions.  The heart is normal in size.  Calcified atheromatous disease affects the aorta.  The bones are osteopenic and show age-appropriate  degenerative change.    Left elbow x ray There is a fracture involving the radial neck.  Posterior fat pad is present compatible with an effusion.    Left hip x rays repeat  There are fractures involving the left symphysis pubis and the inferior ramus which were limited to evaluation on the prior study.    No evidence of a fracture of the left hip.  No dislocation.  Vascular calcifications overlying the left femoral head and neck.        CRANIAL NERVES     CN III, IV, VI   Pupils are equal, round, and reactive to light.

## 2018-05-21 NOTE — HOSPITAL COURSE
5/21/18  She is doing well. Skin tears healing well. She is ambulating 20 ft with PT using rolling walker. Her goal is 50 ft. She has only used norco PO over last 24hr to control her pain. She remains on RA with POX 93-96%. Has IS at bedside. Using PRN. No fever. On lovonox SQ for DVT prophylaxis. Daughter at bedside reports that they would like to take her home rather than send her to INPT rehab if she is strong enough by D/c    5/23/18  Yesterday did well with therapy. Using stand by assist she was able to do all bed mobility, and used contact guard assist to transfer from sit to stand and ambulated with the same assist using flora walker 15 ft. This is better than the weekend where she was still requiring min assist per PT. She is progressing towards her goal of stand by assist 50ft using flora walker.     She has not required morphine for breakthrough pain since the 5/21/18. Using norco PRN for pain.     VSS/afebrile, maintaining sats on RA, IS at bedside and nebs BID. lovenox daily     C/o left foot pain today. She reports that the the top of foot to her knee. Has hx of arthritis. Just started yesterday Pain meds help the pain in foot as well as hip  5/24/18 Brief MD; pt with c/o persistent Left LE pain; tender and hurts with movement; also notes left hip pain with rotation. She cannot dorsiflex without pain and it is limiting her ability to ambulate/participate in PT  5/25/18 Pt still with c/o left LE pain; US negative for DVT; symptoms sound consistent with plantar/ dorsal fascitis. Pain under heel and to top of foot; worse when she just starts to walk after getting out of bed  One toe with slight redness. Notes hx of neuropathy but stopped taking her meds.   She does have pain to left hip with rotation but has + fractures involving the left symphysis pubis and the inferior ramus.     5/28/18 She has been on SWING for 11 days. She was doing well then started with left ankle and foot pain. She is no longer  ambulating with PT due to left foot and ankle pain. U/s completed negative for DVT. Uric acid 4.6. She was given NSAID without relief. Dr Ramirez on call today for ortho. Nurse left voice mail with him for eval of patients foot/ankle pain hindering her rehab. X rays ordered. She also is back to using IV pain meds when she had weaned to po only    5/29/18. She was seen by ortho again yesterday for her complaints of left leg pain. X rays without acute issues. She has hx of back issues. Lumbar x rays do show grade 1 anterolisthesis of L5-S1. She reports that she feels better this am after receiving decadron over night. She is willing to try and walk with PT today.   If successful, she will be discharged home.

## 2018-05-21 NOTE — PLAN OF CARE
Problem: Patient Care Overview  Goal: Plan of Care Review  Outcome: Ongoing (interventions implemented as appropriate)  Pt remains on swing bed for continued PT and OT. Ambulating with walker and assist; hasnt met goals yet. Occasional complaints of pain; relieved with PO medications and IV for break through pain. Left arm in soft cast; bactroban placed to left arm skin tear and dressing and soft cast replaced. Neuro checks wnl. Vitals stable. Call bell within reach. Reviewed plan of care with pt and daughter, state agreement.

## 2018-05-21 NOTE — NURSING
Report received from Kimberley NUR Pt lying in bed drinking coffee with daughter at bedside. Left arm in soft cast. Heels elevated off bed using pillows. Vitals stable. Call bell within reach. Denies needs at this time.

## 2018-05-21 NOTE — ASSESSMENT & PLAN NOTE
Related to (etiology):   Decreased appetite    Signs and Symptoms (as evidenced by):   ~50% intake at this time    Interventions/Recommendations (treatment strategy):  Continue Low Sodium diet  Boost Plus BID  RD to monitor    Nutrition Diagnosis Status:   Other: Revised

## 2018-05-21 NOTE — PLAN OF CARE
Problem: Patient Care Overview  Goal: Plan of Care Review  Outcome: Ongoing (interventions implemented as appropriate)  Nutrition Goals: adequate po intake >=75% by discharge  Nutrition Goal Status: progressing towards goal  Communication of RD Recs: discussed on rounds    Nutrition Discharge Planning: Low Sodium diet with adequate intake to meet EEN & EPN

## 2018-05-21 NOTE — PROGRESS NOTES
" [facility]  Adult Nutrition  Progress Note    SUMMARY       Recommendations    Recommendation/Intervention: Continue Low Sodium diet as tolerated encouraging good intake of all meals. Add Boost Plus BID with breakfast and supper.  Goals: adequate po intake >=75% by discharge  Nutrition Goal Status: progressing towards goal  Communication of RD Recs: discussed on rounds    Nutrition Discharge Planning: Low Sodium diet with adequate intake to meet EEN & EPN    Reason for Assessment    Reason for Assessment: RD follow-up  Diagnosis:  (fracture of left radius)  Relevant Medical History: HTN, DVT, Osteoporosis  Interdisciplinary Rounds: attended  General Information Comments: Pt with slow progress; Pt with fair appetite averaging ~50%.       Nutrition Risk Screen    Nutrition Risk Screen: no indicators present    Nutrition/Diet History    Patient Reported Diet/Restrictions/Preferences: low salt  Do you have any cultural, spiritual, Confucianist conflicts, given your current situation?:  (none verbalized)  Food Allergies: NKFA  Factors Affecting Nutritional Intake: pain, nausea/vomiting    Anthropometrics    Temp: 97.8 °F (36.6 °C)  Height Method: Stated  Height: 5' 3" (160 cm)  Height (inches): 63 in  Weight Method: Bed Scale  Weight: 64 kg (141 lb 1.5 oz) (No New\)  Weight (lb): 141.1 lb  Ideal Body Weight (IBW), Female: 115 lb  % Ideal Body Weight, Female (lb): 122.7 lb  BMI (Calculated): 25  BMI Grade: 25 - 29.9 - overweight       Lab/Procedures/Meds    Pertinent Labs Reviewed: reviewed  Pertinent Medications Reviewed: reviewed    Physical Findings/Assessment    Overall Physical Appearance: advanced age, weak  Tubes:  (-)  Oral/Mouth Cavity: no grinding or difficulty chewing    Estimated/Assessed Needs    Weight Used For Calorie Calculations: 64 kg (141 lb 1.5 oz)  Energy Calorie Requirements (kcal): 1349  Energy Need Method: Mitesh-[place] Mcneal (x1.25)  Protein Requirements: 64-76 (1.0-1.2)  Weight Used " For Protein Calculations: 64 kg (141 lb 1.5 oz)  Fluid Requirements (mL): 1ml/kcal or per MD     RDA Method (mL): 1349         Nutrition Prescription Ordered    Current Diet Order: Low Sodium  Nutrition Order Comments: ~50% of meals    Evaluation of Received Nutrient/Fluid Intake    Energy Calories Required: not meeting needs  Protein Required: not meeting needs  Fluid Required: meeting needs  Tolerance: tolerating  % Intake of Estimated Energy Needs: 25 - 50 %  % Meal Intake: 25 - 50 %    Nutrition Risk    Level of Risk/Frequency of Follow-up:  (F/U 2x/wk)     Assessment and Plan    Inadequate dietary energy intake    Related to (etiology):   Decreased appetite    Signs and Symptoms (as evidenced by):   ~50% intake at this time    Interventions/Recommendations (treatment strategy):  Continue Low Sodium diet  Boost Plus BID  RD to monitor    Nutrition Diagnosis Status:   Other: Revised               Monitor and Evaluation    Food and Nutrient Intake: food and beverage intake  Food and Nutrient Adminstration: diet order  Knowledge/Beliefs/Attitudes: food and nutrition knowledge/skill, beliefs and attitudes  Physical Activity and Function: nutrition-related ADLs and IADLs  Anthropometric Measurements: weight, weight change  Biochemical Data, Medical Tests and Procedures: electrolyte and renal panel  Nutrition-Focused Physical Findings: overall appearance     Nutrition Follow-Up    RD Follow-up?: Yes

## 2018-05-21 NOTE — PT/OT/SLP PROGRESS
"Physical Therapy Treatment    Patient Name:  Merari Romero   MRN:  [id number]    Recommendations:     Discharge Recommendations:  rehabilitation facility   Discharge Equipment Recommendations: bath bench, bedside commode, walker, flora, wheelchair   Barriers to discharge: None    Assessment:     Merari Romero is a 78 y.o. female admitted with a medical diagnosis of <principal problem not specified>.  She presents with the following impairments/functional limitations:  weakness, impaired endurance, impaired self care skills, impaired functional mobilty, gait instability, impaired balance, decreased upper extremity function, decreased lower extremity function, decreased safety awareness, pain . Pt progressing well with POC goals. Pt with improving BLE strength and improving distance with ambulation. Pt with improving WB tolerance to LLE.    Rehab Prognosis:  Good; patient would benefit from acute skilled PT services to address these deficits and reach maximum level of function.      Recent Surgery: * No surgery found *      Plan:     During this hospitalization, patient to be seen  (1-2x/day Monday-Friday; PRN Weekends/holidays) to address the above listed problems via gait training, therapeutic activities, therapeutic exercises  · Plan of Care Expires:   (upon D/C from facility)   Plan of Care Reviewed with: patient, daughter    Subjective     Communicated with patient and her daughter prior to session.  Patient found seated EOB upon PT entry to room, agreeable to treatment.      Chief Complaint: "It just hurts. But I want to do better."  Patient comments/goals: "Get stronger and walk better."  Pain/Comfort:  · Pain Rating 1: 7/10  · Location - Side 1: Left  · Location - Orientation 1: generalized  · Location 1: hip  · Pain Addressed 1: Nurse notified  · Pain Rating Post-Intervention 1: 7/10    Patients cultural, spiritual, Zoroastrianism conflicts given the current situation:      Objective:     Patient found with:   "     General Precautions: Standard, fall   Orthopedic Precautions:LUE non weight bearing, LLE weight bearing as tolerated   Braces:  (LUE soft cast)     Functional Mobility:  · Transfers:     · Sit to Stand:  contact guard assistance with hemiwalker  · Bed to Chair: contact guard assistance with  hemiwalker  using  Step Transfer  · Gait: Gait training x 20 feet with hemiwalker and mod assist with cueing for gait sequencing and proper use of AD to decrease pt fall risk.       AM-PAC 6 CLICK MOBILITY  Turning over in bed (including adjusting bedclothes, sheets and blankets)?: 3  Sitting down on and standing up from a chair with arms (e.g., wheelchair, bedside commode, etc.): 3  Moving from lying on back to sitting on the side of the bed?: 3  Moving to and from a bed to a chair (including a wheelchair)?: 3  Need to walk in hospital room?: 3  Climbing 3-5 steps with a railing?: 1  Total Score: 16       Therapeutic Activities and Exercises:   Therapeutic exercises on BLE with A/A help on LLE at all joints in available planes of motion with rest breaks as needed to increase strength and endurance with all gross mobility skills.     Patient left up in chair with all lines intact, call button in reach, NSG notified and daughter present..    GOALS:    Physical Therapy Goals        Problem: Physical Therapy Goal    Goal Priority Disciplines Outcome Goal Variances Interventions   Physical Therapy Goal     PT/OT, PT      Description:  Goals to be met by: 2018     Patient will increase functional independence with mobility by performin. Supine to sit with Stand-by Assistance  2. Sit to supine with Stand-by Assistance  3. Rolling to Left and Right with Stand-by Assistance.  4. Sit to stand transfer with Stand-by Assistance, using bronson-walker  5. Bed to chair transfer with Stand-by Assistance using Bronson-walker  6. Gait  x 50 feet with Stand-by Assistance using Bronson-walker.              Problem: Physical Therapy Goal     Goal Priority Disciplines Outcome Goal Variances Interventions   Physical Therapy Goal     PT/OT, PT      Description:  Ongoing efforts  to achieved previously documented goals           Problem: Physical Therapy Goal    Goal Priority Disciplines Outcome Goal Variances Interventions   Physical Therapy Goal     PT/OT, PT      Description:  1. Pt will demonstrate hemiwalker use for 25 ft - WBAT Left lower extremiity with minimal assistance for safety                    Time Tracking:     PT Received On: 05/21/18  PT Start Time: 1405     PT Stop Time: 1435  PT Total Time (min): 30 min     Billable Minutes: Gait Training 15 minutes and Therapeutic Activity 15 minutes    Treatment Type: Treatment  PT/PTA: PT           Paige Ramirez, PT  05/21/2018

## 2018-05-22 PROCEDURE — 97530 THERAPEUTIC ACTIVITIES: CPT

## 2018-05-22 PROCEDURE — 63600175 PHARM REV CODE 636 W HCPCS: Performed by: NURSE PRACTITIONER

## 2018-05-22 PROCEDURE — 97116 GAIT TRAINING THERAPY: CPT

## 2018-05-22 PROCEDURE — 11000004 HC SNF PRIVATE

## 2018-05-22 PROCEDURE — A4216 STERILE WATER/SALINE, 10 ML: HCPCS | Performed by: FAMILY MEDICINE

## 2018-05-22 PROCEDURE — 94761 N-INVAS EAR/PLS OXIMETRY MLT: CPT

## 2018-05-22 PROCEDURE — 25000003 PHARM REV CODE 250: Performed by: FAMILY MEDICINE

## 2018-05-22 PROCEDURE — 97110 THERAPEUTIC EXERCISES: CPT

## 2018-05-22 PROCEDURE — 99900031 HC PATIENT EDUCATION (STAT)

## 2018-05-22 PROCEDURE — 99900035 HC TECH TIME PER 15 MIN (STAT)

## 2018-05-22 PROCEDURE — 97535 SELF CARE MNGMENT TRAINING: CPT

## 2018-05-22 PROCEDURE — 25000242 PHARM REV CODE 250 ALT 637 W/ HCPCS: Performed by: NURSE PRACTITIONER

## 2018-05-22 PROCEDURE — 25000003 PHARM REV CODE 250: Performed by: NURSE PRACTITIONER

## 2018-05-22 PROCEDURE — 94640 AIRWAY INHALATION TREATMENT: CPT

## 2018-05-22 RX ADMIN — DOCUSATE SODIUM 100 MG: 100 CAPSULE, LIQUID FILLED ORAL at 09:05

## 2018-05-22 RX ADMIN — MUPIROCIN: 20 OINTMENT TOPICAL at 09:05

## 2018-05-22 RX ADMIN — PANTOPRAZOLE SODIUM 40 MG: 40 TABLET, DELAYED RELEASE ORAL at 09:05

## 2018-05-22 RX ADMIN — SODIUM CHLORIDE, PRESERVATIVE FREE 10 ML: 5 INJECTION INTRAVENOUS at 09:05

## 2018-05-22 RX ADMIN — HYDROCODONE BITARTRATE AND ACETAMINOPHEN 1 TABLET: 5; 325 TABLET ORAL at 03:05

## 2018-05-22 RX ADMIN — DOCUSATE SODIUM 100 MG: 100 CAPSULE, LIQUID FILLED ORAL at 08:05

## 2018-05-22 RX ADMIN — AMLODIPINE BESYLATE 5 MG: 5 TABLET ORAL at 09:05

## 2018-05-22 RX ADMIN — IPRATROPIUM BROMIDE AND ALBUTEROL SULFATE 3 ML: 2.5; .5 SOLUTION RESPIRATORY (INHALATION) at 06:05

## 2018-05-22 RX ADMIN — METOPROLOL SUCCINATE 50 MG: 50 TABLET, EXTENDED RELEASE ORAL at 09:05

## 2018-05-22 RX ADMIN — ENOXAPARIN SODIUM 40 MG: 100 INJECTION SUBCUTANEOUS at 04:05

## 2018-05-22 RX ADMIN — AMITRIPTYLINE HYDROCHLORIDE 25 MG: 25 TABLET, FILM COATED ORAL at 08:05

## 2018-05-22 RX ADMIN — HYDROCODONE BITARTRATE AND ACETAMINOPHEN 1 TABLET: 5; 325 TABLET ORAL at 08:05

## 2018-05-22 RX ADMIN — IPRATROPIUM BROMIDE AND ALBUTEROL SULFATE 3 ML: 2.5; .5 SOLUTION RESPIRATORY (INHALATION) at 07:05

## 2018-05-22 RX ADMIN — ATORVASTATIN CALCIUM 20 MG: 20 TABLET, FILM COATED ORAL at 09:05

## 2018-05-22 RX ADMIN — DULOXETINE 30 MG: 30 CAPSULE, DELAYED RELEASE ORAL at 09:05

## 2018-05-22 RX ADMIN — HYDROCODONE BITARTRATE AND ACETAMINOPHEN 1 TABLET: 5; 325 TABLET ORAL at 02:05

## 2018-05-22 NOTE — PLAN OF CARE
05/22/18 1142   Discharge Reassessment   Assessment Type Discharge Planning Reassessment     Sw contacted [facility] and spoke with Марина. Марина will review the pt's chart and call back after lunch with further information. TESS will continue to follow and offer support as needed.    Manolo Monroe LMSW

## 2018-05-22 NOTE — PROGRESS NOTES
Staff    Report received from CECILIA Mijares. Patient lying in bed visiting with daughter. VS stable. C/o left hip pain. Assessment complete per flowsheet. Bed locked and in lowest position. Call bell in reach.   Resident Handoff

## 2018-05-22 NOTE — PT/OT/SLP PROGRESS
Occupational Therapy   Treatment    Name: Merari Romero  MRN: [id number]  Admitting Diagnosis:  <principal problem not specified>       Recommendations:     Discharge Recommendations: rehabilitation facility  Discharge Equipment Recommendations:  bedside commode, bath bench, walker, flora  Barriers to discharge:  None    Subjective     Communicated with: nursing prior to session.  Pain/Comfort:  · Pain Rating 1: 0/10  · Pain Rating Post-Intervention 1: 0/10    Patients cultural, spiritual, Mandaen conflicts given the current situation: none voiced    Objective:     Patient found with: Supine in bed with HOB elevated,  also present.    General Precautions: Standard, fall   Orthopedic Precautions:LUE non weight bearing, LLE weight bearing as tolerated   Braces: UE Sling (Long arm soft case to (L) UE)     Occupational Performance:    Bed Mobility:    · Patient completed Scooting/Bridging with modified independence  · Patient completed Supine to Sit with minimum assistance     Functional Mobility/Transfers:  · Patient completed Sit <> Stand Transfer with contact guard assistance  with  hemiwalker   · Patient completed Toilet Transfer step pivot technique with minimum assistance with  hemiwalker  · Functional Mobility: Pt ambulated 10' from the bed to the bathroom with Mod (A) due to cue's for walker and foot placement, and also to decrease pressure on therapist hand from (L) UE.  Pt ambulated 15' to [facility] from bathroom with Min (A)     Activities of Daily Living:  · Grooming: minimum assistance hand washing standing at sink with Min (A) for balance  · Toileting: minimum assistance bedside commode over toilet    Patient left up in chair with call button in reach and spouse present      Assessment:     Merari Romero is a 78 y.o. female with a medical diagnosis of <principal problem not specified>.  Pt with increase tolerance and performance in treatment session today.  Pt continue to have difficulty with bed mobility  pushing up from side-lying ling sit.    Performance deficits affecting function are impaired endurance, impaired self care skills, impaired functional mobilty, impaired balance, weakness.      Rehab Prognosis:  good; patient would benefit from acute skilled OT services to address these deficits and reach maximum level of function.       Plan:     Patient to be seen 3 x/week, 5 x/week to address the above listed problems via self-care/home management, therapeutic activities, therapeutic exercises  · Plan of Care Expires:  (d/c from facility)  · Plan of Care Reviewed with: patient    This Plan of care has been discussed with the patient who was involved in its development and understands and is in agreement with the identified goals and treatment plan    GOALS:    Occupational Therapy Goals        Problem: Occupational Therapy Goal    Goal Priority Disciplines Outcome Interventions   Occupational Therapy Goal     OT, PT/OT Ongoing (interventions implemented as appropriate)    Description:  Goals to be met by: 05/24/2018     Patient will increase functional independence with ADLs by performing:    UE Dressing to be assessed (goal met 5/18/2018)  LE Dressing with Minimal Assistance. (goal partially met with socks)  Toilet transfer to bedside commode with Contact Guard Assistance.    Updated goals 5/21/2018: Will continue to work on goals 2 and 3 above and add  UE dressing with SBA                       Time Tracking:     OT Date of Treatment: 05/22/18  OT Start Time: 0235  OT Stop Time: 0300  OT Total Time (min): 25 min    Billable Minutes:Self Care/Home Management 15  Therapeutic Activity 10 min    Ani Dueñas OT  5/22/2018

## 2018-05-22 NOTE — PT/OT/SLP PROGRESS
"Physical Therapy Treatment    Patient Name:  Merari Romero   MRN:  [id number]    Recommendations:       Discharge Recommendations:  home with home health, home health PT   Discharge Equipment Recommendations: bath bench, bedside commode, wheelchair, walker, flora   Barriers to discharge: None    Assessment:     Merari Romero is a 78 y.o. female admitted with a medical diagnosis of <principal problem not specified>.  She presents with the following impairments/functional limitations:  weakness, gait instability, impaired endurance, impaired balance, decreased lower extremity function, impaired self care skills, impaired functional mobilty .  Pt. is progressing towards PT POC goals.    Rehab Prognosis:  Fair+; patient would benefit from acute skilled PT services to address these deficits and reach maximum level of function.      Recent Surgery: * No surgery found *      Plan:     During this hospitalization, patient to be seen  (1-2x/day(M-F); PRN(Sat.,Sun.)) to address the above listed problems via therapeutic exercises, therapeutic activities, gait training  · Plan of Care Expires:   (Upon D/C from facility)   Plan of Care Reviewed with: patient, spouse    Subjective     Communicated with patient prior to session.  Patient found seated in W/C upon PT entry to room, agreeable to treatment.      Chief Complaint: Fatigue  Patient comments/goals: "I'm ready to get in bed."  Pain/Comfort:  · Pain Rating 1: 5/10  · Location - Side 1: Left  · Location - Orientation 1: medial  · Location 1: hip  · Pain Addressed 1: Nurse notified, Reposition  · Pain Rating Post-Intervention 1: 3/10    Patients cultural, spiritual, Mosque conflicts given the current situation: none verbalized    Objective:     Patient found with:       General Precautions: Standard, fall   Orthopedic Precautions:LUE non weight bearing, LLE weight bearing as tolerated   Braces:  (LUE soft cast)     Functional Mobility:  · Bed Mobility:     · Rolling Left:  stand " by assistance  · Rolling Right: stand by assistance  · Scooting: stand by assistance  · Bridging: stand by assistance  · Supine to Sit: stand by assistance  · Sit to Supine: stand by assistance  · Transfers:     · Sit to Stand:  contact guard assistance with hemiwalker  · Bed to Chair: contact guard assistance with  hemiwalker  using  Step Transfer  Balance:   Static Sit: FAIR+: Able to take MINIMAL challenges from all directions  Dynamic Sit: FAIR+: Maintains balance through MINIMAL excursions of active trunk motion  Static Stand: POOR+: Needs MINIMAL assist to maintain        AM-PAC 6 CLICK MOBILITY  Turning over in bed (including adjusting bedclothes, sheets and blankets)?: 3  Sitting down on and standing up from a chair with arms (e.g., wheelchair, bedside commode, etc.): 3  Moving from lying on back to sitting on the side of the bed?: 3  Moving to and from a bed to a chair (including a wheelchair)?: 3  Need to walk in hospital room?: 3  Climbing 3-5 steps with a railing?: 1  Total Score: 16       Therapeutic Activities and Exercises:   There. Act.:  Weight shifting and weight acceptance tasks performed in standing with min. A.  Trunk control tasks performed with min. A. while pt. seated EOB and in bedside chair. Transfer Training performed with assistance as noted.  VC's and manual guidance given to pt. for sequencing.    There. Ex.:  Pt. Performed gentle A/A exercises of BLE's while supine in bed, for N.M. tone and strength and fluid dynamics. 1-on-1 BLE HC stretches performed with pt.  BLE: ankle pumps, quad sets, SAQ, glut sets, hip abd/add, heel slides (2x10) performed with pt.    Patient left HOB elevated with all lines intact, call button in reach, RN notified and family present..    GOALS:    Physical Therapy Goals        Problem: Physical Therapy Goal    Goal Priority Disciplines Outcome Goal Variances Interventions   Physical Therapy Goal     PT/OT, PT      Description:  Goals to be met by: 5/31/2018      Patient will increase functional independence with mobility by performin. Supine to sit with Stand-by Assistance  2. Sit to supine with Stand-by Assistance  3. Rolling to Left and Right with Stand-by Assistance.  4. Sit to stand transfer with Stand-by Assistance, using bronson-walker  5. Bed to chair transfer with Stand-by Assistance using Bronson-walker  6. Gait  x 50 feet with Stand-by Assistance using Bronson-walker.              Problem: Physical Therapy Goal    Goal Priority Disciplines Outcome Goal Variances Interventions   Physical Therapy Goal     PT/OT, PT      Description:  Ongoing efforts  to achieved previously documented goals           Problem: Physical Therapy Goal    Goal Priority Disciplines Outcome Goal Variances Interventions   Physical Therapy Goal     PT/OT, PT Ongoing (interventions implemented as appropriate)     Description:  1. Pt will demonstrate hemiwalker use for 25 ft - WBAT Left lower extremiity with minimal assistance for safety                    Time Tracking:     PT Received On: 18  PT Start Time: 1228     PT Stop Time: 1258  PT Total Time (min): 30 min     Billable Minutes: Therapeutic Activity 15 minutes and Therapeutic Exercise 15 minutes    Treatment Type: Treatment  PT/PTA: PT           Dipak Lemos, PT  2018

## 2018-05-22 NOTE — PLAN OF CARE
Problem: Patient Care Overview  Goal: Plan of Care Review  Outcome: Ongoing (interventions implemented as appropriate)  Patient aware of plan of care. Patient had restful night. VS stable. Left sided hip pain relieved with hydrocodone and IV morphine for breakthrough pain. Frequent repositioning. Left arm in soft cast elevated with pillow. BLE elevated with pillows. Neuro checks wnl. Up to BSC with assistance. Daughter at bedside. Free from falls/injuries. No questions or concerns at this time. Agrees with plan of care.

## 2018-05-22 NOTE — PT/OT/SLP PROGRESS
"Physical Therapy Treatment    Patient Name:  Merari Romero   MRN:  [id number]    Recommendations:     Discharge Recommendations:  rehabilitation facility   Discharge Equipment Recommendations: bath bench, bedside commode, walker, flora, wheelchair   Barriers to discharge: None    Assessment:     Merari Romero is a 78 y.o. female admitted with a medical diagnosis of <principal problem not specified>.  She presents with the following impairments/functional limitations:  weakness, impaired endurance, impaired balance, gait instability, impaired functional mobilty, impaired self care skills, decreased safety awareness, decreased lower extremity function, decreased upper extremity function, orthopedic precautions, pain .  Pt. is progressing towards PT POC goals.    Rehab Prognosis:  Fair; patient would benefit from acute skilled PT services to address these deficits and reach maximum level of function.      Recent Surgery: * No surgery found *      Plan:     During this hospitalization, patient to be seen  (1-2x/day(M-F); PRN(Sat.,Sun.)) to address the above listed problems via therapeutic exercises, therapeutic activities, gait training  · Plan of Care Expires:   (Upon D/C from facility)   Plan of Care Reviewed with: patient, daughter    Subjective     Communicated with patient prior to session.  Patient found supine in bed upon PT entry to room, agreeable to treatment.        Patient comments/goals: "My leg feels weak."  Pain/Comfort:  · Pain Rating 1: 5/10  · Location - Side 1: Left  · Location - Orientation 1: medial  · Location 1: hip  · Pain Addressed 1: Nurse notified, Reposition  · Pain Rating Post-Intervention 1: 3/10    Patients cultural, spiritual, Methodist conflicts given the current situation: none verbalized    Objective:     Patient found with:       General Precautions: Standard, fall   Orthopedic Precautions:LUE non weight bearing, LLE weight bearing as tolerated   Braces:  (LUE soft cast)     Functional " Mobility:  · Bed Mobility:     · Rolling Left:  stand by assistance  · Rolling Right: stand by assistance  · Scooting: stand by assistance  · Bridging: stand by assistance  · Supine to Sit: stand by assistance  · Sit to Supine: stand by assistance  · Transfers:     · Sit to Stand:  contact guard assistance with hemiwalker  · Bed to Chair: contact guard assistance with  hemiwalker  using  Step Transfer  · Gait: Gait Training :  Pt. amb. 15' with flora-walker,  mod. A.  VC's given to pt. for step placement and A.D. placement.  Pt. demonstrated decreased velocity of gait mechanics, decreased step length and decreased weight-shifting ability.  Balance:   Static Sit: FAIR+: Able to take MINIMAL challenges from all directions  Dynamic Sit: FAIR+: Maintains balance through MINIMAL excursions of active trunk motion  Static Stand: POOR+: Needs MINIMAL assist to maintain  · Dynamic stand: POOR: N/A  ·       AM-PAC 6 CLICK MOBILITY  Turning over in bed (including adjusting bedclothes, sheets and blankets)?: 3  Sitting down on and standing up from a chair with arms (e.g., wheelchair, bedside commode, etc.): 3  Moving from lying on back to sitting on the side of the bed?: 3  Moving to and from a bed to a chair (including a wheelchair)?: 3  Need to walk in hospital room?: 3  Climbing 3-5 steps with a railing?: 1  Total Score: 16       Therapeutic Activities and Exercises:   There. Act.:  Weight shifting and weight acceptance tasks performed in standing with min. A.  Trunk control tasks performed with min. A. while pt. seated EOB and in bedside chair. Transfer Training performed with assistance as noted.  VC's and manual guidance given to pt. for sequencing.    Patient left up in chair with all lines intact, call button in reach, RN notified and family present..    GOALS:    Physical Therapy Goals        Problem: Physical Therapy Goal    Goal Priority Disciplines Outcome Goal Variances Interventions   Physical Therapy Goal      PT/OT, PT      Description:  Goals to be met by: 2018     Patient will increase functional independence with mobility by performin. Supine to sit with Stand-by Assistance  2. Sit to supine with Stand-by Assistance  3. Rolling to Left and Right with Stand-by Assistance.  4. Sit to stand transfer with Stand-by Assistance, using bronson-walker  5. Bed to chair transfer with Stand-by Assistance using Bronson-walker  6. Gait  x 50 feet with Stand-by Assistance using Bronson-walker.              Problem: Physical Therapy Goal    Goal Priority Disciplines Outcome Goal Variances Interventions   Physical Therapy Goal     PT/OT, PT      Description:  Ongoing efforts  to achieved previously documented goals           Problem: Physical Therapy Goal    Goal Priority Disciplines Outcome Goal Variances Interventions   Physical Therapy Goal     PT/OT, PT Ongoing (interventions implemented as appropriate)     Description:  1. Pt will demonstrate hemiwalker use for 25 ft - WBAT Left lower extremiity with minimal assistance for safety                    Time Tracking:     PT Received On: 18  PT Start Time: 1020     PT Stop Time: 1043  PT Total Time (min): 23 min     Billable Minutes: Gait Training 8 minutes and Therapeutic Activity 15 minutes    Treatment Type: Treatment  PT/PTA: PT           Dipak Lemos, PT  2018

## 2018-05-22 NOTE — PLAN OF CARE
Problem: Patient Care Overview  Goal: Plan of Care Review  Outcome: Ongoing (interventions implemented as appropriate)  Patient vitals stable. Moderate pain controlled with PO pain medication. Family is at bedside. Patient remains free from falls. Ambulates with assistance and with walker. PT & OT. Neuro checks. Left arm in soft cast. Dressings to skin tears on left arm changed; Bactroban applied. Plan of care explained to patient and family. They voiced understanding.

## 2018-05-22 NOTE — PLAN OF CARE
05/22/18 1214   Discharge Reassessment   Assessment Type Discharge Planning Reassessment     Sw contacted [facility] and spoke with Felisa who advised that the pt's information be faxed [phone] for review. Sw will fax pt's information and continue to follow and offer support as needed.    Manolo Monroe LMSW

## 2018-05-23 PROCEDURE — 94761 N-INVAS EAR/PLS OXIMETRY MLT: CPT

## 2018-05-23 PROCEDURE — 99232 SBSQ HOSP IP/OBS MODERATE 35: CPT | Mod: ,,, | Performed by: FAMILY MEDICINE

## 2018-05-23 PROCEDURE — 63600175 PHARM REV CODE 636 W HCPCS: Performed by: NURSE PRACTITIONER

## 2018-05-23 PROCEDURE — 25000242 PHARM REV CODE 250 ALT 637 W/ HCPCS: Performed by: NURSE PRACTITIONER

## 2018-05-23 PROCEDURE — 94640 AIRWAY INHALATION TREATMENT: CPT

## 2018-05-23 PROCEDURE — 97530 THERAPEUTIC ACTIVITIES: CPT

## 2018-05-23 PROCEDURE — 97110 THERAPEUTIC EXERCISES: CPT

## 2018-05-23 PROCEDURE — A4216 STERILE WATER/SALINE, 10 ML: HCPCS | Performed by: FAMILY MEDICINE

## 2018-05-23 PROCEDURE — 25000003 PHARM REV CODE 250: Performed by: FAMILY MEDICINE

## 2018-05-23 PROCEDURE — 25000003 PHARM REV CODE 250: Performed by: NURSE PRACTITIONER

## 2018-05-23 PROCEDURE — 11000004 HC SNF PRIVATE

## 2018-05-23 PROCEDURE — 97116 GAIT TRAINING THERAPY: CPT

## 2018-05-23 RX ORDER — KETOROLAC TROMETHAMINE 30 MG/ML
15 INJECTION, SOLUTION INTRAMUSCULAR; INTRAVENOUS ONCE
Status: COMPLETED | OUTPATIENT
Start: 2018-05-23 | End: 2018-05-23

## 2018-05-23 RX ADMIN — ATORVASTATIN CALCIUM 20 MG: 20 TABLET, FILM COATED ORAL at 09:05

## 2018-05-23 RX ADMIN — DULOXETINE 30 MG: 30 CAPSULE, DELAYED RELEASE ORAL at 09:05

## 2018-05-23 RX ADMIN — DOCUSATE SODIUM 100 MG: 100 CAPSULE, LIQUID FILLED ORAL at 09:05

## 2018-05-23 RX ADMIN — AMITRIPTYLINE HYDROCHLORIDE 25 MG: 25 TABLET, FILM COATED ORAL at 08:05

## 2018-05-23 RX ADMIN — HYDROCODONE BITARTRATE AND ACETAMINOPHEN 1 TABLET: 5; 325 TABLET ORAL at 02:05

## 2018-05-23 RX ADMIN — ENOXAPARIN SODIUM 40 MG: 100 INJECTION SUBCUTANEOUS at 05:05

## 2018-05-23 RX ADMIN — HYDROCODONE BITARTRATE AND ACETAMINOPHEN 1 TABLET: 5; 325 TABLET ORAL at 05:05

## 2018-05-23 RX ADMIN — SODIUM CHLORIDE, PRESERVATIVE FREE 10 ML: 5 INJECTION INTRAVENOUS at 09:05

## 2018-05-23 RX ADMIN — PANTOPRAZOLE SODIUM 40 MG: 40 TABLET, DELAYED RELEASE ORAL at 09:05

## 2018-05-23 RX ADMIN — IPRATROPIUM BROMIDE AND ALBUTEROL SULFATE 3 ML: 2.5; .5 SOLUTION RESPIRATORY (INHALATION) at 07:05

## 2018-05-23 RX ADMIN — METOPROLOL SUCCINATE 50 MG: 50 TABLET, EXTENDED RELEASE ORAL at 09:05

## 2018-05-23 RX ADMIN — AMLODIPINE BESYLATE 5 MG: 5 TABLET ORAL at 09:05

## 2018-05-23 RX ADMIN — KETOROLAC TROMETHAMINE 15 MG: 30 INJECTION, SOLUTION INTRAMUSCULAR at 09:05

## 2018-05-23 RX ADMIN — DOCUSATE SODIUM 100 MG: 100 CAPSULE, LIQUID FILLED ORAL at 08:05

## 2018-05-23 RX ADMIN — SODIUM CHLORIDE, PRESERVATIVE FREE 10 ML: 5 INJECTION INTRAVENOUS at 08:05

## 2018-05-23 RX ADMIN — IPRATROPIUM BROMIDE AND ALBUTEROL SULFATE 3 ML: 2.5; .5 SOLUTION RESPIRATORY (INHALATION) at 06:05

## 2018-05-23 RX ADMIN — HYDROCODONE BITARTRATE AND ACETAMINOPHEN 1 TABLET: 5; 325 TABLET ORAL at 08:05

## 2018-05-23 NOTE — PROGRESS NOTES
Staff Handoff    Bedside report received from CECILIA Alvarado. Patient lying in bed. VS stable. Assessment complete per flowsheet. Bed locked and in lowest position. Call bell in reach.  Resident Handoff

## 2018-05-23 NOTE — PROGRESS NOTES
[facility] Hospital Medicine  Progress Note    Patient Name: Merari Romero  MRN: [id number]  Patient Class: IP- Swing   Admission Date: 5/17/2018  Length of Stay: 6 days  Attending Physician: Michael Chacon MD  Primary Care Provider: Shaun Barraza MD        Subjective:     Principal Problem:<principal problem not specified>    HPI:  5/18/18 SWING ADMIT  78 year old female admitted 5/11/18 for Closed nondisplaced fracture of neck of left radius after a falll going up stairs, slipped on first step.. She was admitted for pain control and PT. She is progressing with PT but slow. Did not qualify for inpatient rehab as can not tolerate 3hr of activity.    SWING placement yesterday for continued PT/OT . She was living at home with  prior to fall and able to take care of self and him.  Patient had a restful night. VS stable. Some  C/o left sided pelvic pain, relieved with hydrocodone x2 and morphine x1 for breakthrough pain. Ace wrap and sling to left arm intact. She reports she tolerated PT well yesterday.  Encouraged continue IS.    and daughter at bedside, supportive. Pt. amb. 10' with hemiwalker, mod. A. No c/o SOB or fevers this morning.    Hospital Course:  5/21/18  She is doing well. Skin tears healing well. She is ambulating 20 ft with PT using rolling walker. Her goal is 50 ft. She has only used norco PO over last 24hr to control her pain. She remains on RA with POX 93-96%. Has IS at bedside. Using PRN. No fever. On lovonox SQ for DVT prophylaxis. Daughter at bedside reports that they would like to take her home rather than send her to INPT rehab if she is strong enough by D/c    5/23/18  Yesterday did well with therapy. Using stand by assist she was able to do all bed mobility, and used contact guard assist to transfer from sit to stand and ambulated with the same assist using flora walker 15 ft. This is better than the weekend where she was still requiring min assist per PT.  She is progressing towards her goal of stand by assist 50ft using flora walker.     She has not required morphine for breakthrough pain since the 5/21/18. Using norco PRN for pain.     VSS/afebrile, maintaining sats on RA, IS at bedside and nebs BID. lovenox daily     C/o left foot pain today. She reports that the the top of foot to her knee. Has hx of arthritis. Just started yesterday Pain meds help the pain in foot as well as hip    Interval History: see      Review of Systems   Constitutional: Negative for chills and fever.   Respiratory: Negative for shortness of breath.    Cardiovascular: Negative for chest pain and palpitations.   Gastrointestinal: Negative for abdominal pain, blood in stool, constipation and nausea.   Genitourinary: Negative for difficulty urinating and pelvic pain.   Musculoskeletal: Positive for arthralgias and myalgias.        Left hip pain with moving as well as left arm pain with moving   Skin:        Left middle toe bruised   Psychiatric/Behavioral: Negative for dysphoric mood, sleep disturbance and suicidal ideas. The patient is not nervous/anxious.      Objective:     Vital Signs (Most Recent):  Temp: 96.9 °F (36.1 °C) (05/23/18 0745)  Pulse: 70 (05/23/18 0748)  Resp: 17 (05/23/18 0748)  BP: 125/60 (05/23/18 0745)  SpO2: 96 % (05/23/18 0748) Vital Signs (24h Range):  Temp:  [96 °F (35.6 °C)-97.1 °F (36.2 °C)] 96.9 °F (36.1 °C)  Pulse:  [70-86] 70  Resp:  [17-20] 17  SpO2:  [94 %-97 %] 96 %  BP: (110-149)/(60-74) 125/60     Weight: 64 kg (141 lb 1.5 oz) (No New\)  Body mass index is 24.99 kg/m².  No intake or output data in the 24 hours ending 05/23/18 0800   Physical Exam   Constitutional: She is oriented to person, place, and time. She appears well-developed. No distress.   HENT:   Head: Normocephalic and atraumatic.   Eyes: Conjunctivae are normal. Pupils are equal, round, and reactive to light.   Neck: Normal range of motion. Neck supple. No thyromegaly present.   Cardiovascular:  Normal rate, regular rhythm, normal heart sounds and intact distal pulses.    Pulmonary/Chest: Effort normal and breath sounds normal. No respiratory distress. She has no wheezes.   Abdominal: Soft. Bowel sounds are normal. There is no tenderness.   Musculoskeletal: She exhibits tenderness (hip bilaterally). She exhibits no edema.   Left sling in place    Left foot with MTP arthritic changes    Lymphadenopathy:     She has no cervical adenopathy.   Neurological: She is alert and oriented to person, place, and time.   Skin: Skin is warm and dry. No rash noted.   Psychiatric: She has a normal mood and affect. Her behavior is normal.   Nursing note and vitals reviewed.      Significant Labs:   Lab Results   Component Value Date    WBC 5.56 05/21/2018    HGB 12.6 05/21/2018    HCT 38.4 05/21/2018    MCV 89 05/21/2018     (H) 05/21/2018       Significant Imaging:     Left hip x ray No definite evidence of a displaced fracture or dislocation.    The symphysis pubis is limited to evaluation due to angulation and positioning and a pubis fracture cannot be excluded..    CXR 2.3 cm confluence of shadows overlying the left upper lobe.  Consider nonurgent follow-up chest with lateral for further evaluation  .  REPEAT CXR   Chronic lung changes are seen bilaterally.  Focal region of scarring is seen in the left suprahilar location, less prominent as compared to the previous study of 05/11/2018.  No consolidation or pleural effusions.  The heart is normal in size.  Calcified atheromatous disease affects the aorta.  The bones are osteopenic and show age-appropriate degenerative change.    Left elbow x ray There is a fracture involving the radial neck.  Posterior fat pad is present compatible with an effusion.    Left hip x rays repeat  There are fractures involving the left symphysis pubis and the inferior ramus which were limited to evaluation on the prior study.    No evidence of a fracture of the left hip.  No  dislocation.  Vascular calcifications overlying the left femoral head and neck.        CRANIAL NERVES     CN III, IV, VI   Pupils are equal, round, and reactive to light.        Assessment/Plan:      Inadequate dietary energy intake              Debility    Continue PT/OT  IS encouraged.         Fall    Fear of falling. Cont with PT/OT          Urinary hesitancy    U/ a and culture- all normal  Better now that she can get up to BEDS SIDE commode          History of deep venous thrombosis    lovenox 40 daily due to hx of DVT and immobility        Closed fracture of left pubis    Non operative   Pain control try and cont po as much as poss  Try and reduce risk of associated complications of limited mobility(IS, nebs, PT)  Discussed with Bo GONZALEZ ortho. Seen patient 5/16, cont non wt bearing to left upper ext with long arm splint and wt bearing as tolerated to left lower ext. F/u Dr Lemos 3 weeks for repeat films  Cont PT till safe for D/C to home        Closed nondisplaced fracture of neck of left radius    Ortho consulted via ER, non surgical orthopedic injuries per Dr Doc Wolf, non wt bearing injury to left upper ext Splinted   She also can wt bear as tolerated to left lower ext   Bo Urbina NP with ortho came 5/16, no changes, f/u Aminta 3 weeks for repeat films  Undressed splint 5/21 to assess skin tears, healing well, no s/s infection. Will undress biweekly and apply bactroban          Hyperlipidemia LDL goal <70    Cont lipitor        OA (osteoarthritis)    Give small dose toradol today          Neuropathy of lower extremity    Cont cymbalta and amitriptyline          HTN (hypertension)    //70  Wel;l controlled on amlodipine and toprol          VTE Risk Mitigation         Ordered     enoxaparin injection 40 mg  Daily      05/17/18 1159     IP VTE HIGH RISK PATIENT  Once      05/17/18 1158     Place sequential compression device  Until discontinued      05/17/18 1158              Michael AGARWAL  MD Ines  Department of Hospital Medicine   [facility]

## 2018-05-23 NOTE — PT/OT/SLP PROGRESS
"Occupational Therapy   Treatment    Name: Merari Romero  MRN: [id number]  Admitting Diagnosis:  <principal problem not specified>      Recommendations:     Discharge Recommendations:  (would benefit from inpatient rehab, however if return home at current status will need 24 hour family assistance)  Discharge Equipment Recommendations:  bedside commode, bath bench, hip kit, walker, flora    Subjective     Communicated with: nursing prior to session.  Pain/Comfort:  · Pain Rating 1:  (pt reported "at the moment not a lot of pain but tired from sitting up all morning" per pt)    Patients cultural, spiritual, Scientology conflicts given the current situation:  (none verbalized)    Objective:     Patient found with:  (sitting in bedside chair on OT arrival)    General Precautions: Standard, fall   Orthopedic Precautions:LUE non weight bearing, LLE weight bearing as tolerated   Braces:  (left UE long arm soft cast)     Occupational Performance:    Bed Mobility:    · Patient completed Supine to Sit with contact guard assistance     Functional Mobility/Transfers:  Pt refused as she reported she had just got back in bed after sitting up all morning    Activities of Daily Living:  · Grooming: supervision in sitting  · UB Dressing: contact guard assistance and increased time with verbal cues  · LB Dressing: pt  refused stating she has been doing OK on her own with pants from hips to kness and back to hips    Patient left supine with all lines intact, call button in reach and spouse present  Education:    Assessment:     Merari Romero is a 78 y.o. female with a medical diagnosis of <principal problem not specified>.   Performance deficits affecting function are impaired self care skills, weakness, impaired endurance, impaired functional mobilty, decreased upper extremity function, pain, however progressing well with ADL's with function now between sup/SBA and min A for ADL's.    Rehab Prognosis:  good; patient would benefit from acute " skilled OT services to address these deficits and reach maximum level of function.       Plan:     Patient to be seen  (continue 3 to 5 x week) to address the above listed problems via self-care/home management, neuromuscular re-education, therapeutic activities, therapeutic exercises  · Plan of Care Expires:  (upon discharge from facility )  · Plan of Care Reviewed with: patient, spouse    This Plan of care has been discussed with the patient who was involved in its development and understands and is in agreement with the identified goals and treatment plan    GOALS:    Occupational Therapy Goals        Problem: Occupational Therapy Goal    Goal Priority Disciplines Outcome Interventions   Occupational Therapy Goal     OT, PT/OT Ongoing (interventions implemented as appropriate)    Description:  Goals to be met by: 05/24/2018     Patient will increase functional independence with ADLs by performing:    UE Dressing to be assessed (goal met 5/18/2018)  LE Dressing with Minimal Assistance. (goal partially met with socks)  Toilet transfer to bedside commode with Contact Guard Assistance.    Updated goals 5/21/2018: Will continue to work on goals 2 and 3 above and add  UE dressing with SBA                       Time Tracking:     OT Date of Treatment: 05/23/18  OT Start Time: 1100  OT Stop Time: 1115  OT Total Time (min): 15 min    Billable Minutes:Therapeutic Activity 15 min    LEYLA Plascencia  5/23/2018

## 2018-05-23 NOTE — PLAN OF CARE
05/23/18 1511   Discharge Reassessment   Assessment Type Discharge Planning Reassessment     Sw contacted [facility] and spoke with Leighton who stated that the pt was denied bc she is unable to tolerate 3 hours of therapy. SW will continue to follow and offer support as needed.    Manolo Monroe LMSW

## 2018-05-23 NOTE — SUBJECTIVE & OBJECTIVE
Interval History: see      Review of Systems   Constitutional: Negative for chills and fever.   Respiratory: Negative for shortness of breath.    Cardiovascular: Negative for chest pain and palpitations.   Gastrointestinal: Negative for abdominal pain, blood in stool, constipation and nausea.   Genitourinary: Negative for difficulty urinating and pelvic pain.   Musculoskeletal: Positive for arthralgias and myalgias.        Left hip pain with moving as well as left arm pain with moving   Skin:        Left middle toe bruised   Psychiatric/Behavioral: Negative for dysphoric mood, sleep disturbance and suicidal ideas. The patient is not nervous/anxious.      Objective:     Vital Signs (Most Recent):  Temp: 96.9 °F (36.1 °C) (05/23/18 0745)  Pulse: 70 (05/23/18 0748)  Resp: 17 (05/23/18 0748)  BP: 125/60 (05/23/18 0745)  SpO2: 96 % (05/23/18 0748) Vital Signs (24h Range):  Temp:  [96 °F (35.6 °C)-97.1 °F (36.2 °C)] 96.9 °F (36.1 °C)  Pulse:  [70-86] 70  Resp:  [17-20] 17  SpO2:  [94 %-97 %] 96 %  BP: (110-149)/(60-74) 125/60     Weight: 64 kg (141 lb 1.5 oz) (No New\)  Body mass index is 24.99 kg/m².  No intake or output data in the 24 hours ending 05/23/18 0800   Physical Exam   Constitutional: She is oriented to person, place, and time. She appears well-developed. No distress.   HENT:   Head: Normocephalic and atraumatic.   Eyes: Conjunctivae are normal. Pupils are equal, round, and reactive to light.   Neck: Normal range of motion. Neck supple. No thyromegaly present.   Cardiovascular: Normal rate, regular rhythm, normal heart sounds and intact distal pulses.    Pulmonary/Chest: Effort normal and breath sounds normal. No respiratory distress. She has no wheezes.   Abdominal: Soft. Bowel sounds are normal. There is no tenderness.   Musculoskeletal: She exhibits tenderness (hip bilaterally). She exhibits no edema.   Left sling in place    Left foot with MTP arthritic changes    Lymphadenopathy:     She has no cervical  adenopathy.   Neurological: She is alert and oriented to person, place, and time.   Skin: Skin is warm and dry. No rash noted.   Psychiatric: She has a normal mood and affect. Her behavior is normal.   Nursing note and vitals reviewed.      Significant Labs:   Lab Results   Component Value Date    WBC 5.56 05/21/2018    HGB 12.6 05/21/2018    HCT 38.4 05/21/2018    MCV 89 05/21/2018     (H) 05/21/2018       Significant Imaging:     Left hip x ray No definite evidence of a displaced fracture or dislocation.    The symphysis pubis is limited to evaluation due to angulation and positioning and a pubis fracture cannot be excluded..    CXR 2.3 cm confluence of shadows overlying the left upper lobe.  Consider nonurgent follow-up chest with lateral for further evaluation  .  REPEAT CXR   Chronic lung changes are seen bilaterally.  Focal region of scarring is seen in the left suprahilar location, less prominent as compared to the previous study of 05/11/2018.  No consolidation or pleural effusions.  The heart is normal in size.  Calcified atheromatous disease affects the aorta.  The bones are osteopenic and show age-appropriate degenerative change.    Left elbow x ray There is a fracture involving the radial neck.  Posterior fat pad is present compatible with an effusion.    Left hip x rays repeat  There are fractures involving the left symphysis pubis and the inferior ramus which were limited to evaluation on the prior study.    No evidence of a fracture of the left hip.  No dislocation.  Vascular calcifications overlying the left femoral head and neck.        CRANIAL NERVES     CN III, IV, VI   Pupils are equal, round, and reactive to light.

## 2018-05-23 NOTE — PLAN OF CARE
Problem: Patient Care Overview  Goal: Plan of Care Review  Outcome: Ongoing (interventions implemented as appropriate)  Patient aware of plan of care. Patient had restful night. VS stable, afebrile. Left hip pain relieved with hydrocodone. PT/OT. Left arm in soft cast, elevated on pillow. Frequent repositioning.  Daughter at bedside. Free from falls/injuries. No questions or concerns at this time. Agrees with plan of care.

## 2018-05-23 NOTE — PT/OT/SLP PROGRESS
"Physical Therapy Treatment    Patient Name:  Merari Romero   MRN:  [id number]    Recommendations:     Discharge Recommendations:  home with home health, home health PT   Discharge Equipment Recommendations: walker, flora, wheelchair, bedside commode   Barriers to discharge: None    Assessment:     Merari Romero is a 78 y.o. female admitted with a medical diagnosis of <principal problem not specified>.  She presents with the following impairments/functional limitations:  weakness, impaired endurance, impaired self care skills, impaired functional mobilty, gait instability, impaired balance, impaired coordination, impaired fine motor, impaired skin .    Rehab Prognosis:  Good; patient would benefit from acute skilled PT services to address these deficits and reach maximum level of function.      Recent Surgery: * No surgery found *      Plan:     During this hospitalization, patient to be seen  (1-2x/day Monday-Friday; PRN Weekends/Holidays) to address the above listed problems via gait training, therapeutic activities, therapeutic exercises  · Plan of Care Expires:   (upon D/C from facility)   Plan of Care Reviewed with: patient, spouse, daughter    Subjective     Communicated with patient and family prior to session.  Patient found supine in bed upon PT entry to room, agreeable to treatment.      Chief Complaint: "My legs hurts right when I get up."  Patient comments/goals: "Walk again on my one."  Pain/Comfort:  · Pain Rating 1: 0/10    Patients cultural, spiritual, Restorationism conflicts given the current situation:      Objective:     Patient found with:       General Precautions: Standard, fall   Orthopedic Precautions:LUE non weight bearing, LLE weight bearing as tolerated   Braces: N/A     Functional Mobility:  · Bed Mobility:     · Rolling Left:  contact guard assistance  · Rolling Right: contact guard assistance  · Scooting: contact guard assistance  · Supine to Sit: contact guard assistance  · Sit to Supine: contact " guard assistance  · Transfers:     · Sit to Stand:  contact guard assistance with hemiwalker  · Bed to Chair: contact guard assistance with  hemiwalker  using  Step Transfer  · Toilet Transfer: contact guard assistance with  hemiwalker  using  Step Transfer  · Gait: Gait training x 45 feet with hemiwalker and CGA with cueing for gait sequencing and upright posturing to decrease pt fall risk.       AM-PAC 6 CLICK MOBILITY  Turning over in bed (including adjusting bedclothes, sheets and blankets)?: 3  Sitting down on and standing up from a chair with arms (e.g., wheelchair, bedside commode, etc.): 3  Moving from lying on back to sitting on the side of the bed?: 3  Moving to and from a bed to a chair (including a wheelchair)?: 3  Need to walk in hospital room?: 3  Climbing 3-5 steps with a railing?: 1  Total Score: 16       Therapeutic Activities and Exercises:   Pt performed BLE therapeutic exercises at all joints in available planes of motion with rest breaks as needed to increase strength and endurance with all gross mobility skills.    Patient left up in chair with all lines intact, call button in reach, NSG notified and family present..    GOALS:    Physical Therapy Goals        Problem: Physical Therapy Goal    Goal Priority Disciplines Outcome Goal Variances Interventions   Physical Therapy Goal     PT/OT, PT      Description:  Goals to be met by: 2018     Patient will increase functional independence with mobility by performin. Supine to sit with Stand-by Assistance  2. Sit to supine with Stand-by Assistance  3. Rolling to Left and Right with Stand-by Assistance.  4. Sit to stand transfer with Stand-by Assistance, using bronson-walker  5. Bed to chair transfer with Stand-by Assistance using Bronson-walker  6. Gait  x 50 feet with Stand-by Assistance using Bronson-walker.              Problem: Physical Therapy Goal    Goal Priority Disciplines Outcome Goal Variances Interventions   Physical Therapy Goal      PT/OT, PT      Description:  Ongoing efforts  to achieved previously documented goals           Problem: Physical Therapy Goal    Goal Priority Disciplines Outcome Goal Variances Interventions   Physical Therapy Goal     PT/OT, PT Ongoing (interventions implemented as appropriate)     Description:  1. Pt will demonstrate hemiwalker use for 25 ft - WBAT Left lower extremiity with minimal assistance for safety                    Time Tracking:     PT Received On: 05/23/18  PT Start Time: 1250     PT Stop Time: 1313  PT Total Time (min): 23 min     Billable Minutes: Gait Training 13 minutes and Therapeutic Exercise 10 minutes    Treatment Type: Treatment  PT/PTA: PT           Paige Ramirez, PT  05/23/2018

## 2018-05-23 NOTE — PT/OT/SLP PROGRESS
"Physical Therapy Treatment    Patient Name:  Merari Romero   MRN:  [id number]    Recommendations:     Discharge Recommendations:  home with home health, home health PT   Discharge Equipment Recommendations: bedside commode, bath bench, walker, flora, wheelchair   Barriers to discharge: None    Assessment:     Merari Romero is a 78 y.o. female admitted with a medical diagnosis of <principal problem not specified>.  She presents with the following impairments/functional limitations:  weakness, impaired endurance, impaired self care skills, impaired functional mobilty, gait instability, impaired balance, decreased upper extremity function, decreased lower extremity function, decreased safety awareness . Pt progressing well with POC goals. Pt with improving strength and endurance with all gross mobility skills.    Rehab Prognosis:  Good; patient would benefit from acute skilled PT services to address these deficits and reach maximum level of function.      Recent Surgery: * No surgery found *      Plan:     During this hospitalization, patient to be seen  (1-2x/day Monday-Friday; PRN Weekends/Holidays) to address the above listed problems via gait training, therapeutic activities, therapeutic exercises  · Plan of Care Expires:   (upon D/C from facility)   Plan of Care Reviewed with: patient, spouse, daughter    Subjective     Communicated with  patient prior to session.  Patient found supine in bed upon PT entry to room, agreeable to treatment.      Chief Complaint: "I am feeling better"  Patient comments/goals: "Go home"  Pain/Comfort:  · Pain Rating 1: 0/10    Patients cultural, spiritual, Anabaptist conflicts given the current situation:      Objective:     Patient found with:       General Precautions: Standard, fall   Orthopedic Precautions:LUE non weight bearing, LLE weight bearing as tolerated   Braces:  (LUE soft cast)     Functional Mobility:  · Bed Mobility:     · Rolling Left:  stand by assistance  · Rolling Right: " stand by assistance  · Scooting: stand by assistance  · Supine to Sit: minimum assistance  · Sit to Supine: minimum assistance  · Transfers:     · Sit to Stand:  contact guard assistance with hemiwalker  · Bed to Chair: contact guard assistance with  hemiwalker  using  Step Transfer  · Gait: Gait training x 45 feet with hemiwalker and CGA with cueing for gait sequencing and proper use of AD to decrease pt fall risk.       AM-PAC 6 CLICK MOBILITY  Turning over in bed (including adjusting bedclothes, sheets and blankets)?: 3  Sitting down on and standing up from a chair with arms (e.g., wheelchair, bedside commode, etc.): 3  Moving from lying on back to sitting on the side of the bed?: 3  Moving to and from a bed to a chair (including a wheelchair)?: 3  Need to walk in hospital room?: 3  Climbing 3-5 steps with a railing?: 1  Total Score: 16       Patient left supine with all lines intact, call button in reach, NSG notified and family present..    GOALS:    Physical Therapy Goals        Problem: Physical Therapy Goal    Goal Priority Disciplines Outcome Goal Variances Interventions   Physical Therapy Goal     PT/OT, PT      Description:  Goals to be met by: 2018     Patient will increase functional independence with mobility by performin. Supine to sit with Stand-by Assistance  2. Sit to supine with Stand-by Assistance  3. Rolling to Left and Right with Stand-by Assistance.  4. Sit to stand transfer with Stand-by Assistance, using bronson-walker  5. Bed to chair transfer with Stand-by Assistance using Bronson-walker  6. Gait  x 50 feet with Stand-by Assistance using Bronson-walker.              Problem: Physical Therapy Goal    Goal Priority Disciplines Outcome Goal Variances Interventions   Physical Therapy Goal     PT/OT, PT      Description:  Ongoing efforts  to achieved previously documented goals           Problem: Physical Therapy Goal    Goal Priority Disciplines Outcome Goal Variances Interventions    Physical Therapy Goal     PT/OT, PT Ongoing (interventions implemented as appropriate)     Description:  1. Pt will demonstrate hemiwalker use for 25 ft - WBAT Left lower extremiity with minimal assistance for safety                    Time Tracking:     PT Received On: 05/23/18  PT Start Time: 1032     PT Stop Time: 1055  PT Total Time (min): 23 min     Billable Minutes: Gait Training 13 minutes and Therapeutic Activity 10 minutes    Treatment Type: Treatment  PT/PTA: PT           Paige Ramirez, PT  05/23/2018

## 2018-05-23 NOTE — NURSING
Daughter reports her mother has been up sitting in wheelchair in front lobby and is now returning to rest prior to physical therapy.

## 2018-05-24 PROBLEM — M25.552 LEFT HIP PAIN: Status: ACTIVE | Noted: 2018-05-24

## 2018-05-24 PROBLEM — M79.662 PAIN IN LEFT LOWER LEG: Status: ACTIVE | Noted: 2018-05-24

## 2018-05-24 LAB
BASOPHILS # BLD AUTO: 0.04 K/UL
BASOPHILS NFR BLD: 0.8 %
DIFFERENTIAL METHOD: ABNORMAL
EOSINOPHIL # BLD AUTO: 0.3 K/UL
EOSINOPHIL NFR BLD: 6.2 %
ERYTHROCYTE [DISTWIDTH] IN BLOOD BY AUTOMATED COUNT: 14.5 %
HCT VFR BLD AUTO: 36.2 %
HGB BLD-MCNC: 12 G/DL
LYMPHOCYTES # BLD AUTO: 1.8 K/UL
LYMPHOCYTES NFR BLD: 37.8 %
MCH RBC QN AUTO: 29.6 PG
MCHC RBC AUTO-ENTMCNC: 33.1 G/DL
MCV RBC AUTO: 89 FL
MONOCYTES # BLD AUTO: 0.3 K/UL
MONOCYTES NFR BLD: 5.8 %
NEUTROPHILS # BLD AUTO: 2.4 K/UL
NEUTROPHILS NFR BLD: 49.4 %
PLATELET # BLD AUTO: 437 K/UL
PMV BLD AUTO: 9.7 FL
RBC # BLD AUTO: 4.05 M/UL
WBC # BLD AUTO: 4.81 K/UL

## 2018-05-24 PROCEDURE — 25000003 PHARM REV CODE 250: Performed by: NURSE PRACTITIONER

## 2018-05-24 PROCEDURE — 94640 AIRWAY INHALATION TREATMENT: CPT

## 2018-05-24 PROCEDURE — 99232 SBSQ HOSP IP/OBS MODERATE 35: CPT | Mod: ,,, | Performed by: FAMILY MEDICINE

## 2018-05-24 PROCEDURE — A4216 STERILE WATER/SALINE, 10 ML: HCPCS | Performed by: FAMILY MEDICINE

## 2018-05-24 PROCEDURE — 94760 N-INVAS EAR/PLS OXIMETRY 1: CPT

## 2018-05-24 PROCEDURE — 94761 N-INVAS EAR/PLS OXIMETRY MLT: CPT

## 2018-05-24 PROCEDURE — 11000004 HC SNF PRIVATE

## 2018-05-24 PROCEDURE — 97110 THERAPEUTIC EXERCISES: CPT

## 2018-05-24 PROCEDURE — 97530 THERAPEUTIC ACTIVITIES: CPT

## 2018-05-24 PROCEDURE — 85025 COMPLETE CBC W/AUTO DIFF WBC: CPT

## 2018-05-24 PROCEDURE — 25000003 PHARM REV CODE 250: Performed by: FAMILY MEDICINE

## 2018-05-24 PROCEDURE — 25000242 PHARM REV CODE 250 ALT 637 W/ HCPCS: Performed by: NURSE PRACTITIONER

## 2018-05-24 PROCEDURE — 36415 COLL VENOUS BLD VENIPUNCTURE: CPT

## 2018-05-24 PROCEDURE — 63600175 PHARM REV CODE 636 W HCPCS: Performed by: NURSE PRACTITIONER

## 2018-05-24 RX ORDER — IBUPROFEN 600 MG/1
600 TABLET ORAL ONCE
Status: COMPLETED | OUTPATIENT
Start: 2018-05-24 | End: 2018-05-24

## 2018-05-24 RX ORDER — METHOCARBAMOL 500 MG/1
500 TABLET, FILM COATED ORAL ONCE
Status: COMPLETED | OUTPATIENT
Start: 2018-05-24 | End: 2018-05-24

## 2018-05-24 RX ADMIN — METHOCARBAMOL 500 MG: 500 TABLET ORAL at 03:05

## 2018-05-24 RX ADMIN — DOCUSATE SODIUM 100 MG: 100 CAPSULE, LIQUID FILLED ORAL at 08:05

## 2018-05-24 RX ADMIN — ACETAMINOPHEN 650 MG: 325 TABLET ORAL at 08:05

## 2018-05-24 RX ADMIN — SODIUM CHLORIDE, PRESERVATIVE FREE 10 ML: 5 INJECTION INTRAVENOUS at 08:05

## 2018-05-24 RX ADMIN — MUPIROCIN: 20 OINTMENT TOPICAL at 10:05

## 2018-05-24 RX ADMIN — AMLODIPINE BESYLATE 5 MG: 5 TABLET ORAL at 08:05

## 2018-05-24 RX ADMIN — HYDROCODONE BITARTRATE AND ACETAMINOPHEN 1 TABLET: 5; 325 TABLET ORAL at 05:05

## 2018-05-24 RX ADMIN — HYDROCODONE BITARTRATE AND ACETAMINOPHEN 1 TABLET: 5; 325 TABLET ORAL at 09:05

## 2018-05-24 RX ADMIN — ATORVASTATIN CALCIUM 20 MG: 20 TABLET, FILM COATED ORAL at 08:05

## 2018-05-24 RX ADMIN — IBUPROFEN 600 MG: 600 TABLET ORAL at 01:05

## 2018-05-24 RX ADMIN — ENOXAPARIN SODIUM 40 MG: 100 INJECTION SUBCUTANEOUS at 05:05

## 2018-05-24 RX ADMIN — PANTOPRAZOLE SODIUM 40 MG: 40 TABLET, DELAYED RELEASE ORAL at 08:05

## 2018-05-24 RX ADMIN — DULOXETINE 30 MG: 30 CAPSULE, DELAYED RELEASE ORAL at 08:05

## 2018-05-24 RX ADMIN — IPRATROPIUM BROMIDE AND ALBUTEROL SULFATE 3 ML: 2.5; .5 SOLUTION RESPIRATORY (INHALATION) at 07:05

## 2018-05-24 RX ADMIN — AMITRIPTYLINE HYDROCHLORIDE 25 MG: 25 TABLET, FILM COATED ORAL at 08:05

## 2018-05-24 RX ADMIN — METOPROLOL SUCCINATE 50 MG: 50 TABLET, EXTENDED RELEASE ORAL at 08:05

## 2018-05-24 NOTE — PT/OT/SLP PROGRESS
Occupational Therapy   Treatment    Name: Merari Romero  MRN: [id number]  Admitting Diagnosis:  <principal problem not specified>       Recommendations:     Discharge Recommendations: rehabilitation facility  Discharge Equipment Recommendations:  bedside commode, bath bench, walker, flora  Barriers to discharge:  None    Subjective     Communicated with: nsg prior to session.  Pain/Comfort:  · Pain Rating 1: 6/10  · Location - Side 1: Left  · Location - Orientation 1: lower  · Location 1: foot  · Pain Addressed 1: Pre-medicate for activity, Nurse notified  · Pain Rating Post-Intervention 1: 5/10    Patients cultural, spiritual, Yarsanism conflicts given the current situation: none voiced    Objective:     Patient found: Seated EOB with daughter present in the room.    General Precautions: Standard, fall   Orthopedic Precautions:LUE non weight bearing, LLE weight bearing as tolerated   Braces:  (soft cast LUE)     Occupational Performance:    Functional Mobility/Transfers:  · Patient completed Sit <> Stand Transfer with contact guard assistance  with  hemiwalker       Patient left seated at EOB with call button in reach, nursing notified and daughter present    [facility] 6 Click:  [facility] Total Score:      Treatment & Education:  Red theraband IR 2 x 10 reps  RED theraband ER x 10 reps; 0# ER (w/o theraband x 10 reps)  0# FF 2 x 10 reps  Bed push ups using side railing 2 x 10 reps  Education:    Assessment:     Merari Romero is a 78 y.o. female with a medical diagnosis of <principal problem not specified>.  Attempted standing for functional mobility, but Pt with increase pain in (L) foot that nursing is already aware of.  Exercises performed to increase (L) UE strength for increase bed mobility and t/f due to inability to use (R) UE at this time. Pt with difficulty performing ER and FF with RED theraband, therefore performed without.  Performance deficits affecting function are impaired endurance, impaired self care skills,  impaired functional mobilty, impaired balance, decreased upper extremity function, pain.      Rehab Prognosis:  good; patient would benefit from acute skilled OT services to address these deficits and reach maximum level of function.       Plan:     Patient to be seen 3 x/week, 5 x/week to address the above listed problems via self-care/home management, therapeutic activities, therapeutic exercises  · Plan of Care Expires: 05/28/18  · Plan of Care Reviewed with: patient, daughter    This Plan of care has been discussed with the patient who was involved in its development and understands and is in agreement with the identified goals and treatment plan    GOALS:    Occupational Therapy Goals        Problem: Occupational Therapy Goal    Goal Priority Disciplines Outcome Interventions   Occupational Therapy Goal     OT, PT/OT Ongoing (interventions implemented as appropriate)    Description:  Goals to be met by: 05/24/2018     Patient will increase functional independence with ADLs by performing:    UE Dressing to be assessed (goal met 5/18/2018)  LE Dressing with Minimal Assistance. (goal partially met with socks)  Toilet transfer to bedside commode with Contact Guard Assistance.    Updated goals 5/21/2018: Will continue to work on goals 2 and 3 above and add  UE dressing with SBA                       Time Tracking:     OT Date of Treatment: 05/24/18  OT Start Time: 0306  OT Stop Time: 0323  OT Total Time (min): 17 min    Billable Minutes:Therapeutic Exercise 17 min    Ani Dueñas OT  5/24/2018

## 2018-05-24 NOTE — PT/OT/SLP PROGRESS
Physical Therapy      Patient Name:  Merari Romero   MRN:  [id number]    Patient not seen 5/24/2018 a.m. secondary to Other (Comment) (Ultrasound). Will follow-up 5/24/2018 p.m.    Dipak Lemos PT

## 2018-05-24 NOTE — SUBJECTIVE & OBJECTIVE
Interval History: see      Review of Systems   Constitutional: Negative for chills and fever.   Respiratory: Negative for shortness of breath.    Cardiovascular: Negative for chest pain and palpitations.   Gastrointestinal: Negative for abdominal pain, blood in stool, constipation and nausea.   Genitourinary: Negative for difficulty urinating and pelvic pain.   Musculoskeletal: Positive for arthralgias (left ankle) and myalgias.        Left hip pain with moving as well as left arm pain with moving   Skin:        Left middle toe bruised   Psychiatric/Behavioral: Negative for dysphoric mood, sleep disturbance and suicidal ideas. The patient is not nervous/anxious.      Objective:     Vital Signs (Most Recent):  Temp: 97.1 °F (36.2 °C) (05/24/18 1119)  Pulse: 78 (05/24/18 1119)  Resp: 18 (05/24/18 1119)  BP: (!) 123/59 (05/24/18 1119)  SpO2: (!) 94 % (05/24/18 1119) Vital Signs (24h Range):  Temp:  [96.1 °F (35.6 °C)-98.4 °F (36.9 °C)] 97.1 °F (36.2 °C)  Pulse:  [64-82] 78  Resp:  [17-18] 18  SpO2:  [92 %-99 %] 94 %  BP: (123-150)/(59-74) 123/59     Weight: 64 kg (141 lb 1.5 oz) (No New\)  Body mass index is 24.99 kg/m².    Intake/Output Summary (Last 24 hours) at 05/24/18 1133  Last data filed at 05/24/18 0955   Gross per 24 hour   Intake               10 ml   Output              904 ml   Net             -894 ml      Physical Exam   Constitutional: She is oriented to person, place, and time. She appears well-developed. No distress.   HENT:   Head: Normocephalic and atraumatic.   Eyes: Conjunctivae are normal. Pupils are equal, round, and reactive to light.   Neck: Normal range of motion. Neck supple. No thyromegaly present.   Cardiovascular: Normal rate, regular rhythm, normal heart sounds and intact distal pulses.    Pulmonary/Chest: Effort normal and breath sounds normal. No respiratory distress. She has no wheezes.   Abdominal: Soft. Bowel sounds are normal. There is no tenderness.   Musculoskeletal: She exhibits  tenderness (hip bilaterally. + left with rotation). She exhibits no edema.   Left sling in place    Left foot with MTP arthritic changes     +Homans   Lymphadenopathy:     She has no cervical adenopathy.   Neurological: She is alert and oriented to person, place, and time.   Skin: Skin is warm and dry. No rash noted.   Psychiatric: She has a normal mood and affect. Her behavior is normal.   Nursing note and vitals reviewed.      Significant Labs:   Lab Results   Component Value Date    WBC 4.81 05/24/2018    HGB 12.0 05/24/2018    HCT 36.2 (L) 05/24/2018    MCV 89 05/24/2018     (H) 05/24/2018       Significant Imaging:     Left hip x ray No definite evidence of a displaced fracture or dislocation.    The symphysis pubis is limited to evaluation due to angulation and positioning and a pubis fracture cannot be excluded..    CXR 2.3 cm confluence of shadows overlying the left upper lobe.  Consider nonurgent follow-up chest with lateral for further evaluation  .  REPEAT CXR   Chronic lung changes are seen bilaterally.  Focal region of scarring is seen in the left suprahilar location, less prominent as compared to the previous study of 05/11/2018.  No consolidation or pleural effusions.  The heart is normal in size.  Calcified atheromatous disease affects the aorta.  The bones are osteopenic and show age-appropriate degenerative change.    Left elbow x ray There is a fracture involving the radial neck.  Posterior fat pad is present compatible with an effusion.    Left hip x rays repeat  There are fractures involving the left symphysis pubis and the inferior ramus which were limited to evaluation on the prior study.    No evidence of a fracture of the left hip.  No dislocation.  Vascular calcifications overlying the left femoral head and neck.        CRANIAL NERVES     CN III, IV, VI   Pupils are equal, round, and reactive to light.

## 2018-05-24 NOTE — ASSESSMENT & PLAN NOTE
US of Left LE - rule out DVT (though unlikely, she had a +homans and calf tenderness).  If neg, go ahead with dose of nsaid x 1 and robaxin.

## 2018-05-24 NOTE — ASSESSMENT & PLAN NOTE
Treat pain - known fracture. Can bear weight.  With worsening pain, may consider repeat imaging if she can't bear weight on hip, though.

## 2018-05-24 NOTE — PLAN OF CARE
Problem: Patient Care Overview  Goal: Plan of Care Review  Outcome: Ongoing (interventions implemented as appropriate)  Afebrile.. Safety and comfort maintained. toradol IV given once. PT/OT. Sling to left arm D/I. Daughter at bedside. Call bell in reach. No further questions. Agrees with plan of care.

## 2018-05-24 NOTE — PT/OT/SLP PROGRESS
"Physical Therapy Treatment    Patient Name:  Merari Romero   MRN:  [id number]    Recommendations:     Discharge Recommendations:  home with home health, home health PT   Discharge Equipment Recommendations: walker, flora, bedside commode   Barriers to discharge: None    Assessment:     Merari Romero is a 78 y.o. female admitted with a medical diagnosis of <principal problem not specified>.  She presents with the following impairments/functional limitations:  weakness, gait instability, impaired balance, impaired endurance, decreased lower extremity function, decreased safety awareness, pain, impaired functional mobilty, orthopedic precautions .  Pt. presents with increased distal LLE pain today affecting mobility.    Rehab Prognosis:  Fair+; patient would benefit from acute skilled PT services to address these deficits and reach maximum level of function.      Recent Surgery: * No surgery found *      Plan:     During this hospitalization, patient to be seen  (1-2x/day(M-F); PRN(Sat.,Sun.)) to address the above listed problems via therapeutic exercises, therapeutic activities, gait training  · Plan of Care Expires:   (Upon d/c from facility)   Plan of Care Reviewed with: patient    Subjective     Communicated with patient prior to session.  Patient found seated EOB upon PT entry to room, agreeable to treatment.        Patient comments/goals: "I don't know why my leg hurts so much today."  Pain/Comfort:  · Pain Rating 1: 5/10  · Location - Side 1: Left  · Location - Orientation 1: distal  · Location 1: leg  · Pain Addressed 1: Nurse notified, Reposition, Cessation of Activity, Distraction  · Pain Rating Post-Intervention 1: 3/10    Patients cultural, spiritual, Restorationist conflicts given the current situation: none verbalized    Objective:     Patient found with:       General Precautions: Standard, fall   Orthopedic Precautions:LUE non weight bearing, LLE weight bearing as tolerated   Braces: N/A     Functional " Mobility:  · Bed Mobility:     · Rolling Left:  stand by assistance  · Rolling Right: stand by assistance  · Scooting: stand by assistance  · Bridging: stand by assistance  · Supine to Sit: stand by assistance  · Sit to Supine: contact guard assistance  · Transfers:     · Sit to Stand:  contact guard assistance with hemiwalker  Balance:    Static Sit: FAIR+: Able to take MINIMAL challenges from all directions  Dynamic Sit: FAIR+: Maintains balance through MINIMAL excursions of active trunk motion  Static Stand: POOR+: Needs MINIMAL assist to maintain  · Dynamic stand: POOR: N/A  ·       AM-PAC 6 CLICK MOBILITY  Turning over in bed (including adjusting bedclothes, sheets and blankets)?: 3  Sitting down on and standing up from a chair with arms (e.g., wheelchair, bedside commode, etc.): 3  Moving from lying on back to sitting on the side of the bed?: 3  Moving to and from a bed to a chair (including a wheelchair)?: 3  Need to walk in hospital room?: 3  Climbing 3-5 steps with a railing?: 1  Total Score: 16       Therapeutic Activities and Exercises:   There. Act.:  Weight shifting and weight acceptance tasks performed in standing with min. A.  Trunk control tasks performed with min. A. while pt. seated EOB and in bedside chair. Transfer Training performed with assistance as noted.  VC's and manual guidance given to pt. for sequencing.     There. Ex.:  Pt. Performed gentle A/A exercises of BLE's while supine in bed, for N.M. Tone and strength and fluid dynamics.  BLE: quad sets, glut sets, SAQ, heel slides, hip abd/add (3x10) performed with pt.    Patient left HOB elevated with all lines intact, call button in reach, RN notified and family present..    GOALS:    Physical Therapy Goals        Problem: Physical Therapy Goal    Goal Priority Disciplines Outcome Goal Variances Interventions   Physical Therapy Goal     PT/OT, PT      Description:  Goals to be met by: 5/31/2018     Patient will increase functional  independence with mobility by performin. Supine to sit with Stand-by Assistance  2. Sit to supine with Stand-by Assistance  3. Rolling to Left and Right with Stand-by Assistance.  4. Sit to stand transfer with Stand-by Assistance, using bronson-walker  5. Bed to chair transfer with Stand-by Assistance using Bronson-walker  6. Gait  x 50 feet with Stand-by Assistance using Bronson-walker.              Problem: Physical Therapy Goal    Goal Priority Disciplines Outcome Goal Variances Interventions   Physical Therapy Goal     PT/OT, PT      Description:  Ongoing efforts  to achieved previously documented goals           Problem: Physical Therapy Goal    Goal Priority Disciplines Outcome Goal Variances Interventions   Physical Therapy Goal     PT/OT, PT Ongoing (interventions implemented as appropriate)     Description:  1. Pt will demonstrate hemiwalker use for 25 ft - WBAT Left lower extremiity with minimal assistance for safety                    Time Tracking:     PT Received On: 18  PT Start Time: 1308     PT Stop Time: 1333  PT Total Time (min): 25 min     Billable Minutes: Therapeutic Activity 9 minutes and Therapeutic Exercise 16 minutes    Treatment Type: Treatment  PT/PTA: PT           Dipak Lemos, PT  2018

## 2018-05-24 NOTE — PROGRESS NOTES
[facility] Hospital Medicine  Progress Note    Patient Name: Merari Romero  MRN: [id number]  Patient Class: IP- Swing   Admission Date: 5/17/2018  Length of Stay: 7 days  Attending Physician: Michael Chacon MD  Primary Care Provider: Shaun Barraza MD        Subjective:     Principal Problem:<principal problem not specified>    HPI:  5/18/18 SWING ADMIT  78 year old female admitted 5/11/18 for Closed nondisplaced fracture of neck of left radius after a falll going up stairs, slipped on first step.. She was admitted for pain control and PT. She is progressing with PT but slow. Did not qualify for inpatient rehab as can not tolerate 3hr of activity.    SWING placement yesterday for continued PT/OT . She was living at home with  prior to fall and able to take care of self and him.  Patient had a restful night. VS stable. Some  C/o left sided pelvic pain, relieved with hydrocodone x2 and morphine x1 for breakthrough pain. Ace wrap and sling to left arm intact. She reports she tolerated PT well yesterday.  Encouraged continue IS.    and daughter at bedside, supportive. Pt. amb. 10' with hemiwalker, mod. A. No c/o SOB or fevers this morning.    Hospital Course:  5/21/18  She is doing well. Skin tears healing well. She is ambulating 20 ft with PT using rolling walker. Her goal is 50 ft. She has only used norco PO over last 24hr to control her pain. She remains on RA with POX 93-96%. Has IS at bedside. Using PRN. No fever. On lovonox SQ for DVT prophylaxis. Daughter at bedside reports that they would like to take her home rather than send her to INPT rehab if she is strong enough by D/c    5/23/18  Yesterday did well with therapy. Using stand by assist she was able to do all bed mobility, and used contact guard assist to transfer from sit to stand and ambulated with the same assist using flora walker 15 ft. This is better than the weekend where she was still requiring min assist per PT.  She is progressing towards her goal of stand by assist 50ft using flora walker.     She has not required morphine for breakthrough pain since the 5/21/18. Using norco PRN for pain.     VSS/afebrile, maintaining sats on RA, IS at bedside and nebs BID. lovenox daily     C/o left foot pain today. She reports that the the top of foot to her knee. Has hx of arthritis. Just started yesterday Pain meds help the pain in foot as well as hip  5/24/18 Brief MD; pt with c/o persistent Left LE pain; tender and hurts with movement; also notes left hip pain with rotation. She cannot dorsiflex without pain and it is limiting her ability to ambulate/participate in PT    Interval History: see HC     Review of Systems   Constitutional: Negative for chills and fever.   Respiratory: Negative for shortness of breath.    Cardiovascular: Negative for chest pain and palpitations.   Gastrointestinal: Negative for abdominal pain, blood in stool, constipation and nausea.   Genitourinary: Negative for difficulty urinating and pelvic pain.   Musculoskeletal: Positive for arthralgias (left ankle) and myalgias.        Left hip pain with moving as well as left arm pain with moving   Skin:        Left middle toe bruised   Psychiatric/Behavioral: Negative for dysphoric mood, sleep disturbance and suicidal ideas. The patient is not nervous/anxious.      Objective:     Vital Signs (Most Recent):  Temp: 97.1 °F (36.2 °C) (05/24/18 1119)  Pulse: 78 (05/24/18 1119)  Resp: 18 (05/24/18 1119)  BP: (!) 123/59 (05/24/18 1119)  SpO2: (!) 94 % (05/24/18 1119) Vital Signs (24h Range):  Temp:  [96.1 °F (35.6 °C)-98.4 °F (36.9 °C)] 97.1 °F (36.2 °C)  Pulse:  [64-82] 78  Resp:  [17-18] 18  SpO2:  [92 %-99 %] 94 %  BP: (123-150)/(59-74) 123/59     Weight: 64 kg (141 lb 1.5 oz) (No New\)  Body mass index is 24.99 kg/m².    Intake/Output Summary (Last 24 hours) at 05/24/18 1133  Last data filed at 05/24/18 0955   Gross per 24 hour   Intake               10 ml   Output               904 ml   Net             -894 ml      Physical Exam   Constitutional: She is oriented to person, place, and time. She appears well-developed. No distress.   HENT:   Head: Normocephalic and atraumatic.   Eyes: Conjunctivae are normal. Pupils are equal, round, and reactive to light.   Neck: Normal range of motion. Neck supple. No thyromegaly present.   Cardiovascular: Normal rate, regular rhythm, normal heart sounds and intact distal pulses.    Pulmonary/Chest: Effort normal and breath sounds normal. No respiratory distress. She has no wheezes.   Abdominal: Soft. Bowel sounds are normal. There is no tenderness.   Musculoskeletal: She exhibits tenderness (hip bilaterally. + left with rotation). She exhibits no edema.   Left sling in place    Left foot with MTP arthritic changes     +Homans   Lymphadenopathy:     She has no cervical adenopathy.   Neurological: She is alert and oriented to person, place, and time.   Skin: Skin is warm and dry. No rash noted.   Psychiatric: She has a normal mood and affect. Her behavior is normal.   Nursing note and vitals reviewed.      Significant Labs:   Lab Results   Component Value Date    WBC 4.81 05/24/2018    HGB 12.0 05/24/2018    HCT 36.2 (L) 05/24/2018    MCV 89 05/24/2018     (H) 05/24/2018       Significant Imaging:     Left hip x ray No definite evidence of a displaced fracture or dislocation.    The symphysis pubis is limited to evaluation due to angulation and positioning and a pubis fracture cannot be excluded..    CXR 2.3 cm confluence of shadows overlying the left upper lobe.  Consider nonurgent follow-up chest with lateral for further evaluation  .  REPEAT CXR   Chronic lung changes are seen bilaterally.  Focal region of scarring is seen in the left suprahilar location, less prominent as compared to the previous study of 05/11/2018.  No consolidation or pleural effusions.  The heart is normal in size.  Calcified atheromatous disease affects the  aorta.  The bones are osteopenic and show age-appropriate degenerative change.    Left elbow x ray There is a fracture involving the radial neck.  Posterior fat pad is present compatible with an effusion.    Left hip x rays repeat  There are fractures involving the left symphysis pubis and the inferior ramus which were limited to evaluation on the prior study.    No evidence of a fracture of the left hip.  No dislocation.  Vascular calcifications overlying the left femoral head and neck.        CRANIAL NERVES     CN III, IV, VI   Pupils are equal, round, and reactive to light.        Assessment/Plan:      Pain in left lower leg      US of Left LE - rule out DVT (though unlikely, she had a +homans and calf tenderness).  If neg, go ahead with dose of nsaid x 1 and robaxin.        Left hip pain    Treat pain - known fracture. Can bear weight.  With worsening pain, may consider repeat imaging if she can't bear weight on hip, though.          Inadequate dietary energy intake              Debility    Continue PT/OT  IS encouraged.         Fall    Fear of falling. Cont with PT/OT          Urinary hesitancy    U/ a and culture- all normal  Better now that she can get up to BEDS SIDE commode          History of deep venous thrombosis    lovenox 40 daily due to hx of DVT and immobility,    Recheck left leg today to r/o clot - this was normal.        Closed fracture of left pubis    Non operative   Pain control try and cont po as much as poss  Try and reduce risk of associated complications of limited mobility(IS, nebs, PT)  Discussed with [facility]ON[facility] ortho. Seen patient 5/16, cont non wt bearing to left upper ext with long arm splint and wt bearing as tolerated to left lower ext. F/u Dr Lemos 3 weeks for repeat films  Cont PT till safe for D/C to home        Closed nondisplaced fracture of neck of left radius    Ortho consulted via ER, non surgical orthopedic injuries per Dr Doc Wolf, non wt bearing injury to left upper ext  Splinted   She also can wt bear as tolerated to left lower ext   Bo Urbina NP with ortho came 5/16, no changes, f/u Aminta 3 weeks for repeat films  Undressed splint 5/21 to assess skin tears, healing well, no s/s infection. Will undress biweekly and apply bactroban          Hyperlipidemia LDL goal <70    Cont lipitor        OA (osteoarthritis)    Give small dose toradol today - this didn't help.  Try advil.          Neuropathy of lower extremity    Cont cymbalta and amitriptyline may consider lyrica at home          HTN (hypertension)    /59  Well controlled on amlodipine and toprol          VTE Risk Mitigation         Ordered     enoxaparin injection 40 mg  Daily      05/17/18 1159     IP VTE HIGH RISK PATIENT  Once      05/17/18 1158     Place sequential compression device  Until discontinued      05/17/18 1158              Camille Gauthier MD  Department of Hospital Medicine   [facility]

## 2018-05-24 NOTE — PLAN OF CARE
Skilled conference held. Patient approved for 4 more days skilled nursing. Discharge planned for Monday May 28th. Patient and family updated.

## 2018-05-24 NOTE — ASSESSMENT & PLAN NOTE
lovenox 40 daily due to hx of DVT and immobility,    Recheck left leg today to r/o clot - this was normal.

## 2018-05-25 LAB — URATE SERPL-MCNC: 4.6 MG/DL

## 2018-05-25 PROCEDURE — 97110 THERAPEUTIC EXERCISES: CPT

## 2018-05-25 PROCEDURE — 25000003 PHARM REV CODE 250: Performed by: FAMILY MEDICINE

## 2018-05-25 PROCEDURE — 25000242 PHARM REV CODE 250 ALT 637 W/ HCPCS: Performed by: NURSE PRACTITIONER

## 2018-05-25 PROCEDURE — 63600175 PHARM REV CODE 636 W HCPCS: Performed by: NURSE PRACTITIONER

## 2018-05-25 PROCEDURE — 84550 ASSAY OF BLOOD/URIC ACID: CPT

## 2018-05-25 PROCEDURE — 97530 THERAPEUTIC ACTIVITIES: CPT

## 2018-05-25 PROCEDURE — A4216 STERILE WATER/SALINE, 10 ML: HCPCS | Performed by: FAMILY MEDICINE

## 2018-05-25 PROCEDURE — 99232 SBSQ HOSP IP/OBS MODERATE 35: CPT | Mod: ,,, | Performed by: FAMILY MEDICINE

## 2018-05-25 PROCEDURE — 94761 N-INVAS EAR/PLS OXIMETRY MLT: CPT

## 2018-05-25 PROCEDURE — 97803 MED NUTRITION INDIV SUBSEQ: CPT

## 2018-05-25 PROCEDURE — 11000004 HC SNF PRIVATE

## 2018-05-25 PROCEDURE — 36415 COLL VENOUS BLD VENIPUNCTURE: CPT

## 2018-05-25 PROCEDURE — 94640 AIRWAY INHALATION TREATMENT: CPT

## 2018-05-25 PROCEDURE — 25000003 PHARM REV CODE 250: Performed by: NURSE PRACTITIONER

## 2018-05-25 RX ORDER — ALBUMIN HUMAN 50 G/1000ML
25 SOLUTION INTRAVENOUS ONCE
Status: CANCELLED | OUTPATIENT
Start: 2018-05-25 | End: 2018-05-25

## 2018-05-25 RX ADMIN — DULOXETINE 30 MG: 30 CAPSULE, DELAYED RELEASE ORAL at 07:05

## 2018-05-25 RX ADMIN — SODIUM CHLORIDE, PRESERVATIVE FREE 10 ML: 5 INJECTION INTRAVENOUS at 08:05

## 2018-05-25 RX ADMIN — DOCUSATE SODIUM 100 MG: 100 CAPSULE, LIQUID FILLED ORAL at 07:05

## 2018-05-25 RX ADMIN — METOPROLOL SUCCINATE 50 MG: 50 TABLET, EXTENDED RELEASE ORAL at 07:05

## 2018-05-25 RX ADMIN — AMITRIPTYLINE HYDROCHLORIDE 25 MG: 25 TABLET, FILM COATED ORAL at 08:05

## 2018-05-25 RX ADMIN — DOCUSATE SODIUM 100 MG: 100 CAPSULE, LIQUID FILLED ORAL at 08:05

## 2018-05-25 RX ADMIN — HYDROCODONE BITARTRATE AND ACETAMINOPHEN 1 TABLET: 5; 325 TABLET ORAL at 03:05

## 2018-05-25 RX ADMIN — ATORVASTATIN CALCIUM 20 MG: 20 TABLET, FILM COATED ORAL at 07:05

## 2018-05-25 RX ADMIN — PANTOPRAZOLE SODIUM 40 MG: 40 TABLET, DELAYED RELEASE ORAL at 07:05

## 2018-05-25 RX ADMIN — HYDROCODONE BITARTRATE AND ACETAMINOPHEN 1 TABLET: 5; 325 TABLET ORAL at 06:05

## 2018-05-25 RX ADMIN — IPRATROPIUM BROMIDE AND ALBUTEROL SULFATE 3 ML: 2.5; .5 SOLUTION RESPIRATORY (INHALATION) at 07:05

## 2018-05-25 RX ADMIN — ACETAMINOPHEN 650 MG: 325 TABLET ORAL at 10:05

## 2018-05-25 RX ADMIN — MUPIROCIN: 20 OINTMENT TOPICAL at 09:05

## 2018-05-25 RX ADMIN — HYDROCODONE BITARTRATE AND ACETAMINOPHEN 1 TABLET: 5; 325 TABLET ORAL at 11:05

## 2018-05-25 RX ADMIN — IPRATROPIUM BROMIDE AND ALBUTEROL SULFATE 3 ML: 2.5; .5 SOLUTION RESPIRATORY (INHALATION) at 06:05

## 2018-05-25 RX ADMIN — ENOXAPARIN SODIUM 40 MG: 100 INJECTION SUBCUTANEOUS at 05:05

## 2018-05-25 RX ADMIN — AMLODIPINE BESYLATE 5 MG: 5 TABLET ORAL at 07:05

## 2018-05-25 RX ADMIN — HYDROCODONE BITARTRATE AND ACETAMINOPHEN 1 TABLET: 5; 325 TABLET ORAL at 07:05

## 2018-05-25 NOTE — PLAN OF CARE
Problem: Patient Care Overview  Goal: Plan of Care Review  Outcome: Ongoing (interventions implemented as appropriate)  Nutrition Goals: adequate po intake >=75% by discharge  Nutrition Goal Status: progressing towards goal  Communication of RD Recs:  (poc reviewed)    Nutrition Discharge Planning: Low Sodium diet with adequate intake to meet EEN & EPN

## 2018-05-25 NOTE — PT/OT/SLP PROGRESS
"Physical Therapy Treatment    Patient Name:  Merari Romero   MRN:  [id number]    Recommendations:     Discharge Recommendations:  home with home health, home health PT   Discharge Equipment Recommendations: bedside commode, bath bench, walker, flora, wheelchair   Barriers to discharge: None    Assessment:     Merari Romero is a 78 y.o. female admitted with a medical diagnosis of <principal problem not specified>.  She presents with the following impairments/functional limitations:  weakness, impaired endurance, impaired self care skills, impaired functional mobilty, gait instability, impaired balance, decreased upper extremity function, decreased lower extremity function, decreased safety awareness . Pt progressing well with POC goals. Pt with continued LLE pain limiting mobility progression.    Rehab Prognosis:  Fair; patient would benefit from acute skilled PT services to address these deficits and reach maximum level of function.      Recent Surgery: * No surgery found *      Plan:     During this hospitalization, patient to be seen  (1-2x/day Monday-Friday; PRN Weekends/Holidays) to address the above listed problems via gait training, therapeutic activities, therapeutic exercises  · Plan of Care Expires:   (upon D/C from facility)   Plan of Care Reviewed with: patient, family    Subjective     Communicated with patient prior to session.  Patient found supine in bed upon PT entry to room, agreeable to treatment.      Chief Complaint: "My leg hurts"  Patient comments/goals: "Go home"  Pain/Comfort:  · Pain Rating 1: 8/10  · Location - Side 1: Left  · Location - Orientation 1: generalized  · Location 1: leg  · Pain Addressed 1: Nurse notified    Patients cultural, spiritual, Shinto conflicts given the current situation:      Objective:     Patient found with:       General Precautions: Standard, fall   Orthopedic Precautions:LUE non weight bearing, LLE weight bearing as tolerated   Braces: N/A     Functional " Mobility:  · Bed Mobility:     · Rolling Left:  stand by assistance  · Rolling Right: stand by assistance  · Scooting: stand by assistance  · Bridging: stand by assistance  · Supine to Sit: minimum assistance  · Sit to Supine: minimum assistance  · Transfers:     · Sit to Stand:  contact guard assistance with hemiwalker  · Bed to Chair: contact guard assistance with  hemiwalker  using  Step Transfer  · Gait: Pt unable to ambulate      AM-PAC 6 CLICK MOBILITY  Turning over in bed (including adjusting bedclothes, sheets and blankets)?: 3  Sitting down on and standing up from a chair with arms (e.g., wheelchair, bedside commode, etc.): 3  Moving from lying on back to sitting on the side of the bed?: 3  Moving to and from a bed to a chair (including a wheelchair)?: 3  Need to walk in hospital room?: 1  Climbing 3-5 steps with a railing?: 1  Total Score: 14     Patient left up in chair with all lines intact, call button in reach, NSG notified and family\ present..    GOALS:    Physical Therapy Goals        Problem: Physical Therapy Goal    Goal Priority Disciplines Outcome Goal Variances Interventions   Physical Therapy Goal     PT/OT, PT      Description:  Goals to be met by: 2018     Patient will increase functional independence with mobility by performin. Supine to sit with Stand-by Assistance  2. Sit to supine with Stand-by Assistance  3. Rolling to Left and Right with Stand-by Assistance.  4. Sit to stand transfer with Stand-by Assistance, using bronson-walker  5. Bed to chair transfer with Stand-by Assistance using Bronson-walker  6. Gait  x 50 feet with Stand-by Assistance using Bronson-walker.              Problem: Physical Therapy Goal    Goal Priority Disciplines Outcome Goal Variances Interventions   Physical Therapy Goal     PT/OT, PT      Description:  Ongoing efforts  to achieved previously documented goals           Problem: Physical Therapy Goal    Goal Priority Disciplines Outcome Goal Variances  Interventions   Physical Therapy Goal     PT/OT, PT Ongoing (interventions implemented as appropriate)     Description:  1. Pt will demonstrate hemiwalker use for 25 ft - WBAT Left lower extremiity with minimal assistance for safety                    Time Tracking:     PT Received On: 05/25/18  PT Start Time: 1323     PT Stop Time: 1346  PT Total Time (min): 23 min     Billable Minutes: Therapeutic Activity 23 minutes    Treatment Type: Treatment  PT/PTA: PT           Paige Ramirez, PT  05/25/2018

## 2018-05-25 NOTE — ASSESSMENT & PLAN NOTE
Ortho consulted via ER, non surgical orthopedic injuries per Dr Doc Wolf, non wt bearing injury to left upper ext Splinted   She also can wt bear as tolerated to left lower ext   Bo Urbina NP with ortho came 5/16, no changes, f/u Aminta 3 weeks for repeat films  Undressed splint 5/21 to assess skin tears, healing well, no s/s infection. Will undress biweekly and apply bactroban  5/25/18 Stable- doing well

## 2018-05-25 NOTE — PT/OT/SLP PROGRESS
Occupational Therapy   Treatment    Name: Merari Romero  MRN: [id number]  Admitting Diagnosis:  <principal problem not specified>       Recommendations:     Discharge Recommendations: rehabilitation facility, outpatient PT  Discharge Equipment Recommendations:  bedside commode, bath bench, walker, flora  Barriers to discharge:  None    Subjective     Communicated with: nsg prior to session.  Pain/Comfort:  · Pain Rating 1:  (not quantified)  · Location - Side 1: Left  · Location - Orientation 1: lower  · Location 1: leg  · Pain Addressed 1: Pre-medicate for activity    Patients cultural, spiritual, Confucianist conflicts given the current situation: none voiced    Objective:     Patient found :  supine in bed with HOB elevated    General Precautions: Standard, fall   Orthopedic Precautions:LUE non weight bearing, LLE weight bearing as tolerated   Braces:  (soft cast LUE)     Occupational Performance:    Bed Mobility:    · Patient completed Scooting/Bridging with modified independence  · Patient completed Supine to Sit with minimum assistance     Functional Mobility/Transfers:  · Patient completed Sit <> Stand Transfer with contact guard assistance  with  hemiwalker   · Patient completed Bed <> Chair Transfer using Stand Pivot and after ambulation technique with minimum assistance with hemiwalker  · Functional Mobility: Pt ambulated a total of 14' Pt ambulated 4' towards the restroom before after to return to bed to sit due to pain.  After recovery, Pt ambulated 10' to bedside chair with hemiwalker and Min (A).    Activities of Daily Living:  · Grooming: contact guard assistance Hair grooming in standing for 30 sec with good balance    Patient left up in chair with nursing notified and  and daughter present        Education:    Assessment:     Merari Romero is a 78 y.o. female with a medical diagnosis of <principal problem not specified>.  Pt c/o increase pain in (L) LE that limited distance of functional mobility, Pt  tolerated standing well with grooming, but can benefit from increase standing tolerance. Performance deficits affecting function are impaired endurance, impaired self care skills, impaired functional mobilty.      Rehab Prognosis:  fair; patient would benefit from acute skilled OT services to address these deficits and reach maximum level of function.       Plan:     Patient to be seen 3 x/week, 5 x/week to address the above listed problems via self-care/home management, therapeutic activities, therapeutic exercises  · Plan of Care Expires:  (d/c from facility)  · Plan of Care Reviewed with: patient, family    This Plan of care has been discussed with the patient who was involved in its development and understands and is in agreement with the identified goals and treatment plan    GOALS:    Occupational Therapy Goals        Problem: Occupational Therapy Goal    Goal Priority Disciplines Outcome Interventions   Occupational Therapy Goal     OT, PT/OT Ongoing (interventions implemented as appropriate)    Description:  Goals to be met by: 05/24/2018     Patient will increase functional independence with ADLs by performing:    UE Dressing to be assessed (goal met 5/18/2018)  LE Dressing with Minimal Assistance. (goal partially met with socks)  Toilet transfer to bedside commode with Contact Guard Assistance.    Updated goals 5/21/2018: Will continue to work on goals 2 and 3 above and add  UE dressing with SBA                       Time Tracking:     OT Date of Treatment: 05/25/18  OT Start Time: 0836  OT Stop Time: 0855  OT Total Time (min): 19 min    Billable Minutes:Therapeutic Activity 19 min    Ani Dueñas OT  5/25/2018

## 2018-05-25 NOTE — PROGRESS NOTES
[facility] Medicine  Progress Note    Patient Name: Merari Romero  MRN: [id number]  Patient Class: IP- Swing   Admission Date: 5/17/2018  Length of Stay: 8 days  Attending Physician: Michael Chacon MD  Primary Care Provider: Shaun Barraza MD        Subjective:     Principal Problem:<principal problem not specified>    HPI:  5/18/18 SWING ADMIT  78 year old female admitted 5/11/18 for Closed nondisplaced fracture of neck of left radius after a falll going up stairs, slipped on first step.. She was admitted for pain control and PT. She is progressing with PT but slow. Did not qualify for inpatient rehab as can not tolerate 3hr of activity.    SWING placement yesterday for continued PT/OT . She was living at home with  prior to fall and able to take care of self and him.  Patient had a restful night. VS stable. Some  C/o left sided pelvic pain, relieved with hydrocodone x2 and morphine x1 for breakthrough pain. Ace wrap and sling to left arm intact. She reports she tolerated PT well yesterday.  Encouraged continue IS.    and daughter at bedside, supportive. Pt. amb. 10' with hemiwalker, mod. A. No c/o SOB or fevers this morning.    Hospital Course:  5/21/18  She is doing well. Skin tears healing well. She is ambulating 20 ft with PT using rolling walker. Her goal is 50 ft. She has only used norco PO over last 24hr to control her pain. She remains on RA with POX 93-96%. Has IS at bedside. Using PRN. No fever. On lovonox SQ for DVT prophylaxis. Daughter at bedside reports that they would like to take her home rather than send her to INPT rehab if she is strong enough by D/c    5/23/18  Yesterday did well with therapy. Using stand by assist she was able to do all bed mobility, and used contact guard assist to transfer from sit to stand and ambulated with the same assist using flora walker 15 ft. This is better than the weekend where she was still requiring min assist per PT.  She is progressing towards her goal of stand by assist 50ft using flora walker.     She has not required morphine for breakthrough pain since the 5/21/18. Using norco PRN for pain.     VSS/afebrile, maintaining sats on RA, IS at bedside and nebs BID. lovenox daily     C/o left foot pain today. She reports that the the top of foot to her knee. Has hx of arthritis. Just started yesterday Pain meds help the pain in foot as well as hip  5/24/18 Brief MD; pt with c/o persistent Left LE pain; tender and hurts with movement; also notes left hip pain with rotation. She cannot dorsiflex without pain and it is limiting her ability to ambulate/participate in PT  5/25/18 Pt still with c/o left LE pain; US negative for DVT; symptoms sound consistent with plantar/ dorsal fascitis. Pain under heel and to top of foot; worse when she just starts to walk after getting out of bed  One toe with slight redness.   She does have pain to left hip with rotation but has + fractures involving the left symphysis pubis and the inferior ramus.     Interval History: see      Review of Systems   Constitutional: Negative for chills and fever.   Respiratory: Negative for shortness of breath.    Cardiovascular: Negative for chest pain and palpitations.   Gastrointestinal: Negative for abdominal pain, blood in stool, constipation and nausea.   Genitourinary: Negative for difficulty urinating and pelvic pain.   Musculoskeletal: Positive for arthralgias (left ankle) and myalgias.        Left hip pain with moving as well as left arm pain with moving   Skin:        Left middle toe bruised   Psychiatric/Behavioral: Negative for dysphoric mood, sleep disturbance and suicidal ideas. The patient is not nervous/anxious.      Objective:     Vital Signs (Most Recent):  Temp: 96.1 °F (35.6 °C) (05/25/18 0745)  Pulse: 79 (05/25/18 0745)  Resp: 18 (05/25/18 0745)  BP: 139/64 (05/25/18 0745)  SpO2: 95 % (05/25/18 0745) Vital Signs (24h Range):  Temp:  [96.1 °F  (35.6 °C)-97.2 °F (36.2 °C)] 96.1 °F (35.6 °C)  Pulse:  [70-80] 79  Resp:  [16-18] 18  SpO2:  [93 %-98 %] 95 %  BP: (123-148)/(59-80) 139/64     Weight: 64 kg (141 lb 1.5 oz) (No New\)  Body mass index is 24.99 kg/m².    Intake/Output Summary (Last 24 hours) at 05/25/18 1031  Last data filed at 05/25/18 0448   Gross per 24 hour   Intake              480 ml   Output              700 ml   Net             -220 ml      Physical Exam   Constitutional: She is oriented to person, place, and time. She appears well-developed. No distress.   HENT:   Head: Normocephalic and atraumatic.   Eyes: Conjunctivae are normal. Pupils are equal, round, and reactive to light.   Neck: Normal range of motion. Neck supple. No thyromegaly present.   Cardiovascular: Normal rate, regular rhythm, normal heart sounds and intact distal pulses.    Pulmonary/Chest: Effort normal and breath sounds normal. No respiratory distress. She has no wheezes.   Abdominal: Soft. Bowel sounds are normal. There is no tenderness.   Musculoskeletal: She exhibits tenderness (hip bilaterally. + left with rotation). She exhibits no edema.   Left sling in place    Left foot with MTP arthritic changes     +Homans   Lymphadenopathy:     She has no cervical adenopathy.   Neurological: She is alert and oriented to person, place, and time.   Skin: Skin is warm and dry. No rash noted.   Psychiatric: She has a normal mood and affect. Her behavior is normal.   Nursing note and vitals reviewed.      Significant Labs:   Lab Results   Component Value Date    WBC 4.81 05/24/2018    HGB 12.0 05/24/2018    HCT 36.2 (L) 05/24/2018    MCV 89 05/24/2018     (H) 05/24/2018       Significant Imaging:     Left hip x ray No definite evidence of a displaced fracture or dislocation.    The symphysis pubis is limited to evaluation due to angulation and positioning and a pubis fracture cannot be excluded..    CXR 2.3 cm confluence of shadows overlying the left upper lobe.  Consider  nonurgent follow-up chest with lateral for further evaluation  .  REPEAT CXR   Chronic lung changes are seen bilaterally.  Focal region of scarring is seen in the left suprahilar location, less prominent as compared to the previous study of 05/11/2018.  No consolidation or pleural effusions.  The heart is normal in size.  Calcified atheromatous disease affects the aorta.  The bones are osteopenic and show age-appropriate degenerative change.    Left elbow x ray There is a fracture involving the radial neck.  Posterior fat pad is present compatible with an effusion.    Left hip x rays repeat  There are fractures involving the left symphysis pubis and the inferior ramus which were limited to evaluation on the prior study.    No evidence of a fracture of the left hip.  No dislocation.  Vascular calcifications overlying the left femoral head and neck.        CRANIAL NERVES     CN III, IV, VI   Pupils are equal, round, and reactive to light.        Assessment/Plan:      Pain in left lower leg      US of Left LE - rule out DVT (though unlikely, she had a +homans and calf tenderness).  If neg, go ahead with dose of nsaid x 1 and robaxin.        Left hip pain    Treat pain - known fracture. Can bear weight.  With worsening pain, may consider repeat imaging if she can't bear weight on hip, though.          Inadequate dietary energy intake              Debility    Continue PT/OT  IS encouraged.         Fall    Fear of falling. Cont with PT/OT          Urinary hesitancy    U/ a and culture- all normal  Better now that she can get up to BEDS SIDE commode          History of deep venous thrombosis    lovenox 40 daily due to hx of DVT and immobility,    Recheck left leg US  today to r/o clot - this was normal.        Closed fracture of left pubis    Non operative   Pain control try and cont po as much as poss  Try and reduce risk of associated complications of limited mobility(IS, nebs, PT)  Discussed with Bo GONZALEZ ortho. Seen  patient 5/16, cont non wt bearing to left upper ext with long arm splint and wt bearing as tolerated to left lower ext. F/u Dr Lemos 3 weeks from fall  for repeat films  Cont PT till safe for D/C to home        Closed nondisplaced fracture of neck of left radius    Ortho consulted via ER, non surgical orthopedic injuries per Dr Doc Wolf, non wt bearing injury to left upper ext Splinted   She also can wt bear as tolerated to left lower ext   Bo Urbina NP with ortho came 5/16, no changes, f/u Aminta 3 weeks for repeat films  Undressed splint 5/21 to assess skin tears, healing well, no s/s infection. Will undress biweekly and apply bactroban  5/25/18 Stable- doing well        Hyperlipidemia LDL goal <70    Cont lipitor        OA (osteoarthritis)    Give small dose toradol today - this didn't help.  Try advil.          Neuropathy of lower extremity    Cont cymbalta and amitriptyline may consider lyrica at home          HTN (hypertension)    /59  Well controlled on amlodipine and toprol          VTE Risk Mitigation         Ordered     enoxaparin injection 40 mg  Daily      05/17/18 1159     IP VTE HIGH RISK PATIENT  Once      05/17/18 1158     Place sequential compression device  Until discontinued      05/17/18 1158              Lance Tabor MD  Department of Hospital Medicine   [facility]

## 2018-05-25 NOTE — PT/OT/SLP PROGRESS
"Physical Therapy Treatment    Patient Name:  Merari Romero   MRN:  [id number]    Recommendations:     Discharge Recommendations:  home with home health, home health PT   Discharge Equipment Recommendations: bedside commode, bath bench, walker, flora, wheelchair   Barriers to discharge: None    Assessment:     Merari Romero is a 78 y.o. female admitted with a medical diagnosis of <principal problem not specified>.  She presents with the following impairments/functional limitations:  weakness, impaired endurance, impaired self care skills, impaired functional mobilty, gait instability, impaired balance, decreased upper extremity function, decreased lower extremity function, decreased safety awareness, pain . Pt with increased c/o LLE pain from groin to her foot. Pt with difficulty WB on LLE at all. Pt unable to ambulate today.    Rehab Prognosis:  FAir; patient would benefit from acute skilled PT services to address these deficits and reach maximum level of function.      Recent Surgery: * No surgery found *      Plan:     During this hospitalization, patient to be seen  (1-2x/day Monday-Friday; PRN Weekends/Holidays) to address the above listed problems via gait training, therapeutic activities, therapeutic exercises  · Plan of Care Expires:   (upon D/C from facility)   Plan of Care Reviewed with: patient, family    Subjective     Communicated with patient and her daughter and spouse prior to session.  Patient found supine in bed upon PT entry to room, agreeable to treatment.      Chief Complaint: "My leg hurts so bad"  Patient comments/goals: "Go home"  Pain/Comfort:  · Pain Rating 1: 8/10  · Location - Side 1: Left  · Location - Orientation 1: generalized  · Location 1: leg  · Pain Addressed 1: Nurse notified    Patients cultural, spiritual, Moravian conflicts given the current situation:      Objective:     Patient found with:       General Precautions: Standard, fall   Orthopedic Precautions:LUE non weight bearing, " LLE weight bearing as tolerated   Braces: N/A     Functional Mobility:  · Bed Mobility:     · Rolling Left:  stand by assistance  · Rolling Right: stand by assistance  · Scooting: stand by assistance  · Supine to Sit: minimum assistance  · Sit to Supine: minimum assistance  · Transfers:     · Sit to Stand:  minimum assistance with rolling walker  · Bed to Chair: minimum assistance with  rolling walker  using  Step Transfer  · Gait: Pt unable to ambulate today      AM-PAC 6 CLICK MOBILITY  Turning over in bed (including adjusting bedclothes, sheets and blankets)?: 3  Sitting down on and standing up from a chair with arms (e.g., wheelchair, bedside commode, etc.): 3  Moving from lying on back to sitting on the side of the bed?: 3  Moving to and from a bed to a chair (including a wheelchair)?: 3  Need to walk in hospital room?: 2  Climbing 3-5 steps with a railing?: 1  Total Score: 15       Therapeutic Activities and Exercises:   Therapeutic exercises seated on BLE excluding left knee exercises at all joints in available planes of motion with rest breaks as needed to increase strength and endurance with all gross mobility skills.    Patient left up in chair with all lines intact, call button in reach, NSG notified and family present..    GOALS:    Physical Therapy Goals        Problem: Physical Therapy Goal    Goal Priority Disciplines Outcome Goal Variances Interventions   Physical Therapy Goal     PT/OT, PT      Description:  Goals to be met by: 2018     Patient will increase functional independence with mobility by performin. Supine to sit with Stand-by Assistance  2. Sit to supine with Stand-by Assistance  3. Rolling to Left and Right with Stand-by Assistance.  4. Sit to stand transfer with Stand-by Assistance, using bronson-walker  5. Bed to chair transfer with Stand-by Assistance using Bronson-walker  6. Gait  x 50 feet with Stand-by Assistance using Bronson-walker.              Problem: Physical Therapy Goal     Goal Priority Disciplines Outcome Goal Variances Interventions   Physical Therapy Goal     PT/OT, PT      Description:  Ongoing efforts  to achieved previously documented goals           Problem: Physical Therapy Goal    Goal Priority Disciplines Outcome Goal Variances Interventions   Physical Therapy Goal     PT/OT, PT Ongoing (interventions implemented as appropriate)     Description:  1. Pt will demonstrate hemiwalker use for 25 ft - WBAT Left lower extremiity with minimal assistance for safety                    Time Tracking:     PT Received On: 05/25/18  PT Start Time: 0937     PT Stop Time: 1000  PT Total Time (min): 23 min     Billable Minutes: Therapeutic Activity 13 minutes and Therapeutic Exercise 10 minutes    Treatment Type: Treatment  PT/PTA: PT           Paige Ramirez, PT  05/25/2018

## 2018-05-25 NOTE — PROGRESS NOTES
" [facility]  Adult Nutrition  Progress Note    SUMMARY       Recommendations    Recommendation/Intervention: Continue Low Sodium diet as tolerated encouraging good intkae, Offer Boost Plus as needed  Goals: adequate po intake >=75% by discharge  Nutrition Goal Status: progressing towards goal  Communication of RD Recs:  (poc reviewed)    Nutrition Discharge Planning: Low Sodium diet with adequate intake to meet EEN & EPN    Reason for Assessment    Reason for Assessment: RD follow-up  Diagnosis:  (fracture of left radius)  Relevant Medical History: HTN, DVT, Osteoporosis  Interdisciplinary Rounds: did not attend  General Information Comments: Pt intakecontnues to increase slowly; skin tears healing; reporting some pain      Nutrition Risk Screen    Nutrition Risk Screen: no indicators present    Nutrition/Diet History    Patient Reported Diet/Restrictions/Preferences: low salt  Do you have any cultural, spiritual, Sabianism conflicts, given your current situation?: none voiced  Food Allergies: NKFA  Factors Affecting Nutritional Intake: pain, nausea/vomiting    Anthropometrics    Temp: 97.8 °F (36.6 °C)  Height Method: Stated  Height: 5' 3" (160 cm)  Height (inches): 63 in  Weight Method: Bed Scale  Weight: 64 kg (141 lb 1.5 oz) (no new)  Weight (lb): 141.1 lb  Ideal Body Weight (IBW), Female: 115 lb  % Ideal Body Weight, Female (lb): 122.7 lb  BMI (Calculated): 25  BMI Grade: 25 - 29.9 - overweight       Lab/Procedures/Meds    Pertinent Labs Reviewed: reviewed  Pertinent Medications Reviewed: reviewed    Physical Findings/Assessment    Overall Physical Appearance: advanced age, weak  Tubes:  (-)  Oral/Mouth Cavity: no grinding or difficulty chewing    Estimated/Assessed Needs    Weight Used For Calorie Calculations: 64 kg (141 lb 1.5 oz)  Energy Calorie Requirements (kcal): 1349  Energy Need Method: [place] (x1.25)  Protein Requirements: 64-76 (1.0-1.2)  Weight Used For Protein " Calculations: 64 kg (141 lb 1.5 oz)  Fluid Requirements (mL): 1ml/kcal or per MD     RDA Method (mL): 1349         Nutrition Prescription Ordered    Current Diet Order: Low Sodium  Nutrition Order Comments: 50-75% intake  Oral Nutrition Supplement: Boost as needed    Evaluation of Received Nutrient/Fluid Intake    Energy Calories Required: meeting needs  Protein Required: meeting needs  Fluid Required: meeting needs  Tolerance: tolerating  % Intake of Estimated Energy Needs: 50 - 75 %  % Meal Intake: 50 - 75 %    Nutrition Risk    Level of Risk/Frequency of Follow-up:  (F/U 1x/wk)     Assessment and Plan    Inadequate dietary energy intake    Related to (etiology):   Decreased appetite    Signs and Symptoms (as evidenced by):   ~50% intake at this time    Interventions/Recommendations (treatment strategy):  Continue Low Sodium diet  Boost Plus as needed  RD to monitor    Nutrition Diagnosis Status:   Improving               Monitor and Evaluation    Food and Nutrient Intake: food and beverage intake  Food and Nutrient Adminstration: diet order  Knowledge/Beliefs/Attitudes: food and nutrition knowledge/skill, beliefs and attitudes  Physical Activity and Function: nutrition-related ADLs and IADLs  Anthropometric Measurements: weight, weight change  Biochemical Data, Medical Tests and Procedures: electrolyte and renal panel  Nutrition-Focused Physical Findings: overall appearance     Nutrition Follow-Up    RD Follow-up?: Yes

## 2018-05-25 NOTE — PLAN OF CARE
Problem: Patient Care Overview  Goal: Plan of Care Review  Outcome: Ongoing (interventions implemented as appropriate)  Pt doing well. No question/concerns at this time. Agrees with poc. No /falls.  Pain relived with po meds. Working well with pt and ot. Should be able to go Home Monday.

## 2018-05-25 NOTE — ASSESSMENT & PLAN NOTE
Non operative   Pain control try and cont po as much as poss  Try and reduce risk of associated complications of limited mobility(IS, nebs, PT)  Discussed with [facility] GONZALEZ ortho. Seen patient 5/16, cont non wt bearing to left upper ext with long arm splint and wt bearing as tolerated to left lower ext. F/u Dr Lemos 3 weeks from fall  for repeat films  Cont PT till safe for D/C to home

## 2018-05-25 NOTE — PLAN OF CARE
05/25/18 1222   Discharge Reassessment   Assessment Type Discharge Planning Reassessment       Order received for a flora walker. Faxed order and documentation to [facility].    Andrei Sandra LMSW

## 2018-05-25 NOTE — ASSESSMENT & PLAN NOTE
Related to (etiology):   Decreased appetite    Signs and Symptoms (as evidenced by):   ~50% intake at this time    Interventions/Recommendations (treatment strategy):  Continue Low Sodium diet  Boost Plus as needed  RD to monitor    Nutrition Diagnosis Status:   Improving

## 2018-05-25 NOTE — SUBJECTIVE & OBJECTIVE
Interval History: see      Review of Systems   Constitutional: Negative for chills and fever.   Respiratory: Negative for shortness of breath.    Cardiovascular: Negative for chest pain and palpitations.   Gastrointestinal: Negative for abdominal pain, blood in stool, constipation and nausea.   Genitourinary: Negative for difficulty urinating and pelvic pain.   Musculoskeletal: Positive for arthralgias (left ankle) and myalgias.        Left hip pain with moving as well as left arm pain with moving   Skin:        Left middle toe bruised   Psychiatric/Behavioral: Negative for dysphoric mood, sleep disturbance and suicidal ideas. The patient is not nervous/anxious.      Objective:     Vital Signs (Most Recent):  Temp: 96.1 °F (35.6 °C) (05/25/18 0745)  Pulse: 79 (05/25/18 0745)  Resp: 18 (05/25/18 0745)  BP: 139/64 (05/25/18 0745)  SpO2: 95 % (05/25/18 0745) Vital Signs (24h Range):  Temp:  [96.1 °F (35.6 °C)-97.2 °F (36.2 °C)] 96.1 °F (35.6 °C)  Pulse:  [70-80] 79  Resp:  [16-18] 18  SpO2:  [93 %-98 %] 95 %  BP: (123-148)/(59-80) 139/64     Weight: 64 kg (141 lb 1.5 oz) (No New\)  Body mass index is 24.99 kg/m².    Intake/Output Summary (Last 24 hours) at 05/25/18 1031  Last data filed at 05/25/18 0448   Gross per 24 hour   Intake              480 ml   Output              700 ml   Net             -220 ml      Physical Exam   Constitutional: She is oriented to person, place, and time. She appears well-developed. No distress.   HENT:   Head: Normocephalic and atraumatic.   Eyes: Conjunctivae are normal. Pupils are equal, round, and reactive to light.   Neck: Normal range of motion. Neck supple. No thyromegaly present.   Cardiovascular: Normal rate, regular rhythm, normal heart sounds and intact distal pulses.    Pulmonary/Chest: Effort normal and breath sounds normal. No respiratory distress. She has no wheezes.   Abdominal: Soft. Bowel sounds are normal. There is no tenderness.   Musculoskeletal: She exhibits  tenderness (hip bilaterally. + left with rotation). She exhibits no edema.   Left sling in place    Left foot with MTP arthritic changes     +Homans   Lymphadenopathy:     She has no cervical adenopathy.   Neurological: She is alert and oriented to person, place, and time.   Skin: Skin is warm and dry. No rash noted.   Psychiatric: She has a normal mood and affect. Her behavior is normal.   Nursing note and vitals reviewed.      Significant Labs:   Lab Results   Component Value Date    WBC 4.81 05/24/2018    HGB 12.0 05/24/2018    HCT 36.2 (L) 05/24/2018    MCV 89 05/24/2018     (H) 05/24/2018       Significant Imaging:     Left hip x ray No definite evidence of a displaced fracture or dislocation.    The symphysis pubis is limited to evaluation due to angulation and positioning and a pubis fracture cannot be excluded..    CXR 2.3 cm confluence of shadows overlying the left upper lobe.  Consider nonurgent follow-up chest with lateral for further evaluation  .  REPEAT CXR   Chronic lung changes are seen bilaterally.  Focal region of scarring is seen in the left suprahilar location, less prominent as compared to the previous study of 05/11/2018.  No consolidation or pleural effusions.  The heart is normal in size.  Calcified atheromatous disease affects the aorta.  The bones are osteopenic and show age-appropriate degenerative change.    Left elbow x ray There is a fracture involving the radial neck.  Posterior fat pad is present compatible with an effusion.    Left hip x rays repeat  There are fractures involving the left symphysis pubis and the inferior ramus which were limited to evaluation on the prior study.    No evidence of a fracture of the left hip.  No dislocation.  Vascular calcifications overlying the left femoral head and neck.        CRANIAL NERVES     CN III, IV, VI   Pupils are equal, round, and reactive to light.

## 2018-05-25 NOTE — ASSESSMENT & PLAN NOTE
lovenox 40 daily due to hx of DVT and immobility,    Recheck left leg US  today to r/o clot - this was normal.

## 2018-05-26 PROCEDURE — 11000004 HC SNF PRIVATE

## 2018-05-26 PROCEDURE — 25000003 PHARM REV CODE 250: Performed by: FAMILY MEDICINE

## 2018-05-26 PROCEDURE — 25000242 PHARM REV CODE 250 ALT 637 W/ HCPCS: Performed by: NURSE PRACTITIONER

## 2018-05-26 PROCEDURE — 25000003 PHARM REV CODE 250: Performed by: NURSE PRACTITIONER

## 2018-05-26 PROCEDURE — 63600175 PHARM REV CODE 636 W HCPCS: Performed by: NURSE PRACTITIONER

## 2018-05-26 PROCEDURE — 94640 AIRWAY INHALATION TREATMENT: CPT

## 2018-05-26 PROCEDURE — 94761 N-INVAS EAR/PLS OXIMETRY MLT: CPT

## 2018-05-26 PROCEDURE — A4216 STERILE WATER/SALINE, 10 ML: HCPCS | Performed by: FAMILY MEDICINE

## 2018-05-26 PROCEDURE — 97110 THERAPEUTIC EXERCISES: CPT

## 2018-05-26 PROCEDURE — 97530 THERAPEUTIC ACTIVITIES: CPT

## 2018-05-26 RX ORDER — DICLOFENAC SODIUM 10 MG/G
GEL TOPICAL 2 TIMES DAILY
Status: DISCONTINUED | OUTPATIENT
Start: 2018-05-26 | End: 2018-05-27

## 2018-05-26 RX ORDER — DICLOFENAC SODIUM 10 MG/G
GEL TOPICAL 3 TIMES DAILY
Status: DISCONTINUED | OUTPATIENT
Start: 2018-05-26 | End: 2018-05-26

## 2018-05-26 RX ORDER — ZOLPIDEM TARTRATE 5 MG/1
5 TABLET ORAL NIGHTLY PRN
Status: DISCONTINUED | OUTPATIENT
Start: 2018-05-26 | End: 2018-05-29 | Stop reason: HOSPADM

## 2018-05-26 RX ORDER — SUCRALFATE 1 G/10ML
1.5 SUSPENSION ORAL ONCE
Status: COMPLETED | OUTPATIENT
Start: 2018-05-26 | End: 2018-05-26

## 2018-05-26 RX ADMIN — PANTOPRAZOLE SODIUM 40 MG: 40 TABLET, DELAYED RELEASE ORAL at 08:05

## 2018-05-26 RX ADMIN — ENOXAPARIN SODIUM 40 MG: 100 INJECTION SUBCUTANEOUS at 04:05

## 2018-05-26 RX ADMIN — DULOXETINE 30 MG: 30 CAPSULE, DELAYED RELEASE ORAL at 08:05

## 2018-05-26 RX ADMIN — HYDROCODONE BITARTRATE AND ACETAMINOPHEN 1 TABLET: 5; 325 TABLET ORAL at 08:05

## 2018-05-26 RX ADMIN — ZOLPIDEM TARTRATE 5 MG: 5 TABLET, FILM COATED ORAL at 11:05

## 2018-05-26 RX ADMIN — HYDROCODONE BITARTRATE AND ACETAMINOPHEN 1 TABLET: 5; 325 TABLET ORAL at 03:05

## 2018-05-26 RX ADMIN — HYDROCODONE BITARTRATE AND ACETAMINOPHEN 1 TABLET: 5; 325 TABLET ORAL at 12:05

## 2018-05-26 RX ADMIN — SUCRALFATE 1.5 G: 1 SUSPENSION ORAL at 09:05

## 2018-05-26 RX ADMIN — AMITRIPTYLINE HYDROCHLORIDE 25 MG: 25 TABLET, FILM COATED ORAL at 09:05

## 2018-05-26 RX ADMIN — AMLODIPINE BESYLATE 5 MG: 5 TABLET ORAL at 08:05

## 2018-05-26 RX ADMIN — DICLOFENAC: 10 GEL TOPICAL at 11:05

## 2018-05-26 RX ADMIN — MUPIROCIN: 20 OINTMENT TOPICAL at 11:05

## 2018-05-26 RX ADMIN — ATORVASTATIN CALCIUM 20 MG: 20 TABLET, FILM COATED ORAL at 08:05

## 2018-05-26 RX ADMIN — IPRATROPIUM BROMIDE AND ALBUTEROL SULFATE 3 ML: 2.5; .5 SOLUTION RESPIRATORY (INHALATION) at 07:05

## 2018-05-26 RX ADMIN — DOCUSATE SODIUM 100 MG: 100 CAPSULE, LIQUID FILLED ORAL at 08:05

## 2018-05-26 RX ADMIN — HYDROCODONE BITARTRATE AND ACETAMINOPHEN 1 TABLET: 5; 325 TABLET ORAL at 04:05

## 2018-05-26 RX ADMIN — SODIUM CHLORIDE, PRESERVATIVE FREE 10 ML: 5 INJECTION INTRAVENOUS at 09:05

## 2018-05-26 RX ADMIN — METOPROLOL SUCCINATE 50 MG: 50 TABLET, EXTENDED RELEASE ORAL at 08:05

## 2018-05-26 NOTE — PROGRESS NOTES
Staff Handoff    Bedside report received from CECILIA Ang. VS stable, afebrile. C/o left leg pain rated 7/10. Pain medication given per RN. Assessment complete per flowsheet. Daughter at bedside. Bed locked and in lowest position. Call bell in reach.   Resident Handoff

## 2018-05-26 NOTE — PROGRESS NOTES
Bedside report recieved from Lisa BROOKS.  Assessment completed per flow sheet. Bed locked, lower and call bell in reach.  Educated to call if needed.

## 2018-05-26 NOTE — PLAN OF CARE
Problem: Patient Care Overview  Goal: Plan of Care Review  Outcome: Ongoing (interventions implemented as appropriate)  Patient aware of plan of care. VS stable. C/o left lower leg pain mildly relieved with hydrocodone tablet every 4 hours. Up to BSC with assistance. Patient had a difficult time getting from bed to BSC due to pain from lower leg. Soft cast to LUE, elevated with pillows. PT. Free from falls/injuries. No questions or concerns at this time. Agrees with plan of care.

## 2018-05-26 NOTE — PLAN OF CARE
Problem: Patient Care Overview  Goal: Plan of Care Review  Outcome: Ongoing (interventions implemented as appropriate)  Pt doing well. No question/concerns at this time. Agrees with poc. No /falls.  Pain with any movement not relieved with meds. Diclofenac gel ordered to help with pain.  Whenever she is not moving her pain is gone. Pain meds are given every 4 hours.  Patient is getting up to bedside commode with max assistance. She will likely have to have her left knee fixed to recover. She will most likely not be going home Monday d/t her condition.

## 2018-05-26 NOTE — PT/OT/SLP PROGRESS
Physical Therapy Treatment    Patient Name:  Merari Romero   MRN:  [id number]    Recommendations:     Discharge Recommendations:      Discharge Equipment Recommendations: walker, flora   Barriers to discharge: None    Assessment:     Merari Romero is a 78 y.o. female admitted with a medical diagnosis of <principal problem not specified>.  She presents with the following impairments/functional limitations:  impaired self care skills, impaired functional mobilty, impaired balance, gait instability, decreased upper extremity function, decreased lower extremity function .    Rehab Prognosis:  Good ; patient would benefit from acute skilled PT services to address these deficits and reach maximum level of function.      Recent Surgery: * No surgery found *      Plan:     During this hospitalization, patient to be seen  (1-2x/day(M-F); PRN(Sat.,Sun.)) to address the above listed problems via gait training, therapeutic activities, therapeutic exercises  · Plan of Care Expires:   (Upon d/c from facility)   Plan of Care Reviewed with: patient, spouse, daughter    Subjective     Communicated with patient and family prior to session.  Patient found head of bed elevated upon PT entry to room, agreeable to treatment.      Chief Complaint: severe left leg pain below knee to foot  Patient comments/goals: to eliminate the pain  in left leg  Pain/Comfort:  · Pain Rating 1: 8/10  · Location - Side 1: Left  · Location - Orientation 1: generalized  · Location 1: leg  · Pain Addressed 1: Nurse notified  · Pain Rating Post-Intervention 1: 7/10    Patients cultural, spiritual, Jainism conflicts given the current situation: none verbalized    Objective:     Patient found with:       General Precautions: Standard, fall   Orthopedic Precautions:LUE non weight bearing, LLE weight bearing as tolerated   Braces:       Functional Mobility:  · Transfers:     · Sit to Stand:  moderate assistance with flora walker and / or PT assssitance  · Bed to Chair:  moderate assistance and touch down wt onto left lower leg with  hemiwalker and PT assistance  using  Stand Pivot      AM-PAC 6 CLICK MOBILITY          Therapeutic Activities and Exercises:   Gentle repetitive exercises for left shoulder excursions    Gentle assistive exercises for the left lower extremity to decrease sympotoms and restore ease of motion for the left leg and knee .    Patient left HOB elevated with call button in reach, NSG notified and family members present..    GOALS:    Physical Therapy Goals        Problem: Physical Therapy Goal    Goal Priority Disciplines Outcome Goal Variances Interventions   Physical Therapy Goal     PT/OT, PT      Description:  Goals to be met by: 2018     Patient will increase functional independence with mobility by performin. Supine to sit with Stand-by Assistance  2. Sit to supine with Stand-by Assistance  3. Rolling to Left and Right with Stand-by Assistance.  4. Sit to stand transfer with Stand-by Assistance, using bronson-walker  5. Bed to chair transfer with Stand-by Assistance using Bronson-walker  6. Gait  x 50 feet with Stand-by Assistance using Bronson-walker.              Problem: Physical Therapy Goal    Goal Priority Disciplines Outcome Goal Variances Interventions   Physical Therapy Goal     PT/OT, PT      Description:  Ongoing efforts  to achieved previously documented goals           Problem: Physical Therapy Goal    Goal Priority Disciplines Outcome Goal Variances Interventions   Physical Therapy Goal     PT/OT, PT Ongoing (interventions implemented as appropriate)     Description:  1. Pt will demonstrate hemiwalker use for 25 ft - WBAT Left lower extremiity with minimal assistance for safety                    Time Tracking:     PT Received On: 18  PT Start Time: 842     PT Stop Time: 907  PT Total Time (min): 25 min     Billable Minutes: Therapeutic Activity 15 min, Therapeutic Exercise 10 min and Total Time 25 min    Treatment Type:  Treatment  PT/PTA: PT           Viral Lemos, PT  05/26/2018

## 2018-05-27 LAB
BASOPHILS # BLD AUTO: 0.03 K/UL
BASOPHILS NFR BLD: 0.4 %
DIFFERENTIAL METHOD: ABNORMAL
EOSINOPHIL # BLD AUTO: 0.1 K/UL
EOSINOPHIL NFR BLD: 1.1 %
ERYTHROCYTE [DISTWIDTH] IN BLOOD BY AUTOMATED COUNT: 14.5 %
HCT VFR BLD AUTO: 38.6 %
HGB BLD-MCNC: 12.5 G/DL
LYMPHOCYTES # BLD AUTO: 1.6 K/UL
LYMPHOCYTES NFR BLD: 19.7 %
MCH RBC QN AUTO: 29.1 PG
MCHC RBC AUTO-ENTMCNC: 32.4 G/DL
MCV RBC AUTO: 90 FL
MONOCYTES # BLD AUTO: 0.5 K/UL
MONOCYTES NFR BLD: 5.8 %
NEUTROPHILS # BLD AUTO: 6.1 K/UL
NEUTROPHILS NFR BLD: 73 %
PLATELET # BLD AUTO: 455 K/UL
PMV BLD AUTO: 9.6 FL
RBC # BLD AUTO: 4.29 M/UL
WBC # BLD AUTO: 8.28 K/UL

## 2018-05-27 PROCEDURE — 25000003 PHARM REV CODE 250: Performed by: FAMILY MEDICINE

## 2018-05-27 PROCEDURE — 63600175 PHARM REV CODE 636 W HCPCS: Performed by: NURSE PRACTITIONER

## 2018-05-27 PROCEDURE — 97110 THERAPEUTIC EXERCISES: CPT

## 2018-05-27 PROCEDURE — A4216 STERILE WATER/SALINE, 10 ML: HCPCS | Performed by: FAMILY MEDICINE

## 2018-05-27 PROCEDURE — 11000004 HC SNF PRIVATE

## 2018-05-27 PROCEDURE — 94761 N-INVAS EAR/PLS OXIMETRY MLT: CPT

## 2018-05-27 PROCEDURE — 94640 AIRWAY INHALATION TREATMENT: CPT

## 2018-05-27 PROCEDURE — 25000242 PHARM REV CODE 250 ALT 637 W/ HCPCS: Performed by: NURSE PRACTITIONER

## 2018-05-27 PROCEDURE — 25000003 PHARM REV CODE 250: Performed by: NURSE PRACTITIONER

## 2018-05-27 PROCEDURE — 36415 COLL VENOUS BLD VENIPUNCTURE: CPT

## 2018-05-27 PROCEDURE — 85025 COMPLETE CBC W/AUTO DIFF WBC: CPT

## 2018-05-27 PROCEDURE — 94799 UNLISTED PULMONARY SVC/PX: CPT

## 2018-05-27 RX ADMIN — ATORVASTATIN CALCIUM 20 MG: 20 TABLET, FILM COATED ORAL at 08:05

## 2018-05-27 RX ADMIN — IPRATROPIUM BROMIDE AND ALBUTEROL SULFATE 3 ML: 2.5; .5 SOLUTION RESPIRATORY (INHALATION) at 07:05

## 2018-05-27 RX ADMIN — DOCUSATE SODIUM 100 MG: 100 CAPSULE, LIQUID FILLED ORAL at 08:05

## 2018-05-27 RX ADMIN — SODIUM CHLORIDE, PRESERVATIVE FREE 10 ML: 5 INJECTION INTRAVENOUS at 08:05

## 2018-05-27 RX ADMIN — AMITRIPTYLINE HYDROCHLORIDE 25 MG: 25 TABLET, FILM COATED ORAL at 08:05

## 2018-05-27 RX ADMIN — MORPHINE SULFATE 2 MG: 4 INJECTION INTRAVENOUS at 02:05

## 2018-05-27 RX ADMIN — HYDROCODONE BITARTRATE AND ACETAMINOPHEN 1 TABLET: 5; 325 TABLET ORAL at 04:05

## 2018-05-27 RX ADMIN — MORPHINE SULFATE 2 MG: 4 INJECTION INTRAVENOUS at 03:05

## 2018-05-27 RX ADMIN — ENOXAPARIN SODIUM 40 MG: 100 INJECTION SUBCUTANEOUS at 04:05

## 2018-05-27 RX ADMIN — METOPROLOL SUCCINATE 50 MG: 50 TABLET, EXTENDED RELEASE ORAL at 08:05

## 2018-05-27 RX ADMIN — MUPIROCIN: 20 OINTMENT TOPICAL at 09:05

## 2018-05-27 RX ADMIN — ONDANSETRON 4 MG: 2 SOLUTION INTRAMUSCULAR; INTRAVENOUS at 12:05

## 2018-05-27 RX ADMIN — PANTOPRAZOLE SODIUM 40 MG: 40 TABLET, DELAYED RELEASE ORAL at 08:05

## 2018-05-27 RX ADMIN — PROMETHAZINE HYDROCHLORIDE 12.5 MG: 25 INJECTION INTRAMUSCULAR; INTRAVENOUS at 01:05

## 2018-05-27 RX ADMIN — AMLODIPINE BESYLATE 5 MG: 5 TABLET ORAL at 08:05

## 2018-05-27 RX ADMIN — LIDOCAINE HYDROCHLORIDE: 20 SOLUTION ORAL; TOPICAL at 08:05

## 2018-05-27 RX ADMIN — HYDROCODONE BITARTRATE AND ACETAMINOPHEN 1 TABLET: 5; 325 TABLET ORAL at 08:05

## 2018-05-27 RX ADMIN — SODIUM CHLORIDE, PRESERVATIVE FREE 10 ML: 5 INJECTION INTRAVENOUS at 09:05

## 2018-05-27 RX ADMIN — HYDROCODONE BITARTRATE AND ACETAMINOPHEN 1 TABLET: 5; 325 TABLET ORAL at 11:05

## 2018-05-27 RX ADMIN — DULOXETINE 30 MG: 30 CAPSULE, DELAYED RELEASE ORAL at 08:05

## 2018-05-27 NOTE — PROGRESS NOTES
Staff Handoff    Bedside report received from CECILIA Ang. Patient lying in bed. No distress noted. VS stable. C/o left lower leg pain rated 6/10. Daughter at bedside. Assessment complete per flowsheet. Bed locked and in lowest position. Call bell in reach.   Resident Handoff

## 2018-05-27 NOTE — PT/OT/SLP PROGRESS
Physical Therapy Treatment    Patient Name:  Merari Romero   MRN:  [id number]    Recommendations:     Discharge Recommendations:  home with home health, home health PT, home health OT   Discharge Equipment Recommendations: walker, flora   Barriers to discharge: None    Assessment:     Merari Romero is a 78 y.o. female admitted with a medical diagnosis of <principal problem not specified>.  She presents with the following impairments/functional limitations:  impaired self care skills, impaired functional mobilty, gait instability, decreased lower extremity function, decreased upper extremity function .    Rehab Prognosis: good; patient would benefit from acute skilled PT services to address these deficits and reach maximum level of function.      Recent Surgery: * No surgery found *      Plan:     During this hospitalization, patient to be seen  (1-2x/day(M-F); PRN(Sat.,Sun.)) to address the above listed problems via gait training, therapeutic activities, therapeutic exercises  · Plan of Care Expires:   (Upon d/c from facility)   Plan of Care Reviewed with: patient, spouse, daughter    Subjective     Communicated with patient, spouse, daughter prior to session.  Patient found spine upon PT entry to room, agreeable to treatment.      Chief Complaint: 1. Left knee. 2 Left arm, 3. Ulcer ?  Patient comments/goals: to be able to walk and have less pain  Pain/Comfort:  · Pain Rating 1: 5/10  · Location - Side 1: Left  · Location - Orientation 1: generalized  · Location 1: leg  · Pain Rating Post-Intervention 1: 5/10    Patients cultural, spiritual, Episcopal conflicts given the current situation: none verbalized    Objective:     Patient found with:       General Precautions: Standard, fall   Orthopedic Precautions:LUE non weight bearing, LLE weight bearing as tolerated   Braces:       Functional Mobility:  ·       AM-PAC 6 CLICK MOBILITY          Therapeutic Activities and Exercises:    Multiple active and assistive exercises  to increase motion and functional strength of the left shoulder.    Multilple repetitive assistive and active exercises for the entire left lower extremity: to increase fluid dynamics, strentgth, and stability for  muscle function -addressisng   hip, leg, knee and ankle   Reinforced the need for increased patient  Participation  in all  therapeutic exercises and  Transfer tasks throughout the day.    Patient left HOB elevated with call button in reach, NSG notified and family present..    GOALS:    Physical Therapy Goals        Problem: Physical Therapy Goal    Goal Priority Disciplines Outcome Goal Variances Interventions   Physical Therapy Goal     PT/OT, PT      Description:  Goals to be met by: 2018     Patient will increase functional independence with mobility by performin. Supine to sit with Stand-by Assistance  2. Sit to supine with Stand-by Assistance  3. Rolling to Left and Right with Stand-by Assistance.  4. Sit to stand transfer with Stand-by Assistance, using bronson-walker  5. Bed to chair transfer with Stand-by Assistance using Bronson-walker  6. Gait  x 50 feet with Stand-by Assistance using Bronson-walker.              Problem: Physical Therapy Goal    Goal Priority Disciplines Outcome Goal Variances Interventions   Physical Therapy Goal     PT/OT, PT      Description:  Ongoing efforts  to achieved previously documented goals           Problem: Physical Therapy Goal    Goal Priority Disciplines Outcome Goal Variances Interventions   Physical Therapy Goal     PT/OT, PT Ongoing (interventions implemented as appropriate)     Description:  1. Pt will demonstrate hemiwalker use for 25 ft - WBAT Left lower extremiity with minimal assistance for safety           Problem: Physical Therapy Goal    Goal Priority Disciplines Outcome Goal Variances Interventions   Physical Therapy Goal     PT/OT, PT      Description:  Ongoing efforts to achieve stated goals of transfers and self help activities                      Time Tracking:     PT Received On: 05/27/18  PT Start Time: 0846     PT Stop Time: 0902  PT Total Time (min): 16 min     Billable Minutes: Therapeutic Exercise 16 min and Total Time 16 min    Treatment Type: Treatment  PT/PTA: PT           Viral Lemos, PT  05/27/2018

## 2018-05-27 NOTE — PLAN OF CARE
Problem: Patient Care Overview  Goal: Plan of Care Review  Outcome: Ongoing (interventions implemented as appropriate)  Pt remains on swing bed for continued physical therapy. Pt unable to put weight on left secondary to left knee pain. Ortho to be called tomorrow morning bc of pts lack of progress due to knee pain. Prior total knee arthoplasty 8yrs ago. Pt frequently participating in exercises in bed and able to transfer to commode; assisting in repositioning in bed.  Occasional complaints of pain; relieved with PO and IV medication. Soft cast to left arm; abx ointment placed to upper arm skin tear and dressing changed. lovenox daily for DVT prophy. Complaints of indigestion this moring; relieved with GI cocktail; available PRN. Vitals stable. Incentive spirometry at bedside and being used. Call bell within reach. Bed in low, locked position. Reviewed plan of care with pt and family; state agreement.

## 2018-05-27 NOTE — PLAN OF CARE
Problem: Patient Care Overview  Goal: Plan of Care Review  Outcome: Ongoing (interventions implemented as appropriate)  Patient aware of plan of care. VS stable. C/o left lower leg pain mildly relieved with hydrocodone tablet and IV morphine for breakthrough pain. Patient given ambien for c/o trouble getting to sleep. Patient c/o indigestion with N/V, carafate, zofran, and phenergan given. Up to BSC with assistance. Patient had a difficult time getting from bed to BSC due to pain from lower leg. Soft cast to LUE, elevated with pillows. PT. Free from falls/injuries. No questions or concerns at this time. Agrees with plan of care.

## 2018-05-27 NOTE — NURSING
Report received from Lisa BROOKS. Pt lying in bed with daughter at side. Vitals stable. Call bell within reach.

## 2018-05-28 PROCEDURE — 11000004 HC SNF PRIVATE

## 2018-05-28 PROCEDURE — 25000003 PHARM REV CODE 250: Performed by: NURSE PRACTITIONER

## 2018-05-28 PROCEDURE — 94761 N-INVAS EAR/PLS OXIMETRY MLT: CPT

## 2018-05-28 PROCEDURE — 63600175 PHARM REV CODE 636 W HCPCS: Performed by: NURSE PRACTITIONER

## 2018-05-28 PROCEDURE — 99232 SBSQ HOSP IP/OBS MODERATE 35: CPT | Mod: ,,, | Performed by: FAMILY MEDICINE

## 2018-05-28 PROCEDURE — A4216 STERILE WATER/SALINE, 10 ML: HCPCS | Performed by: FAMILY MEDICINE

## 2018-05-28 PROCEDURE — 99900035 HC TECH TIME PER 15 MIN (STAT)

## 2018-05-28 PROCEDURE — 94799 UNLISTED PULMONARY SVC/PX: CPT

## 2018-05-28 PROCEDURE — 97530 THERAPEUTIC ACTIVITIES: CPT

## 2018-05-28 PROCEDURE — 94640 AIRWAY INHALATION TREATMENT: CPT

## 2018-05-28 PROCEDURE — 97110 THERAPEUTIC EXERCISES: CPT

## 2018-05-28 PROCEDURE — 25000242 PHARM REV CODE 250 ALT 637 W/ HCPCS: Performed by: NURSE PRACTITIONER

## 2018-05-28 PROCEDURE — 25000003 PHARM REV CODE 250: Performed by: FAMILY MEDICINE

## 2018-05-28 RX ORDER — DEXAMETHASONE SODIUM PHOSPHATE 4 MG/ML
8 INJECTION, SOLUTION INTRA-ARTICULAR; INTRALESIONAL; INTRAMUSCULAR; INTRAVENOUS; SOFT TISSUE ONCE
Status: COMPLETED | OUTPATIENT
Start: 2018-05-28 | End: 2018-05-28

## 2018-05-28 RX ORDER — DEXAMETHASONE SODIUM PHOSPHATE 4 MG/ML
6 INJECTION, SOLUTION INTRA-ARTICULAR; INTRALESIONAL; INTRAMUSCULAR; INTRAVENOUS; SOFT TISSUE ONCE
Status: COMPLETED | OUTPATIENT
Start: 2018-05-29 | End: 2018-05-29

## 2018-05-28 RX ORDER — DEXAMETHASONE SODIUM PHOSPHATE 4 MG/ML
4 INJECTION, SOLUTION INTRA-ARTICULAR; INTRALESIONAL; INTRAMUSCULAR; INTRAVENOUS; SOFT TISSUE ONCE
Status: COMPLETED | OUTPATIENT
Start: 2018-05-29 | End: 2018-05-29

## 2018-05-28 RX ADMIN — AMLODIPINE BESYLATE 5 MG: 5 TABLET ORAL at 08:05

## 2018-05-28 RX ADMIN — HYDROCODONE BITARTRATE AND ACETAMINOPHEN 1 TABLET: 5; 325 TABLET ORAL at 11:05

## 2018-05-28 RX ADMIN — SODIUM CHLORIDE, PRESERVATIVE FREE 10 ML: 5 INJECTION INTRAVENOUS at 08:05

## 2018-05-28 RX ADMIN — MUPIROCIN: 20 OINTMENT TOPICAL at 08:05

## 2018-05-28 RX ADMIN — METOPROLOL SUCCINATE 50 MG: 50 TABLET, EXTENDED RELEASE ORAL at 08:05

## 2018-05-28 RX ADMIN — HYDROCODONE BITARTRATE AND ACETAMINOPHEN 1 TABLET: 5; 325 TABLET ORAL at 04:05

## 2018-05-28 RX ADMIN — IPRATROPIUM BROMIDE AND ALBUTEROL SULFATE 3 ML: 2.5; .5 SOLUTION RESPIRATORY (INHALATION) at 06:05

## 2018-05-28 RX ADMIN — PANTOPRAZOLE SODIUM 40 MG: 40 TABLET, DELAYED RELEASE ORAL at 08:05

## 2018-05-28 RX ADMIN — DEXAMETHASONE SODIUM PHOSPHATE 8 MG: 4 INJECTION, SOLUTION INTRAMUSCULAR; INTRAVENOUS at 06:05

## 2018-05-28 RX ADMIN — DULOXETINE 30 MG: 30 CAPSULE, DELAYED RELEASE ORAL at 08:05

## 2018-05-28 RX ADMIN — ATORVASTATIN CALCIUM 20 MG: 20 TABLET, FILM COATED ORAL at 08:05

## 2018-05-28 RX ADMIN — SODIUM CHLORIDE, PRESERVATIVE FREE 10 ML: 5 INJECTION INTRAVENOUS at 09:05

## 2018-05-28 RX ADMIN — DOCUSATE SODIUM 100 MG: 100 CAPSULE, LIQUID FILLED ORAL at 08:05

## 2018-05-28 RX ADMIN — HYDROCODONE BITARTRATE AND ACETAMINOPHEN 1 TABLET: 5; 325 TABLET ORAL at 03:05

## 2018-05-28 RX ADMIN — IPRATROPIUM BROMIDE AND ALBUTEROL SULFATE 3 ML: 2.5; .5 SOLUTION RESPIRATORY (INHALATION) at 07:05

## 2018-05-28 RX ADMIN — HYDROCODONE BITARTRATE AND ACETAMINOPHEN 1 TABLET: 5; 325 TABLET ORAL at 09:05

## 2018-05-28 RX ADMIN — HYDROCODONE BITARTRATE AND ACETAMINOPHEN 1 TABLET: 5; 325 TABLET ORAL at 07:05

## 2018-05-28 RX ADMIN — DOCUSATE SODIUM 100 MG: 100 CAPSULE, LIQUID FILLED ORAL at 09:05

## 2018-05-28 RX ADMIN — ENOXAPARIN SODIUM 40 MG: 100 INJECTION SUBCUTANEOUS at 05:05

## 2018-05-28 RX ADMIN — AMITRIPTYLINE HYDROCHLORIDE 25 MG: 25 TABLET, FILM COATED ORAL at 09:05

## 2018-05-28 NOTE — PLAN OF CARE
Problem: Patient Care Overview  Goal: Plan of Care Review  Outcome: Ongoing (interventions implemented as appropriate)  Patient seen again by [facility] NP. xrays completed: elbow and lumbar spine. PT/OT. Safety and comfort maintained. Agrees with plan of care. No further questions.

## 2018-05-28 NOTE — PLAN OF CARE
Problem: Patient Care Overview  Goal: Plan of Care Review  Outcome: Ongoing (interventions implemented as appropriate)  Patient aware of plan of care. Patient had better night. VS stable, afebrile. Up to BSC with assistance with c/o left lower leg pain. hydrocodone given. PT. IS. Consult to Ortho to determine the delay in progress from the left lower leg pain. Free from falls/injuries. No questions or concerns at this time. Agrees with plan of care.

## 2018-05-28 NOTE — PT/OT/SLP PROGRESS
"Physical Therapy Treatment    Patient Name:  Merari Romero   MRN:  [id number]    Recommendations:     Discharge Recommendations:  home with home health, home health PT   Discharge Equipment Recommendations: walker, rolling, walker, flora, wheelchair, bath bench   Barriers to discharge: None    Assessment:     Merari Romero is a 78 y.o. female admitted with a medical diagnosis of <principal problem not specified>.  She presents with the following impairments/functional limitations:  weakness, impaired endurance, impaired self care skills, impaired functional mobilty, gait instability, impaired balance, decreased upper extremity function, decreased lower extremity function, decreased safety awareness, pain Pt continues with increased left knee pain with all mobility attempts.    Rehab Prognosis:  Fair; patient would benefit from acute skilled PT services to address these deficits and reach maximum level of function.      Recent Surgery: * No surgery found *      Plan:     During this hospitalization, patient to be seen  (1-2x/day Monday-Friday; PRN Weekends/Holidays) to address the above listed problems via therapeutic activities, therapeutic exercises, gait training  · Plan of Care Expires:   (upon D/C from facility)   Plan of Care Reviewed with: patient, daughter, spouse    Subjective     Communicated with patient prior to session.  Patient found supine in bed upon PT entry to room, agreeable to treatment.      Chief Complaint: "My leg hurts"  Patient comments/goals: "Go home"  Pain/Comfort:  · Pain Rating 1: 8/10  · Location - Side 1: Left  · Location - Orientation 1: generalized  · Location 1: knee  · Pain Addressed 1: Nurse notified    Patients cultural, spiritual, Hindu conflicts given the current situation:      Objective:     Patient found with:       General Precautions: Standard, fall   Orthopedic Precautions:LUE non weight bearing, LLE weight bearing as tolerated   Braces: N/A     Functional Mobility:  · Bed " Mobility:     · Rolling Left:  stand by assistance  · Rolling Right: stand by assistance  · Scooting: stand by assistance  · Supine to Sit: moderate assistance  · Sit to Supine: moderate assistance  · Transfers:     · Sit to Stand:  moderate assistance with hemiwalker      AM-PAC 6 CLICK MOBILITY  Turning over in bed (including adjusting bedclothes, sheets and blankets)?: 2  Sitting down on and standing up from a chair with arms (e.g., wheelchair, bedside commode, etc.): 2  Moving from lying on back to sitting on the side of the bed?: 2  Moving to and from a bed to a chair (including a wheelchair)?: 2  Need to walk in hospital room?: 1  Climbing 3-5 steps with a railing?: 1  Total Score: 10       Therapeutic Activities and Exercises:   Pt performed BLE therapeutic exercises A/A on LLE with rest breaks as needed to increase strength and endurance with all gross mobility skills.     Patient left up in chair with all lines intact, call button in reach, NSG notified and family present..    GOALS:    Physical Therapy Goals        Problem: Physical Therapy Goal    Goal Priority Disciplines Outcome Goal Variances Interventions   Physical Therapy Goal     PT/OT, PT      Description:  Goals to be met by: 2018     Patient will increase functional independence with mobility by performin. Supine to sit with Stand-by Assistance  2. Sit to supine with Stand-by Assistance  3. Rolling to Left and Right with Stand-by Assistance.  4. Sit to stand transfer with Stand-by Assistance, using bronson-walker  5. Bed to chair transfer with Stand-by Assistance using Bronson-walker  6. Gait  x 50 feet with Stand-by Assistance using Bronson-walker.              Problem: Physical Therapy Goal    Goal Priority Disciplines Outcome Goal Variances Interventions   Physical Therapy Goal     PT/OT, PT      Description:  Ongoing efforts  to achieved previously documented goals           Problem: Physical Therapy Goal    Goal Priority Disciplines  Outcome Goal Variances Interventions   Physical Therapy Goal     PT/OT, PT Ongoing (interventions implemented as appropriate)     Description:  1. Pt will demonstrate hemiwalker use for 25 ft - WBAT Left lower extremiity with minimal assistance for safety           Problem: Physical Therapy Goal    Goal Priority Disciplines Outcome Goal Variances Interventions   Physical Therapy Goal     PT/OT, PT      Description:  Ongoing efforts to achieve stated goals of transfers and self help activities                     Time Tracking:     PT Received On: 05/28/18  PT Start Time: 1346     PT Stop Time: 1409  PT Total Time (min): 23 min     Billable Minutes: Therapeutic Activity 13 minutes and Therapeutic Exercise 10 minutes    Treatment Type: Treatment  PT/PTA: PT           Paige Ramirez, PT  05/28/2018

## 2018-05-28 NOTE — PLAN OF CARE
05/28/18 1159   Discharge Assessment   Assessment Type Discharge Planning Reassessment     Discussed discharge plan with Dr Tabor and patient. Dr Tabro will not discharge home today until orthopedics sees patient for her L knee, foot and ankle pain. She is unable to progress in therapy. Called [facility] to extend skilled stay. They are closed for the holiday.

## 2018-05-28 NOTE — PROGRESS NOTES
Staff Handoff  Bedside report received from CECILIA Mijares. Patient resting in bed. No distress noted. VS stable. C/o left lower leg pain, rated 6/10. Assessment complete per flowsheet. Bed locked and in lowest position. Call bell in reach.     Resident Handoff

## 2018-05-28 NOTE — PROGRESS NOTES
[facility]  Orthopedic Surgery  Consult Note    Patient Name: Merari Romero  MRN: [id number]  Admission Date: 5/17/2018  Hospital Length of Stay: 11  Code Status: Full Code  Attending Physician: Michael Chacon MD  Primary Care Physician: Shaun Barraza MD  Principal Problem:<principal problem not specified>      Subjective:     HPI: Patient with history of falling and suffering a left pubic ramus fracture and left radial head fracture. Has been hospitalized since falling 5/17/18.  Since 2 days c/o weakness in left leg and left ankle pain. Ortho asked to evaluate for causes of left leg weakness and also progress of healing of left radial head fracture.       Review of Systems:  Constitutional: no fever or chills  Respiratory: no cough or shorness of breath  Cardiovascular: no chest pain or palpitations  Gastrointestinal: no vomiting  Musculoskeletal: admits to inability to lift left leg when standing.       Past Medical History:   Diagnosis Date    DVT (deep venous thrombosis)     left leg    Hypertension     Neuropathy     Osteoporosis        Past Surgical History:   Procedure Laterality Date    FEMUR SURGERY      repair - left leg    HYSTERECTOMY  1970's    JOINT REPLACEMENT      left knee    NASAL FRACTURE SURGERY         Family History   Problem Relation Age of Onset    Adopted: Yes    Heart disease Mother     Heart disease Father     Breast cancer Neg Hx     Colon cancer Neg Hx     Ovarian cancer Neg Hx        Social History   Substance Use Topics    Smoking status: Former Smoker     Years: 40.00     Types: Cigarettes     Quit date: 8/9/2000    Smokeless tobacco: Never Used    Alcohol use No       Prior to Admission medications    Medication Sig Start Date End Date Taking? Authorizing Provider   amitriptyline (ELAVIL) 25 MG tablet Take 25 mg by mouth every evening. 2/6/18  Yes Historical Provider, MD   amlodipine (NORVASC) 5 MG tablet take 1 tablet by mouth once daily  5/1/14  Yes Rosa Vasquez NP   atorvastatin (LIPITOR) 20 MG tablet Take 20 mg by mouth once daily. 3/8/16  Yes Historical Provider, MD   cilostazol (PLETAL) 50 MG Tab Take 50 mg by mouth 2 (two) times daily.   Yes Historical Provider, MD   duloxetine (CYMBALTA) 30 MG capsule Take 1 capsule (30 mg total) by mouth once daily. 3/14/17 5/17/18 Yes Anne Marie Sandra NP   fexofenadine (ALLEGRA) 60 MG tablet Take 1 tablet (60 mg total) by mouth once daily. 8/9/12  Yes Rosa Vasquez NP   hydrocodone-acetaminophen 7.5-325mg (NORCO) 7.5-325 mg per tablet Take 1 tablet by mouth 3 (three) times daily. 2/26/16  Yes Historical Provider, MD   metoprolol succinate (TOPROL-XL) 50 MG 24 hr tablet  12/19/17  Yes Historical Provider, MD   omeprazole (PRILOSEC) 40 MG capsule  11/30/17  Yes Historical Provider, MD   ranitidine (ZANTAC) 300 MG tablet  11/30/17  Yes Historical Provider, MD   ipratropium (ATROVENT) 0.02 % nebulizer solution Take 2.5 mLs (0.5 mg total) by nebulization 4 (four) times daily. 4/15/14 4/15/15  Janae Daly NP   ketoconazole (NIZORAL) 2 % cream  1/21/16   Historical Provider, MD   meclizine (ANTIVERT) 25 mg tablet Take 25 mg by mouth every 6 (six) hours.  2/5/16   Historical Provider, MD     Objective:     Vital Signs (Most Recent):     Vital Signs (24h Range): Temp:  [96.6 °F (35.9 °C)-97.3 °F (36.3 °C)] 96.6 °F (35.9 °C)  Pulse:  [63-91] 76  Resp:  [16-18] 18  SpO2:  [93 %-97 %] 93 %  BP: (111-176)/(62-82) 111/67         Body mass index is 24.53 kg/m².      Intake/Output Summary (Last 24 hours) at 05/28/18 0861  Last data filed at 05/28/18 1259   Gross per 24 hour   Intake              580 ml   Output                0 ml   Net              580 ml     Intake/Output - Last 3 Shifts       05/26 0700 - 05/27 0659 05/27 0700 - 05/28 0659 05/28 0700 - 05/29 0659    P.O. 360  580    IV Piggyback 50      Total Intake(mL/kg) 410 (6.4)  580 (9.2)    Net +410   +580           Urine Occurrence 4 x 2 x  3 x    Stool Occurrence 1 x            Lines/Drains/Airways     Peripheral Intravenous Line                 Peripheral IV - Single Lumen 05/27/18 0312 Right Antecubital 1 day                   Physical Exam:  General: mild distress secondary to pelvic/low back pain  Lungs: normal respiratory effort   Pulses: all distal pulses normal   Extremities: strength equal in bilateral legs while patient in bed. Sensory deficit in left leg compared to right. Passive ROM in left knee 0-110 degrees before patient c/o pain. Full AROM left ankle.            Significant Labs:   Recent Lab Results       05/27/18  0923      Baso # 0.03     Basophil% 0.4     Differential Method Automated     Eos # 0.1     Eosinophil% 1.1     Gran # (ANC) 6.1     Gran% 73.0     Hematocrit 38.6     Hemoglobin 12.5     Lymph # 1.6     Lymph% 19.7     MCH 29.1     MCHC 32.4     MCV 90     Mono # 0.5     Mono% 5.8     MPV 9.6     Platelets 455(H)     RBC 4.29     RDW 14.5     WBC 8.28           Significant Imaging:   Imaging Results    None         Scheduled Meds:   albuterol-ipratropium  3 mL Nebulization Q12H    amitriptyline  25 mg Oral QHS    amLODIPine  5 mg Oral Daily    atorvastatin  20 mg Oral Daily    [START ON 5/29/2018] dexamethasone  4 mg Intravenous Once    [START ON 5/29/2018] dexamethasone  6 mg Intravenous Once    dexamethasone  8 mg Intravenous Once    docusate sodium  100 mg Oral BID    DULoxetine  30 mg Oral Daily    enoxaparin  40 mg Subcutaneous Daily    metoprolol succinate  50 mg Oral Daily    mupirocin   Topical (Top) Daily    pantoprazole  40 mg Oral Daily    sodium chloride 0.9%  10 mL Intravenous BID     Continuous Infusions:  PRN Meds:.acetaminophen, GI cocktail (mylanta 30 mL, lidocaine 2 % viscous 10 mL, dicyclomine 10 mL) 50 mL, HYDROcodone-acetaminophen, morphine, ondansetron, promethazine (PHENERGAN) IVPB, zolpidem    Assessment and Plan:     Patient Active Problem List   Diagnosis    HTN (hypertension)     Neuropathy of lower extremity    Seasonal allergic rhinitis    Lumbar facet arthropathy    Cervical spondylosis    Neuralgia and neuritis    OA (osteoarthritis) left foot    Lower back pain    Hyperlipidemia LDL goal <70    Closed nondisplaced fracture of neck of left radius    Closed fracture of left pubis    History of deep venous thrombosis    Bed confinement status    Urinary hesitancy    Fall    Debility    Inadequate dietary energy intake    Left hip pain    Pain in left lower leg       ASSESSMENT:    Left radial head fracture, non-displaced  Left pubic ramus fracture  Left leg weakness    PLAN:      XR left elbow, will probably progress ROM of left elbow after films reviewed  Continue WBAT, BLE   XR lumbar spine to evaluate for presence of degenerative changes and possible spondylolisthesis  Decadron IV taper beginning tonight-discussed with medical team and they concur with this option  Re-evaluate tomorrow for positive response to IV steroids and possible DC for outpatient follow-up    Thank you for allowing me to see your patient. Please let me know if you have any further questions.       Lenny Urbina NP  Department of Orthopedic Surgery  [place]

## 2018-05-28 NOTE — PROGRESS NOTES
[facility] Hospital Medicine  Progress Note    Patient Name: Merari Romero  MRN: [id number]  Patient Class: IP- Swing   Admission Date: 5/17/2018  Length of Stay: 11 days  Attending Physician: Michael Chacon MD  Primary Care Provider: Shaun Barraza MD        Subjective:     Principal Problem:<principal problem not specified>    HPI:  5/18/18 SWING ADMIT  78 year old female admitted 5/11/18 for Closed nondisplaced fracture of neck of left radius after a falll going up stairs, slipped on first step.. She was admitted for pain control and PT. She is progressing with PT but slow. Did not qualify for inpatient rehab as can not tolerate 3hr of activity.    SWING placement yesterday for continued PT/OT . She was living at home with  prior to fall and able to take care of self and him.  Patient had a restful night. VS stable. Some  C/o left sided pelvic pain, relieved with hydrocodone x2 and morphine x1 for breakthrough pain. Ace wrap and sling to left arm intact. She reports she tolerated PT well yesterday.  Encouraged continue IS.    and daughter at bedside, supportive. Pt. amb. 10' with hemiwalker, mod. A. No c/o SOB or fevers this morning.    Hospital Course:  5/21/18  She is doing well. Skin tears healing well. She is ambulating 20 ft with PT using rolling walker. Her goal is 50 ft. She has only used norco PO over last 24hr to control her pain. She remains on RA with POX 93-96%. Has IS at bedside. Using PRN. No fever. On lovonox SQ for DVT prophylaxis. Daughter at bedside reports that they would like to take her home rather than send her to INPT rehab if she is strong enough by D/c    5/23/18  Yesterday did well with therapy. Using stand by assist she was able to do all bed mobility, and used contact guard assist to transfer from sit to stand and ambulated with the same assist using flora walker 15 ft. This is better than the weekend where she was still requiring min assist per PT.  She is progressing towards her goal of stand by assist 50ft using flora walker.     She has not required morphine for breakthrough pain since the 5/21/18. Using norco PRN for pain.     VSS/afebrile, maintaining sats on RA, IS at bedside and nebs BID. lovenox daily     C/o left foot pain today. She reports that the the top of foot to her knee. Has hx of arthritis. Just started yesterday Pain meds help the pain in foot as well as hip  5/24/18 Brief MD; pt with c/o persistent Left LE pain; tender and hurts with movement; also notes left hip pain with rotation. She cannot dorsiflex without pain and it is limiting her ability to ambulate/participate in PT  5/25/18 Pt still with c/o left LE pain; US negative for DVT; symptoms sound consistent with plantar/ dorsal fascitis. Pain under heel and to top of foot; worse when she just starts to walk after getting out of bed  One toe with slight redness. Notes hx of neuropathy but stopped taking her meds.   She does have pain to left hip with rotation but has + fractures involving the left symphysis pubis and the inferior ramus.     5/28/18 She has been on SWING for 11 days. She was doing well then started with left ankle and foot pain. She is no longer ambulating with PT due to left foot and ankle pain. U/s completed negative for DVT. Uric acid 4.6. She was given NSAID without relief. Dr Ramirez on call today for ortho. Nurse left voice mail with him for eval of patients foot/ankle pain hindering her rehab. X rays ordered. She also is back to using IV pain meds when she had weaned to po only    Interval History: see HC     Review of Systems   Constitutional: Negative for chills and fever.   Respiratory: Negative for shortness of breath.    Cardiovascular: Negative for chest pain and palpitations.   Gastrointestinal: Negative for abdominal pain, blood in stool, constipation and nausea.   Genitourinary: Negative for difficulty urinating and pelvic pain.   Musculoskeletal: Positive  for arthralgias (left ankle) and myalgias.        Left hip pain with moving as well as left arm pain with moving   Skin:        Left middle toe bruised   Psychiatric/Behavioral: Negative for dysphoric mood, sleep disturbance and suicidal ideas. The patient is not nervous/anxious.      Objective:     Vital Signs (Most Recent):  Temp: 96.7 °F (35.9 °C) (05/28/18 0333)  Pulse: 72 (05/28/18 0757)  Resp: 18 (05/28/18 0757)  BP: 133/70 (05/28/18 0757)  SpO2: 96 % (05/28/18 0737) Vital Signs (24h Range):  Temp:  [96.3 °F (35.7 °C)-97.9 °F (36.6 °C)] 96.7 °F (35.9 °C)  Pulse:  [18-76] 72  Resp:  [16-18] 18  SpO2:  [93 %-97 %] 96 %  BP: (107-176)/(58-82) 133/70     Weight: 62.8 kg (138 lb 8 oz)  Body mass index is 24.53 kg/m².  No intake or output data in the 24 hours ending 05/28/18 0816   Physical Exam   Constitutional: She is oriented to person, place, and time. She appears well-developed. No distress.   HENT:   Head: Normocephalic and atraumatic.   Eyes: Conjunctivae are normal. Pupils are equal, round, and reactive to light.   Neck: Normal range of motion. Neck supple. No thyromegaly present.   Cardiovascular: Normal rate, regular rhythm, normal heart sounds and intact distal pulses.    Pulmonary/Chest: Effort normal and breath sounds normal. No respiratory distress. She has no wheezes.   Abdominal: Soft. Bowel sounds are normal. There is no tenderness.   Musculoskeletal: She exhibits tenderness (hip bilaterally. + left with rotation). She exhibits no edema.   Left sling in place    Left foot with MTP arthritic changes     +Homans   Lymphadenopathy:     She has no cervical adenopathy.   Neurological: She is alert and oriented to person, place, and time.   Skin: Skin is warm and dry. No rash noted.   Psychiatric: She has a normal mood and affect. Her behavior is normal.   Nursing note and vitals reviewed.      Significant Labs:   Lab Results   Component Value Date    WBC 8.28 05/27/2018    HGB 12.5 05/27/2018    HCT 38.6  05/27/2018    MCV 90 05/27/2018     (H) 05/27/2018     Uric acid 4.6    Significant Imaging:     Left hip x ray No definite evidence of a displaced fracture or dislocation.    The symphysis pubis is limited to evaluation due to angulation and positioning and a pubis fracture cannot be excluded..    CXR 2.3 cm confluence of shadows overlying the left upper lobe.  Consider nonurgent follow-up chest with lateral for further evaluation  .  REPEAT CXR 5/17  Chronic lung changes are seen bilaterally.  Focal region of scarring is seen in the left suprahilar location, less prominent as compared to the previous study of 05/11/2018.  No consolidation or pleural effusions.  The heart is normal in size.  Calcified atheromatous disease affects the aorta.  The bones are osteopenic and show age-appropriate degenerative change.    Left elbow x ray There is a fracture involving the radial neck.  Posterior fat pad is present compatible with an effusion.    Left hip x rays repeat  There are fractures involving the left symphysis pubis and the inferior ramus which were limited to evaluation on the prior study.    No evidence of a fracture of the left hip.  No dislocation.  Vascular calcifications overlying the left femoral head and neck.    US lower ext No evidence of deep venous thrombosis in the left lower extremity.    EKG Normal sinus rhythm  Nonspecific T wave abnormality        CRANIAL NERVES     CN III, IV, VI   Pupils are equal, round, and reactive to light.        Assessment/Plan:      Pain in left lower leg      US of Left LE - rule out DVT (though unlikely, she had a +homans and calf tenderness).negative. Given NSAIDS without relief. Uric acid negative. Check x rays today and re-consult ortho.  Her C/C is her loose L knee prosthesis (8yrs old)        Left hip pain    Treat pain - known fracture. Can bear weight.  With worsening pain, may consider repeat imaging if she can't bear weight on hip, though.           Inadequate dietary energy intake              Debility    Continue PT/OT  IS encouraged.         Fall    Fear of falling. Cont with PT/OT          Urinary hesitancy    U/ a and culture- all normal  Better now that she can get up to BEDS SIDE commode          History of deep venous thrombosis    lovenox 40 daily due to hx of DVT and immobility,    Rechecked left leg US to r/o clot - this was normal.        Closed fracture of left pubis    Non operative   Pain control try and cont po as much as poss  Try and reduce risk of associated complications of limited mobility(IS, nebs, PT)  Discussed with Bo GONZALEZ ortho. Seen patient 5/16, cont non wt bearing to left upper ext with long arm splint and wt bearing as tolerated to left lower ext. F/u Dr Lemos 3 weeks from fall  for repeat films  Cont PT till safe for D/C to home        Closed nondisplaced fracture of neck of left radius    Ortho consulted via ER, non surgical orthopedic injuries per Dr Doc Wolf, non wt bearing injury to left upper ext Splinted   She also can wt bear as tolerated to left lower ext   Bo Urbina NP with ortho came 5/16, no changes, f/u Aminta 3 weeks for repeat films  Undressed splint 5/21 to assess skin tears, healing well, no s/s infection. Will undress biweekly and apply bactroban  5/25/18 Stable- doing well        Hyperlipidemia LDL goal <70    Cont lipitor        OA (osteoarthritis) left foot    Give small dose toradol today - this didn't help.  Try advil- no relief  Check x rays today and consult ortho  U/s for DVT negative as well as uric acid          Neuropathy of lower extremity    Cont cymbalta and amitriptyline may consider lyrica at home          HTN (hypertension)    /70  Well controlled on amlodipine and toprol          VTE Risk Mitigation         Ordered     enoxaparin injection 40 mg  Daily      05/17/18 1159     IP VTE HIGH RISK PATIENT  Once      05/17/18 1158     Place sequential compression device  Until  discontinued      05/17/18 1158              Lance Tabor MD  Department of Hospital Medicine   [facility]

## 2018-05-28 NOTE — SUBJECTIVE & OBJECTIVE
Interval History: see      Review of Systems   Constitutional: Negative for chills and fever.   Respiratory: Negative for shortness of breath.    Cardiovascular: Negative for chest pain and palpitations.   Gastrointestinal: Negative for abdominal pain, blood in stool, constipation and nausea.   Genitourinary: Negative for difficulty urinating and pelvic pain.   Musculoskeletal: Positive for arthralgias (left ankle) and myalgias.        Left hip pain with moving as well as left arm pain with moving   Skin:        Left middle toe bruised   Psychiatric/Behavioral: Negative for dysphoric mood, sleep disturbance and suicidal ideas. The patient is not nervous/anxious.      Objective:     Vital Signs (Most Recent):  Temp: 96.7 °F (35.9 °C) (05/28/18 0333)  Pulse: 72 (05/28/18 0757)  Resp: 18 (05/28/18 0757)  BP: 133/70 (05/28/18 0757)  SpO2: 96 % (05/28/18 0737) Vital Signs (24h Range):  Temp:  [96.3 °F (35.7 °C)-97.9 °F (36.6 °C)] 96.7 °F (35.9 °C)  Pulse:  [18-76] 72  Resp:  [16-18] 18  SpO2:  [93 %-97 %] 96 %  BP: (107-176)/(58-82) 133/70     Weight: 62.8 kg (138 lb 8 oz)  Body mass index is 24.53 kg/m².  No intake or output data in the 24 hours ending 05/28/18 0816   Physical Exam   Constitutional: She is oriented to person, place, and time. She appears well-developed. No distress.   HENT:   Head: Normocephalic and atraumatic.   Eyes: Conjunctivae are normal. Pupils are equal, round, and reactive to light.   Neck: Normal range of motion. Neck supple. No thyromegaly present.   Cardiovascular: Normal rate, regular rhythm, normal heart sounds and intact distal pulses.    Pulmonary/Chest: Effort normal and breath sounds normal. No respiratory distress. She has no wheezes.   Abdominal: Soft. Bowel sounds are normal. There is no tenderness.   Musculoskeletal: She exhibits tenderness (hip bilaterally. + left with rotation). She exhibits no edema.   Left sling in place    Left foot with MTP arthritic changes     +Homans    Lymphadenopathy:     She has no cervical adenopathy.   Neurological: She is alert and oriented to person, place, and time.   Skin: Skin is warm and dry. No rash noted.   Psychiatric: She has a normal mood and affect. Her behavior is normal.   Nursing note and vitals reviewed.      Significant Labs:   Lab Results   Component Value Date    WBC 8.28 05/27/2018    HGB 12.5 05/27/2018    HCT 38.6 05/27/2018    MCV 90 05/27/2018     (H) 05/27/2018     Uric acid 4.6    Significant Imaging:     Left hip x ray No definite evidence of a displaced fracture or dislocation.    The symphysis pubis is limited to evaluation due to angulation and positioning and a pubis fracture cannot be excluded..    CXR 2.3 cm confluence of shadows overlying the left upper lobe.  Consider nonurgent follow-up chest with lateral for further evaluation  .  REPEAT CXR 5/17  Chronic lung changes are seen bilaterally.  Focal region of scarring is seen in the left suprahilar location, less prominent as compared to the previous study of 05/11/2018.  No consolidation or pleural effusions.  The heart is normal in size.  Calcified atheromatous disease affects the aorta.  The bones are osteopenic and show age-appropriate degenerative change.    Left elbow x ray There is a fracture involving the radial neck.  Posterior fat pad is present compatible with an effusion.    Left hip x rays repeat  There are fractures involving the left symphysis pubis and the inferior ramus which were limited to evaluation on the prior study.    No evidence of a fracture of the left hip.  No dislocation.  Vascular calcifications overlying the left femoral head and neck.    US lower ext No evidence of deep venous thrombosis in the left lower extremity.    EKG Normal sinus rhythm  Nonspecific T wave abnormality        CRANIAL NERVES     CN III, IV, VI   Pupils are equal, round, and reactive to light.

## 2018-05-28 NOTE — PT/OT/SLP PROGRESS
Occupational Therapy   Treatment    Name: Merari Romero  MRN: [id number]  Admitting Diagnosis:  <principal problem not specified>       Recommendations:     Discharge Recommendations: home health OT  Discharge Equipment Recommendations:  bedside commode (home health to assess for tub bench needs)    Subjective     Communicated with: nursing and physical therapy prior to session.  Pain/Comfort:  · Pain Rating 1:  (8/10 left knee when attempting position changes in bedside chair, except when pt's daughter held left foot in dorsi flexion during position changes then 0/10 pain)    Patients cultural, spiritual, Gnosticist conflicts given the current situation:  (none verbalized)    Objective:     Patient found:  (sitting up in bed side chair)    General Precautions: Standard, fall   Orthopedic Precautions:LUE non weight bearing, LLE weight bearing as tolerated   Braces:  (left UE long arm cast)     Occupational Performance:    Bed Mobility:    · Patient sitting up in bed side chair on OT arrival    Activities of Daily Living:  · UB Dressing: supervision verbal cues  · LB Dressing: not completed today  due to significant left LE pain, will hold LE treatment until ortho sees patient today    Patient left sitting up in bed side chair with all lines intact, call button in reach and spouse and patient's daughter present    Treatment & Education:  Left UE shoulder AROM all available movements to tolerance 2 x 10 reps with good tolerance with patient reporting no pain.  Education:    Assessment:     Merari Romero is a 78 y.o. female with a medical diagnosis of <principal problem not specified>.  She presents with increased left knee pain recently limiting the progress she was making last week.  Performance deficits affecting function are impaired self care skills, weakness, impaired endurance, impaired functional mobilty, decreased upper extremity function, pain.        Plan:     Patient to be seen 3 x/week, 5 x/week to address the  above listed problems via self-care/home management, therapeutic activities, therapeutic exercises  · Plan of Care Expires:  (d/c from facility)  · Plan of Care Reviewed with: patient, spouse, daughter    This Plan of care has been discussed with the patient who was involved in its development and understands and is in agreement with the identified goals and treatment plan    GOALS:    Occupational Therapy Goals        Problem: Occupational Therapy Goal    Goal Priority Disciplines Outcome Interventions   Occupational Therapy Goal     OT, PT/OT Ongoing (interventions implemented as appropriate)    Description:  Goals to be met by: 05/24/2018     Patient will increase functional independence with ADLs by performing:    UE Dressing to be assessed (goal met 5/18/2018)  LE Dressing with Minimal Assistance. (goal partially met with socks)  Toilet transfer to bedside commode with Contact Guard Assistance.    Updated goals 5/21/2018: Will continue to work on goals 2 and 3 above and add  UE dressing with SBA                       Time Tracking:     OT Date of Treatment: 05/28/18  OT Start Time: 1425  OT Stop Time: 1435  OT Total Time (min): 10 min    Billable Minutes:Therapeutic Exercise 10 min, ther act x 5 min    LEYLA Plascencia  5/28/2018

## 2018-05-28 NOTE — NURSING
Called Dr. Devin Ramirez for Ortho consult. No answer. Left Voicemail to contact Lisa/Shell [phone].

## 2018-05-28 NOTE — ASSESSMENT & PLAN NOTE
lovenox 40 daily due to hx of DVT and immobility,    Rechecked left leg US to r/o clot - this was normal.

## 2018-05-28 NOTE — ASSESSMENT & PLAN NOTE
Give small dose toradol today - this didn't help.  Try advil- no relief  Check x rays today and consult ortho  U/s for DVT negative as well as uric acid

## 2018-05-28 NOTE — NURSING
Verified with Janae Daly NP that Yasmin GONZALEZ for [facility] has been notified and will be seeing patient today.

## 2018-05-28 NOTE — PT/OT/SLP PROGRESS
"Physical Therapy Treatment    Patient Name:  Merari Romero   MRN:  [id number]    Recommendations:     Discharge Recommendations:  home health PT, home with home health   Discharge Equipment Recommendations: walker, rolling, walker, flora, wheelchair, bath bench   Barriers to discharge: None    Assessment:     Merari Romero is a 78 y.o. female admitted with a medical diagnosis of <principal problem not specified>.  She presents with the following impairments/functional limitations:  weakness, impaired endurance, impaired self care skills, impaired functional mobilty, gait instability, impaired balance, decreased upper extremity function, decreased lower extremity function, decreased safety awareness, pain Pt pain in LLE limiting mobility progression.    Rehab Prognosis:  Fair; patient would benefit from acute skilled PT services to address these deficits and reach maximum level of function.      Recent Surgery: * No surgery found *      Plan:     During this hospitalization, patient to be seen  (1-2x/day Monday-Friday; PRN Weekends/holidays) to address the above listed problems via gait training, therapeutic activities, therapeutic exercises  · Plan of Care Expires:   (upon D/C from facility)   Plan of Care Reviewed with: patient    Subjective     Communicated with patient prior to session.  Patient found supine in bed upon PT entry to room, agreeable to treatment.      Chief Complaint: "My left leg hurts so bad"  Patient comments/goals: "Go home"  Pain/Comfort:  · Pain Rating 1: 6/10  · Location - Side 1: Left  · Location - Orientation 1: generalized  · Location 1: knee  · Pain Addressed 1: Nurse notified    Patients cultural, spiritual, Jehovah's witness conflicts given the current situation:      Objective:     Patient found with:       General Precautions: Standard, fall   Orthopedic Precautions:LUE non weight bearing, LLE weight bearing as tolerated   Braces: N/A     Pt refused to attempt to get OOB this AM secondary to just " returning to bed to rest secondary to increased LLE pain seated in chair.     AM-PAC 6 CLICK MOBILITY  Turning over in bed (including adjusting bedclothes, sheets and blankets)?: 2  Sitting down on and standing up from a chair with arms (e.g., wheelchair, bedside commode, etc.): 2  Moving from lying on back to sitting on the side of the bed?: 2  Moving to and from a bed to a chair (including a wheelchair)?: 2  Need to walk in hospital room?: 1  Climbing 3-5 steps with a railing?: 1  Total Score: 10       Therapeutic Activities and Exercises:   Pt tolerated A/A exercises on LLE and AROM exercises to RLE at all joints in available planes of motion with rest breaks as needed to increase strength and endurance with all gross mobility skills.     Patient left supine with all lines intact, call button in reach, NSG notified and familoy present..    GOALS:    Physical Therapy Goals        Problem: Physical Therapy Goal    Goal Priority Disciplines Outcome Goal Variances Interventions   Physical Therapy Goal     PT/OT, PT      Description:  Goals to be met by: 2018     Patient will increase functional independence with mobility by performin. Supine to sit with Stand-by Assistance  2. Sit to supine with Stand-by Assistance  3. Rolling to Left and Right with Stand-by Assistance.  4. Sit to stand transfer with Stand-by Assistance, using bronson-walker  5. Bed to chair transfer with Stand-by Assistance using Bronson-walker  6. Gait  x 50 feet with Stand-by Assistance using Bronson-walker.              Problem: Physical Therapy Goal    Goal Priority Disciplines Outcome Goal Variances Interventions   Physical Therapy Goal     PT/OT, PT      Description:  Ongoing efforts  to achieved previously documented goals           Problem: Physical Therapy Goal    Goal Priority Disciplines Outcome Goal Variances Interventions   Physical Therapy Goal     PT/OT, PT Ongoing (interventions implemented as appropriate)     Description:  1. Pt  will demonstrate hemiwalker use for 25 ft - WBAT Left lower extremiity with minimal assistance for safety           Problem: Physical Therapy Goal    Goal Priority Disciplines Outcome Goal Variances Interventions   Physical Therapy Goal     PT/OT, PT      Description:  Ongoing efforts to achieve stated goals of transfers and self help activities                     Time Tracking:     PT Received On: 05/28/18  PT Start Time: 1034     PT Stop Time: 1049  PT Total Time (min): 15 min     Billable Minutes: Therapeutic Exercise 15 minutes    Treatment Type: Treatment  PT/PTA: PT           Paige Ramirez, PT  05/28/2018

## 2018-05-28 NOTE — PROGRESS NOTES
Wound care performed on skin tear to left upper arm. Cleansed with NS; bactroban and telfa applied; wrapped with gauze and ace bandage.

## 2018-05-28 NOTE — ASSESSMENT & PLAN NOTE
US of Left LE - rule out DVT (though unlikely, she had a +homans and calf tenderness).negative. Given NSAIDS without relief. Uric acid negative. Check x rays today and re-consult ortho.  Her C/C is her loose L knee prosthesis (8yrs old)

## 2018-05-29 VITALS
TEMPERATURE: 97 F | WEIGHT: 138.5 LBS | OXYGEN SATURATION: 98 % | SYSTOLIC BLOOD PRESSURE: 150 MMHG | HEIGHT: 63 IN | DIASTOLIC BLOOD PRESSURE: 76 MMHG | BODY MASS INDEX: 24.54 KG/M2 | RESPIRATION RATE: 18 BRPM | HEART RATE: 101 BPM

## 2018-05-29 PROCEDURE — 99900035 HC TECH TIME PER 15 MIN (STAT)

## 2018-05-29 PROCEDURE — G8982 BODY POS GOAL STATUS: HCPCS | Mod: CK

## 2018-05-29 PROCEDURE — 63600175 PHARM REV CODE 636 W HCPCS: Performed by: NURSE PRACTITIONER

## 2018-05-29 PROCEDURE — 99238 HOSP IP/OBS DSCHRG MGMT 30/<: CPT | Mod: ,,, | Performed by: FAMILY MEDICINE

## 2018-05-29 PROCEDURE — 94640 AIRWAY INHALATION TREATMENT: CPT

## 2018-05-29 PROCEDURE — 97530 THERAPEUTIC ACTIVITIES: CPT

## 2018-05-29 PROCEDURE — 25000003 PHARM REV CODE 250: Performed by: NURSE PRACTITIONER

## 2018-05-29 PROCEDURE — 97110 THERAPEUTIC EXERCISES: CPT

## 2018-05-29 PROCEDURE — 94760 N-INVAS EAR/PLS OXIMETRY 1: CPT

## 2018-05-29 PROCEDURE — 25000242 PHARM REV CODE 250 ALT 637 W/ HCPCS: Performed by: NURSE PRACTITIONER

## 2018-05-29 PROCEDURE — G8983 BODY POS D/C STATUS: HCPCS | Mod: CL

## 2018-05-29 RX ORDER — DEXAMETHASONE SODIUM PHOSPHATE 4 MG/ML
2 INJECTION, SOLUTION INTRA-ARTICULAR; INTRALESIONAL; INTRAMUSCULAR; INTRAVENOUS; SOFT TISSUE ONCE
Status: COMPLETED | OUTPATIENT
Start: 2018-05-29 | End: 2018-05-29

## 2018-05-29 RX ORDER — HYDROCODONE BITARTRATE AND ACETAMINOPHEN 5; 325 MG/1; MG/1
1 TABLET ORAL EVERY 4 HOURS PRN
Qty: 20 TABLET | Refills: 0 | Status: SHIPPED | OUTPATIENT
Start: 2018-05-29 | End: 2018-06-14

## 2018-05-29 RX ADMIN — AMLODIPINE BESYLATE 5 MG: 5 TABLET ORAL at 07:05

## 2018-05-29 RX ADMIN — HYDROCODONE BITARTRATE AND ACETAMINOPHEN 1 TABLET: 5; 325 TABLET ORAL at 02:05

## 2018-05-29 RX ADMIN — PANTOPRAZOLE SODIUM 40 MG: 40 TABLET, DELAYED RELEASE ORAL at 07:05

## 2018-05-29 RX ADMIN — DEXAMETHASONE SODIUM PHOSPHATE 4 MG: 4 INJECTION, SOLUTION INTRAMUSCULAR; INTRAVENOUS at 07:05

## 2018-05-29 RX ADMIN — IPRATROPIUM BROMIDE AND ALBUTEROL SULFATE 3 ML: 2.5; .5 SOLUTION RESPIRATORY (INHALATION) at 07:05

## 2018-05-29 RX ADMIN — ATORVASTATIN CALCIUM 20 MG: 20 TABLET, FILM COATED ORAL at 07:05

## 2018-05-29 RX ADMIN — DULOXETINE 30 MG: 30 CAPSULE, DELAYED RELEASE ORAL at 07:05

## 2018-05-29 RX ADMIN — DOCUSATE SODIUM 100 MG: 100 CAPSULE, LIQUID FILLED ORAL at 07:05

## 2018-05-29 RX ADMIN — HYDROCODONE BITARTRATE AND ACETAMINOPHEN 1 TABLET: 5; 325 TABLET ORAL at 10:05

## 2018-05-29 RX ADMIN — DEXAMETHASONE SODIUM PHOSPHATE 6 MG: 4 INJECTION, SOLUTION INTRAMUSCULAR; INTRAVENOUS at 01:05

## 2018-05-29 RX ADMIN — DEXAMETHASONE SODIUM PHOSPHATE 2 MG: 4 INJECTION, SOLUTION INTRAMUSCULAR; INTRAVENOUS at 12:05

## 2018-05-29 RX ADMIN — METOPROLOL SUCCINATE 50 MG: 50 TABLET, EXTENDED RELEASE ORAL at 07:05

## 2018-05-29 NOTE — ASSESSMENT & PLAN NOTE
Give small dose toradol today - this didn't help.  Try advil- no relief  Check x rays today and consult ortho  U/s for DVT negative as well as uric acid  This was re evaluated and thought to be radiated pain from back and lumbar erays support that thought. Decadron helped, ortho here to re eval this am

## 2018-05-29 NOTE — PLAN OF CARE
05/29/18 1400   Discharge Assessment   Assessment Type Discharge Planning Reassessment     Choice paper signed indicating [facility] as patient choice for Home Health.

## 2018-05-29 NOTE — PLAN OF CARE
Problem: Patient Care Overview  Goal: Plan of Care Review  Outcome: Ongoing (interventions implemented as appropriate)  Plan of care reviewed with patient and she agrees with the plan of care. Hydrocodone effective for c/o left hip pain. No acute distress noted.     Problem: Fall Risk (Adult)  Goal: Absence of Falls  Patient will demonstrate the desired outcomes by discharge/transition of care.   Outcome: Ongoing (interventions implemented as appropriate)  Fall precautions maintained. Bed alarm engaged at all times. Family at bedside.

## 2018-05-29 NOTE — PROGRESS NOTES
[facility]  Progress Note    Patient Name: Merari Romero  MRN: [id number]  Patient Class: IP- Swing   Admission Date: 5/17/2018  Length of Stay: 12 days  Attending Physician: Jared Bond MD  Primary Care Provider: Shaun Barraza MD        Subjective:     Principal Problem:<principal problem not specified>    HPI:  5/18/18 SWING ADMIT  78 year old female admitted 5/11/18 for Closed nondisplaced fracture of neck of left radius after a falll going up stairs, slipped on first step.. She was admitted for pain control and PT. She is progressing with PT but slow. Did not qualify for inpatient rehab as can not tolerate 3hr of activity.    SWING placement yesterday for continued PT/OT . She was living at home with  prior to fall and able to take care of self and him.  Patient had a restful night. VS stable. Some  C/o left sided pelvic pain, relieved with hydrocodone x2 and morphine x1 for breakthrough pain. Ace wrap and sling to left arm intact. She reports she tolerated PT well yesterday.  Encouraged continue IS.    and daughter at bedside, supportive. Pt. amb. 10' with hemiwalker, mod. A. No c/o SOB or fevers this morning.    Hospital Course:  5/21/18  She is doing well. Skin tears healing well. She is ambulating 20 ft with PT using rolling walker. Her goal is 50 ft. She has only used norco PO over last 24hr to control her pain. She remains on RA with POX 93-96%. Has IS at bedside. Using PRN. No fever. On lovonox SQ for DVT prophylaxis. Daughter at bedside reports that they would like to take her home rather than send her to INPT rehab if she is strong enough by D/c    5/23/18  Yesterday did well with therapy. Using stand by assist she was able to do all bed mobility, and used contact guard assist to transfer from sit to stand and ambulated with the same assist using flora walker 15 ft. This is better than the weekend where she was still requiring min assist per PT.  She is progressing towards her goal of stand by assist 50ft using flora walker.     She has not required morphine for breakthrough pain since the 5/21/18. Using norco PRN for pain.     VSS/afebrile, maintaining sats on RA, IS at bedside and nebs BID. lovenox daily     C/o left foot pain today. She reports that the the top of foot to her knee. Has hx of arthritis. Just started yesterday Pain meds help the pain in foot as well as hip  5/24/18 Brief MD; pt with c/o persistent Left LE pain; tender and hurts with movement; also notes left hip pain with rotation. She cannot dorsiflex without pain and it is limiting her ability to ambulate/participate in PT  5/25/18 Pt still with c/o left LE pain; US negative for DVT; symptoms sound consistent with plantar/ dorsal fascitis. Pain under heel and to top of foot; worse when she just starts to walk after getting out of bed  One toe with slight redness. Notes hx of neuropathy but stopped taking her meds.   She does have pain to left hip with rotation but has + fractures involving the left symphysis pubis and the inferior ramus.     5/28/18 She has been on SWING for 11 days. She was doing well then started with left ankle and foot pain. She is no longer ambulating with PT due to left foot and ankle pain. U/s completed negative for DVT. Uric acid 4.6. She was given NSAID without relief. Dr Ramirez on call today for ortho. Nurse left voice mail with him for eval of patients foot/ankle pain hindering her rehab. X rays ordered. She also is back to using IV pain meds when she had weaned to po only    5/29/18. She was seen by ortho again yesterday for her complaints of left leg pain. X rays without acute issues. She has hx of back issues. Lumbar x rays do show grade 1 anterolisthesis of L5-S1. She reports that she feels better this am after receiving decadron over night. She is willing to try and walk with PT today.   If successful, she will be discharged home.    Interval History: see  HC     Review of Systems   Constitutional: Negative for chills and fever.   Respiratory: Negative for shortness of breath.    Cardiovascular: Negative for chest pain and palpitations.   Gastrointestinal: Negative for abdominal pain, blood in stool, constipation and nausea.   Genitourinary: Negative for difficulty urinating and pelvic pain.   Musculoskeletal: Positive for arthralgias (left ankle), back pain and myalgias.        Left hip pain with moving as well as left arm pain with moving   Skin:        Left middle toe bruised   Psychiatric/Behavioral: Negative for dysphoric mood, sleep disturbance and suicidal ideas. The patient is not nervous/anxious.      Objective:     Vital Signs (Most Recent):  Temp: 96.9 °F (36.1 °C) (05/29/18 0720)  Pulse: 80 (05/29/18 0743)  Resp: 16 (05/29/18 0743)  BP: (!) 141/73 (05/29/18 0720)  SpO2: 98 % (05/29/18 0743) Vital Signs (24h Range):  Temp:  [96.1 °F (35.6 °C)-97.4 °F (36.3 °C)] 96.9 °F (36.1 °C)  Pulse:  [73-91] 80  Resp:  [16-18] 16  SpO2:  [93 %-98 %] 98 %  BP: (111-141)/(60-73) 141/73     Weight: 62.8 kg (138 lb 8 oz)  Body mass index is 24.53 kg/m².    Intake/Output Summary (Last 24 hours) at 05/29/18 0811  Last data filed at 05/28/18 1755   Gross per 24 hour   Intake              880 ml   Output                0 ml   Net              880 ml      Physical Exam   Constitutional: She is oriented to person, place, and time. She appears well-developed. No distress.   HENT:   Head: Normocephalic and atraumatic.   Eyes: Conjunctivae are normal. Pupils are equal, round, and reactive to light.   Neck: Normal range of motion. Neck supple. No thyromegaly present.   Cardiovascular: Normal rate, regular rhythm, normal heart sounds and intact distal pulses.    Pulmonary/Chest: Effort normal and breath sounds normal. No respiratory distress. She has no wheezes.   Abdominal: Soft. Bowel sounds are normal. There is no tenderness.   Musculoskeletal: She exhibits tenderness (hip  bilaterally. + left with rotation). She exhibits no edema.   Left sling in place    Left foot with MTP arthritic changes     +Homans   Lymphadenopathy:     She has no cervical adenopathy.   Neurological: She is alert and oriented to person, place, and time.   Skin: Skin is warm and dry. No rash noted.   Psychiatric: She has a normal mood and affect. Her behavior is normal.   Nursing note and vitals reviewed.      Significant Labs:   Lab Results   Component Value Date    WBC 8.28 05/27/2018    HGB 12.5 05/27/2018    HCT 38.6 05/27/2018    MCV 90 05/27/2018     (H) 05/27/2018     Uric acid 4.6    Significant Imaging:     Left hip x ray No definite evidence of a displaced fracture or dislocation.    The symphysis pubis is limited to evaluation due to angulation and positioning and a pubis fracture cannot be excluded..    CXR 2.3 cm confluence of shadows overlying the left upper lobe.  Consider nonurgent follow-up chest with lateral for further evaluation  .  REPEAT CXR 5/17  Chronic lung changes are seen bilaterally.  Focal region of scarring is seen in the left suprahilar location, less prominent as compared to the previous study of 05/11/2018.  No consolidation or pleural effusions.  The heart is normal in size.  Calcified atheromatous disease affects the aorta.  The bones are osteopenic and show age-appropriate degenerative change.    Left elbow x ray There is a fracture involving the radial neck.  Posterior fat pad is present compatible with an effusion.    Repeat left elbow Images are obtained through casting material at nearly 90° flexion.  There is a slightly impacted healing radial neck fracture with alignment similar to the May 11th 2018 exam.    Left hip x rays repeat  There are fractures involving the left symphysis pubis and the inferior ramus which were limited to evaluation on the prior study.    No evidence of a fracture of the left hip.  No dislocation.  Vascular calcifications overlying the left  femoral head and neck.    Left knee There is generalized osteopenia.  There are stable postoperative changes from a total left knee arthroplasty.  No acute fractures or dislocations or joint effusions.  The    There is vascular calcification involving the popliteal artery and the trifurcation vessels.    Left foot Degenerative changes involving the 1st metatarsophalangeal joint with hallux valgus deformity.  Degenerative change of the interphalangeal joints and the midfoot at the tarsal metatarsal joint spaces.    There is no evidence of fracture, dislocation or other acute osseous abnormality. No focal soft tissue abnormality.    Left ankle There is no evidence of fracture, dislocation or other acute osseous abnormality. No focal soft tissue abnormality.  Vascular calcifications.    US lower ext No evidence of deep venous thrombosis in the left lower extremity.    lumbar spine x ray There is a levoscoliosis in the lower lumbar region.  There is follow-up prominent demineralization.  There is a grade 1 anterolisthesis of L5 upon S1.  Prominent lower lumbar degenerative facet changes are present.  There is diffuse demineralization.      EKG Normal sinus rhythm  Nonspecific T wave abnormality        CRANIAL NERVES     CN III, IV, VI   Pupils are equal, round, and reactive to light.        Assessment/Plan:      Pain in left lower leg    radiculopathy from lower back  US of Left LE - rule out DVT (though unlikely, she had a +homans and calf tenderness).negative. Given NSAIDS without relief. Uric acid negative. Check x rays today and re-consult ortho.  Lumbar x rays and steroids        Left hip pain    Treat pain - known fracture. Can bear weight.  With worsening pain, may consider repeat imaging if she can't bear weight on hip, though.    Repeat image today to r/o occult hip fracture looks like acetabular fracture, getting CT to further eval. May need extension of SWING days for further therapy          Inadequate  dietary energy intake              Debility    Continue PT/OT  IS encouraged.         Fall    Fear of falling. Cont with PT/OT          Urinary hesitancy    U/ a and culture- all normal  Better now that she can get up to BEDS SIDE commode          History of deep venous thrombosis    lovenox 40 daily due to hx of DVT and immobility,    Rechecked left leg US to r/o clot - this was normal.        Closed fracture of left pubis    Non operative   Pain control try and cont po as much as poss  Try and reduce risk of associated complications of limited mobility(IS, nebs, PT)  Discussed with [facility] ortho. Seen patient 5/16, cont non wt bearing to left upper ext with long arm splint and wt bearing as tolerated to left lower ext. F/u Dr Lemos 3 weeks from fall  for repeat films  Cont PT till safe for D/C to home        Closed nondisplaced fracture of neck of left radius    Ortho consulted via ER, non surgical orthopedic injuries per Dr Doc Wolf, non wt bearing injury to left upper ext Splinted   She also can wt bear as tolerated to left lower ext   Bo Urbina NP with ortho came 5/16, no changes, f/u Aminta 3 weeks for repeat films  Undressed splint 5/21 to assess skin tears, healing well, no s/s infection. Will undress biweekly and apply bactroban  5/25/18 Stable- doing well  5/29/2018 x rays arm show healing        Hyperlipidemia LDL goal <70    Cont lipitor        Lumbar radiculopathy    Give small dose toradol today - this didn't help.  Try advil- no relief  Check x rays today and consult ortho  U/s for DVT negative as well as uric acid  This was re evaluated and thought to be radiated pain from back and lumbar erays support that thought. Decadron helped, ortho here to re eval this am          Neuropathy of lower extremity    Cont cymbalta and amitriptyline may consider lyrica at home          HTN (hypertension)    /70  Well controlled on amlodipine and toprol          VTE Risk Mitigation         Ordered      enoxaparin injection 40 mg  Daily      05/17/18 1159     IP VTE HIGH RISK PATIENT  Once      05/17/18 1158     Place sequential compression device  Until discontinued      05/17/18 1158              Jared Bond MD  Department of [facility] Medicine   [facility]

## 2018-05-29 NOTE — PROGRESS NOTES
[facility]  Orthopedics  Progress Note    Patient Name: Merari Romero  MRN: [id number]  Admission Date: 5/17/2018  Code Status: Full Code   Attending Provider: Jared Bond MD   Consulting Provider: Lenny Urbina NP  Primary Care Physician: Shaun Barraza MD  Principal Problem:<principal problem not specified>      Subjective:     Chief Complaint:  Patient with history of fall 5/11/18. Left pubic ramus fracture and left radial head fracture. Recently declined in ability to walk. Undergoing Decadron taper IV now with improved symptoms.       Past Medical History:   Diagnosis Date    DVT (deep venous thrombosis)     left leg    Hypertension     Neuropathy     Osteoporosis      Past Surgical History:   Procedure Laterality Date    FEMUR SURGERY      repair - left leg    HYSTERECTOMY  1970's    JOINT REPLACEMENT      left knee    NASAL FRACTURE SURGERY       Family History     Problem Relation (Age of Onset)    Heart disease Mother, Father        Social History Main Topics    Smoking status: Former Smoker     Years: 40.00     Types: Cigarettes     Quit date: 8/9/2000    Smokeless tobacco: Never Used    Alcohol use No    Drug use: No    Sexual activity: Yes     Partners: Male     Birth control/ protection: Surgical     Social History   Substance Use Topics    Smoking status: Former Smoker     Years: 40.00     Types: Cigarettes     Quit date: 8/9/2000    Smokeless tobacco: Never Used    Alcohol use No       Review of patient's allergies indicates:   Allergen Reactions    No known drug allergies        Current Inpatient Meds:    Current Facility-Administered Medications:     acetaminophen tablet 650 mg, 650 mg, Oral, Q8H PRN, Janae Daly NP, 650 mg at 05/25/18 1047    albuterol-ipratropium 2.5 mg-0.5 mg/3 mL nebulizer solution 3 mL, 3 mL, Nebulization, Q12H, Janae Daly NP, 3 mL at 05/29/18 0743    amitriptyline tablet 25 mg, 25 mg, Oral, QHS, Janae Daly NP,  25 mg at 05/28/18 2107    amLODIPine tablet 5 mg, 5 mg, Oral, Daily, Janae Daly, CARLOS, 5 mg at 05/29/18 0722    atorvastatin tablet 20 mg, 20 mg, Oral, Daily, Janae Daly, NP, 20 mg at 05/29/18 0722    docusate sodium capsule 100 mg, 100 mg, Oral, BID, Janae Daly, CARLOS, 100 mg at 05/29/18 0721    DULoxetine DR capsule 30 mg, 30 mg, Oral, Daily, Janae Daly, NP, 30 mg at 05/29/18 0722    enoxaparin injection 40 mg, 40 mg, Subcutaneous, Daily, Janae Daly NP, 40 mg at 05/28/18 1739    GI cocktail (mylanta 30 mL, lidocaine 2 % viscous 10 mL, dicyclomine 10 mL) 50 mL, , Oral, BID PRN, Lance Tabor MD    GI cocktail (mylanta 30 mL, lidocaine 2 % viscous 10 mL, dicyclomine 10 mL) 50 mL, , Oral, BID PRN, Lance Tabor MD    HYDROcodone-acetaminophen 5-325 mg per tablet 1 tablet, 1 tablet, Oral, Q4H PRN, Janae Daly NP, 1 tablet at 05/28/18 2107    metoprolol succinate (TOPROL-XL) 24 hr tablet 50 mg, 50 mg, Oral, Daily, Janae Daly, CARLOS, 50 mg at 05/29/18 0722    morphine injection 2 mg, 2 mg, Intravenous, Q4H PRN, Janae Daly NP, 2 mg at 05/27/18 1416    mupirocin 2 % ointment, , Topical (Top), Daily, Janae Daly NP    ondansetron injection 4 mg, 4 mg, Intravenous, Q8H PRN, Janae Daly NP, 4 mg at 05/27/18 0012    pantoprazole EC tablet 40 mg, 40 mg, Oral, Daily, Janae Daly, NP, 40 mg at 05/29/18 0721    promethazine (PHENERGAN) 12.5 mg in dextrose 5 % 50 mL IVPB, 12.5 mg, Intravenous, Q6H PRN, Janae Daly NP, Last Rate: 150 mL/hr at 05/27/18 0123, 12.5 mg at 05/27/18 0123    sodium chloride 0.9% flush 10 mL, 10 mL, Intravenous, BID, Michael Chacon MD, 10 mL at 05/28/18 2109    zolpidem tablet 5 mg, 5 mg, Oral, Nightly PRN, Lance Tabor MD, 5 mg at 05/26/18 9010    Review of Systems:  Constitutional: no fever or chills  Respiratory: no cough or shorness of breath  Cardiovascular: no chest pain or  palpitations  Gastrointestinal: no vomiting  Musculoskeletal: admits to improved pain in left lower leg        Objective:     Vital Signs (Most Recent):  Temp: 96.9 °F (36.1 °C) (05/29/18 0720)  Pulse: 80 (05/29/18 0743)  Resp: 16 (05/29/18 0743)  BP: (!) 141/73 (05/29/18 0720)  SpO2: 98 % (05/29/18 0743) Vital Signs (24h Range):  Temp:  [96.1 °F (35.6 °C)-97.4 °F (36.3 °C)] 96.9 °F (36.1 °C)  Pulse:  [73-91] 80  Resp:  [16-18] 16  SpO2:  [93 %-98 %] 98 %  BP: (111-141)/(60-73) 141/73     Weight: 62.8 kg (138 lb 8 oz)      SpO2: 98 %  O2 Device (Oxygen Therapy): room air        Physical Exam:  General: mild distress secondary to surgical pain  Lungs: normal respiratory effort   Pulses: all distal pulses normal   Extremities: patient was able to get out of bed with my assistance and stand with the help of a flora-walker. She took 4 steps independently. Strength and ability to bear weight while standing greatly improved since yesterday. Still exhibiting difficulty and weakness when flexing left hip. NVI bilateral feet.      Labs:   Recent Lab Results       05/27/18  0923      Baso # 0.03     Basophil% 0.4     Differential Method Automated     Eos # 0.1     Eosinophil% 1.1     Gran # (ANC) 6.1     Gran% 73.0     Hematocrit 38.6     Hemoglobin 12.5     Lymph # 1.6     Lymph% 19.7     MCH 29.1     MCHC 32.4     MCV 90     Mono # 0.5     Mono% 5.8     MPV 9.6     Platelets 455(H)     RBC 4.29     RDW 14.5     WBC 8.28              Imaging:   Imaging Results    None       Assessment and Plan:     Active Diagnoses:    Diagnosis Date Noted POA    Left hip pain [M25.552] 05/24/2018 No    Pain in left lower leg [M79.662] 05/24/2018 No    Debility [R53.81] 05/18/2018 Yes    Inadequate dietary energy intake [E63.9] 05/18/2018 Yes    Fall [W19.XXXA] 05/17/2018 Yes    Urinary hesitancy [R39.11] 05/14/2018 Yes    Closed nondisplaced fracture of neck of left radius [S52.135A] 05/11/2018 Yes    Closed fracture of left pubis  [S32.502A] 05/11/2018 Yes    History of deep venous thrombosis [Z86.718] 05/11/2018 Not Applicable    Hyperlipidemia LDL goal <70 [E78.5] 01/04/2018 Yes    Lumbar radiculopathy [M54.16] 04/01/2015 Yes    HTN (hypertension) [I10] 08/09/2012 Yes    Neuropathy of lower extremity [G57.90] 08/09/2012 Yes      Problems Resolved During this Admission:    Diagnosis Date Noted Date Resolved POA       VTE Risk Mitigation         Ordered     enoxaparin injection 40 mg  Daily      05/17/18 1159     IP VTE HIGH RISK PATIENT  Once      05/17/18 1158     Place sequential compression device  Until discontinued      05/17/18 1158        PLAN:  XR left hip  Remove splint left arm and begin ROM-remain NWB LUE  OOB with PT- WBAT  If Xray left hip negative, patient may be discharged home to follow-up with Dr. Hudson in [place] clinic next Wednesday      Lenny Urbina NP  Orthopedics  [facility]

## 2018-05-29 NOTE — ASSESSMENT & PLAN NOTE
Treat pain - known fracture. Can bear weight.  With worsening pain, may consider repeat imaging if she can't bear weight on hip, though.    Repeat image today to r/o occult hip fracture,did have fracture into acetabular area. Non displaced, can wt bear and cont with PT, d/c with home health PT/OT

## 2018-05-29 NOTE — DISCHARGE SUMMARY
[facility] Hospital Medicine  Discharge Summary      Patient Name: Merari Romero  MRN: [id number]  Admission Date: 5/17/2018  Hospital Length of Stay: 12 days  Discharge Date and Time:  05/29/2018 2:38 PM  Attending Physician: Jared Bond MD   Discharging Provider: Janae Daly NP  Primary Care Provider: Shaun Barraza MD      HPI:   5/18/18 SWING ADMIT  78 year old female admitted 5/11/18 for Closed nondisplaced fracture of neck of left radius after a falll going up stairs, slipped on first step.. She was admitted for pain control and PT. She is progressing with PT but slow. Did not qualify for inpatient rehab as can not tolerate 3hr of activity.    SWING placement yesterday for continued PT/OT . She was living at home with  prior to fall and able to take care of self and him.  Patient had a restful night. VS stable. Some  C/o left sided pelvic pain, relieved with hydrocodone x2 and morphine x1 for breakthrough pain. Ace wrap and sling to left arm intact. She reports she tolerated PT well yesterday.  Encouraged continue IS.    and daughter at bedside, supportive. Pt. amb. 10' with hemiwalker, mod. A. No c/o SOB or fevers this morning.    * No surgery found *      Hospital Course:   5/21/18  She is doing well. Skin tears healing well. She is ambulating 20 ft with PT using rolling walker. Her goal is 50 ft. She has only used norco PO over last 24hr to control her pain. She remains on RA with POX 93-96%. Has IS at bedside. Using PRN. No fever. On lovonox SQ for DVT prophylaxis. Daughter at bedside reports that they would like to take her home rather than send her to INPT rehab if she is strong enough by D/c    5/23/18  Yesterday did well with therapy. Using stand by assist she was able to do all bed mobility, and used contact guard assist to transfer from sit to stand and ambulated with the same assist using flora walker 15 ft. This is better than the weekend where she was  still requiring min assist per PT. She is progressing towards her goal of stand by assist 50ft using flora walker.     She has not required morphine for breakthrough pain since the 5/21/18. Using norco PRN for pain.     VSS/afebrile, maintaining sats on RA, IS at bedside and nebs BID. lovenox daily     C/o left foot pain today. She reports that the the top of foot to her knee. Has hx of arthritis. Just started yesterday Pain meds help the pain in foot as well as hip  5/24/18 Brief MD; pt with c/o persistent Left LE pain; tender and hurts with movement; also notes left hip pain with rotation. She cannot dorsiflex without pain and it is limiting her ability to ambulate/participate in PT  5/25/18 Pt still with c/o left LE pain; US negative for DVT; symptoms sound consistent with plantar/ dorsal fascitis. Pain under heel and to top of foot; worse when she just starts to walk after getting out of bed  One toe with slight redness. Notes hx of neuropathy but stopped taking her meds.   She does have pain to left hip with rotation but has + fractures involving the left symphysis pubis and the inferior ramus.     5/28/18 She has been on SWING for 11 days. She was doing well then started with left ankle and foot pain. She is no longer ambulating with PT due to left foot and ankle pain. U/s completed negative for DVT. Uric acid 4.6. She was given NSAID without relief. Dr Ramirez on call today for ortho. Nurse left voice mail with him for eval of patients foot/ankle pain hindering her rehab. X rays ordered. She also is back to using IV pain meds when she had weaned to po only    5/29/18. She was seen by ortho again yesterday for her complaints of left leg pain. X rays without acute issues. She has hx of back issues. Lumbar x rays do show grade 1 anterolisthesis of L5-S1. She reports that she feels better this am after receiving decadron over night. She is willing to try and walk with PT today.   If successful, she will be  discharged home.     Consults:   Consults         Status Ordering Provider     Inpatient consult to Orthopedics  Once     Provider:  (Not yet assigned)    Acknowledged RAFAEL DAMIAN     IP consult to case management  Once     Provider:  (Not yet assigned)    Completed KENIA CONTE          * Closed fracture of left pubis    Non operative   Pain control try and cont po as much as poss  Try and reduce risk of associated complications of limited mobility(IS, nebs, PT)  Discussed with Bo GONZALEZ ortho. Seen patient 5/16, cont non wt bearing to left upper ext with long arm splint and wt bearing as tolerated to left lower ext. F/u Dr Lemos 3 weeks from fall  for repeat films  Cont PT with home health        Pain in left lower leg    radiculopathy from lower back  US of Left LE - rule out DVT (though unlikely, she had a +homans and calf tenderness).negative. Given NSAIDS without relief. Uric acid negative. Check x rays today and re-consult ortho.  Lumbar x rays and steroids, repeat hip shows occult fracture, non displaced, cont same treatment        Left hip pain    Treat pain - known fracture. Can bear weight.  With worsening pain, may consider repeat imaging if she can't bear weight on hip, though.    Repeat image today to r/o occult hip fracture,did have fracture into acetabular area. Non displaced, can wt bear and cont with PT, d/c with home health PT/OT          Inadequate dietary energy intake              Debility    Continue PT/OT with home health  IS encouraged.         Fall    Fear of falling. Cont with PT/OT          Urinary hesitancy    U/ a and culture- all normal  Better now that she can get up to BEDS SIDE commode          History of deep venous thrombosis    lovenox 40 daily due to hx of DVT and immobility, restart pletal at home     Rechecked left leg US to r/o clot - this was normal.        Closed nondisplaced fracture of neck of left radius    Ortho consulted via ER, non surgical orthopedic  injuries per Dr Doc Wolf, non wt bearing injury to left upper ext Splinted   She also can wt bear as tolerated to left lower ext   Bo Urbina NP with ortho came 5/16, no changes, f/u Aminta 3 weeks for repeat films  Undressed splint 5/21 to assess skin tears, healing well, no s/s infection. Will undress biweekly and apply bactroban  5/25/18 Stable- doing well  5/29/2018 x rays arm show healing can progress to platform walker        Hyperlipidemia LDL goal <70    Cont lipitor        Lumbar radiculopathy    Give small dose toradol today - this didn't help.  Try advil- no relief  Check x rays today and consult ortho  U/s for DVT negative as well as uric acid  This was re evaluated and thought to be radiated pain from back and lumbar erays support that thought. Decadron helped, ortho here to re eval this am, new hip fracture noted, no changes to POC          Neuropathy of lower extremity    Cont cymbalta and amitriptyline may consider lyrica at home          HTN (hypertension)    /70  Well controlled on amlodipine and toprol          Final Active Diagnoses:    Diagnosis Date Noted POA    PRINCIPAL PROBLEM:  Closed fracture of left pubis [S32.502A] 05/11/2018 Yes    Left hip pain [M25.552] 05/24/2018 No    Pain in left lower leg [M79.662] 05/24/2018 No    Debility [R53.81] 05/18/2018 Yes    Inadequate dietary energy intake [E63.9] 05/18/2018 Yes    Fall [W19.XXXA] 05/17/2018 Yes    Urinary hesitancy [R39.11] 05/14/2018 Yes    Closed nondisplaced fracture of neck of left radius [S52.135A] 05/11/2018 Yes    History of deep venous thrombosis [Z86.718] 05/11/2018 Not Applicable    Hyperlipidemia LDL goal <70 [E78.5] 01/04/2018 Yes    Lumbar radiculopathy [M54.16] 04/01/2015 Yes    HTN (hypertension) [I10] 08/09/2012 Yes    Neuropathy of lower extremity [G57.90] 08/09/2012 Yes      Problems Resolved During this Admission:    Diagnosis Date Noted Date Resolved POA       Discharged Condition:  "good    Disposition: Home or Self Care    Follow Up:  Follow-up Information     Shaun Barraza MD In 1 week.    Specialty:  Family Medicine  Why:  outpatient services  Contact information:  [address] [facility]  [place] [id number] [phone]             Daniel Hudson MD In 1 week.    Specialty:  Orthopedic Surgery  Why:  follow-up in [facility]- 6/6 3:20  Contact information:  [address] [facility]  [place] [address]  [phone]                 Patient Instructions:     WALKER FOR HOME USE   Order Specific Question Answer Comments   Type of Walker: Bronson    With wheels? No    Height: 5' 3" (1.6 m)    Weight: 64 kg (141 lb 1.5 oz) No New\   Length of need (1-99 months): 99    Please check all that apply: Walker will be used for gait training.    Please check all that apply: Patient needs help to get in and out of chair.    Please check all that apply: Patient's condition impairs ambulation.      WALKER FOR HOME USE   Order Specific Question Answer Comments   Type of Walker: Adult (5'4"-6'6") platform   With wheels? Yes    Height: 5' 3" (1.6 m)    Weight: 62.8 kg (138 lb 8 oz)    Length of need (1-99 months): 99    Does patient have medical equipment at home? none    Please check all that apply: Patient's condition impairs ambulation.    Please check all that apply: Patient is unable to safely ambulate without equipment.    Please check all that apply: Walker will be used for gait training.      Ambulatory referral to Home Health   Referral Priority: Routine Referral Type: Home Health   Referral Reason: Specialty Services Required    Requested Specialty: Home Health Services    Number of Visits Requested: 1      Ambulatory referral to Home Health   Referral Priority: Routine Referral Type: Home Health   Referral Reason: Specialty Services Required    Requested Specialty: Home Health Services    Number of Visits Requested: 1          Significant Diagnostic Studies: "   .  Significant Labs:         Lab Results   Component Value Date     WBC 8.28 05/27/2018     HGB 12.5 05/27/2018     HCT 38.6 05/27/2018     MCV 90 05/27/2018      (H) 05/27/2018      Uric acid 4.6     Significant Imaging:      Left hip x ray No definite evidence of a displaced fracture or dislocation.    The symphysis pubis is limited to evaluation due to angulation and positioning and a pubis fracture cannot be excluded..     CXR 2.3 cm confluence of shadows overlying the left upper lobe.  Consider nonurgent follow-up chest with lateral for further evaluation  .  REPEAT CXR 5/17  Chronic lung changes are seen bilaterally.  Focal region of scarring is seen in the left suprahilar location, less prominent as compared to the previous study of 05/11/2018.  No consolidation or pleural effusions.  The heart is normal in size.  Calcified atheromatous disease affects the aorta.  The bones are osteopenic and show age-appropriate degenerative change.     Left elbow x ray There is a fracture involving the radial neck.  Posterior fat pad is present compatible with an effusion.     Repeat left elbow Images are obtained through casting material at nearly 90° flexion.  There is a slightly impacted healing radial neck fracture with alignment similar to the May 11th 2018 exam.     Left hip x rays repeat  There are fractures involving the left symphysis pubis and the inferior ramus which were limited to evaluation on the prior study.    No evidence of a fracture of the left hip.  No dislocation.  Vascular calcifications overlying the left femoral head and neck.     Left knee There is generalized osteopenia.  There are stable postoperative changes from a total left knee arthroplasty.  No acute fractures or dislocations or joint effusions.  The    There is vascular calcification involving the popliteal artery and the trifurcation vessels.     Left foot Degenerative changes involving the 1st metatarsophalangeal joint with  hallux valgus deformity.  Degenerative change of the interphalangeal joints and the midfoot at the tarsal metatarsal joint spaces.    There is no evidence of fracture, dislocation or other acute osseous abnormality. No focal soft tissue abnormality.     Left ankle There is no evidence of fracture, dislocation or other acute osseous abnormality. No focal soft tissue abnormality.  Vascular calcifications.     US lower ext No evidence of deep venous thrombosis in the left lower extremity.     lumbar spine x ray There is a levoscoliosis in the lower lumbar region.  There is follow-up prominent demineralization.  There is a grade 1 anterolisthesis of L5 upon S1.  Prominent lower lumbar degenerative facet changes are present.  There is diffuse demineralization.    Ct pelvis repeated 5/29    There is a subacute fracture of the left inferior pubic ramus and the high superior left pubic body that extends into the anterior and medial acetabular wall.  There is callus formation about these fracture sites.  Additionally, there is an acute/subacute fracture of the left sacral ala and left superior pubic bones/symphysis without evidence for callus formation suggesting this represent more acute fracture.        EKG Normal sinus rhythm  Nonspecific T wave abnormality            Pending Diagnostic Studies:     None         Medications:  Reconciled Home Medications:      Medication List      START taking these medications    HYDROcodone-acetaminophen 5-325 mg per tablet  Commonly known as:  NORCO  Take 1 tablet by mouth every 4 (four) hours as needed.  Replaces:  HYDROcodone-acetaminophen 7.5-325 mg per tablet        CONTINUE taking these medications    amitriptyline 25 MG tablet  Commonly known as:  ELAVIL  Take 25 mg by mouth every evening.     amLODIPine 5 MG tablet  Commonly known as:  NORVASC  take 1 tablet by mouth once daily     atorvastatin 20 MG tablet  Commonly known as:  LIPITOR  Take 20 mg by mouth once daily.      cilostazol 50 MG Tab  Commonly known as:  PLETAL  Take 50 mg by mouth 2 (two) times daily.     DULoxetine 30 MG capsule  Commonly known as:  CYMBALTA  Take 1 capsule (30 mg total) by mouth once daily.     fexofenadine 60 MG tablet  Commonly known as:  ALLEGRA  Take 1 tablet (60 mg total) by mouth once daily.     metoprolol succinate 50 MG 24 hr tablet  Commonly known as:  TOPROL-XL     omeprazole 40 MG capsule  Commonly known as:  PRILOSEC     ranitidine 300 MG tablet  Commonly known as:  ZANTAC        STOP taking these medications    HYDROcodone-acetaminophen 7.5-325 mg per tablet  Commonly known as:  NORCO  Replaced by:  HYDROcodone-acetaminophen 5-325 mg per tablet     ipratropium 0.02 % nebulizer solution  Commonly known as:  ATROVENT     ketoconazole 2 % cream  Commonly known as:  NIZORAL     meclizine 25 mg tablet  Commonly known as:  ANTIVERT            Indwelling Lines/Drains at time of discharge:   Lines/Drains/Airways          No matching active lines, drains, or airways          Time spent on the discharge of patient: 30 minutes  Patient was seen and examined on the date of discharge and determined to be suitable for discharge.         Janae Daly NP  Department of Hospital Medicine  [facility]

## 2018-05-29 NOTE — PT/OT/SLP DISCHARGE
Occupational Therapy Discharge Summary    Merari Romero  MRN: [id number]   Principal Problem: Closed fracture of left pubis      Patient Discharged from acute Occupational Therapy on 05/29/2018.  Please refer to prior OT note dated 05/29/2018 for functional status.    Assessment:      Goals partially met. Patient appropriate for care in another setting.    Objective:     GOALS:    Occupational Therapy Goals        Problem: Occupational Therapy Goal    Goal Priority Disciplines Outcome Interventions   Occupational Therapy Goal     OT, PT/OT Ongoing (interventions implemented as appropriate)    Description:  Goals to be met by: 05/24/2018     Patient will increase functional independence with ADLs by performing:    UE Dressing to be assessed (goal met 5/18/2018)  LE Dressing with Minimal Assistance. (goal partially met with socks)  Toilet transfer to bedside commode with Contact Guard Assistance.    Updated goals 5/21/2018: Will continue to work on goals 2 and 3 above and add  UE dressing with SBA                       Reasons for Discontinuation of Therapy Services  Transfer to alternate level of care.      Plan:     Patient Discharged to: Home with Home Health Service    Ani Dueñas OT  5/29/2018

## 2018-05-29 NOTE — PT/OT/SLP PROGRESS
Occupational Therapy   Treatment    Name: Merari Romero  MRN: [id number]  Admitting Diagnosis:  Closed fracture of left pubis       Recommendations:     Discharge Recommendations: home health OT  Discharge Equipment Recommendations:  bedside commode, bath bench, walker, flora  Barriers to discharge:  None    Subjective     Communicated with: nursing prior to session.  Pain/Comfort:  · Pain Rating 1: 0/10  · Pain Rating Post-Intervention 1: 0/10    Patients cultural, spiritual, Restorationist conflicts given the current situation: none voiced    Objective:     Patient found: Supine in bed with daughter present    General Precautions: Standard, fall   Orthopedic Precautions:LUE non weight bearing, LLE weight bearing as tolerated   Braces: N/A     Occupational Performance:      Patient left HOB elevated with call button in reach, nursing notified and daughter present      Treatment & Education:  0# AROM Bicep curls, RD/UD, SUP/PRO, DF/VF, and tendon glides x 10 reps  Education:    Assessment:     Merari Romeor is a 78 y.o. female with a medical diagnosis of Closed fracture of left pubis.  Pt educated on above mentioned exercises to be done as part of her HEP.  Pt and daughter verbalizes understanding.  Performance deficits affecting function are impaired self care skills, impaired functional mobilty.      Rehab Prognosis:  fair; patient would benefit from acute skilled OT services to address these deficits and reach maximum level of function.       Plan:     Patient to be seen 3 x/week, 5 x/week to address the above listed problems via self-care/home management, therapeutic activities, therapeutic exercises  · Plan of Care Expires: 05/29/18  · Plan of Care Reviewed with: patient, daughter    This Plan of care has been discussed with the patient who was involved in its development and understands and is in agreement with the identified goals and treatment plan    GOALS:    Occupational Therapy Goals        Problem: Occupational  Therapy Goal    Goal Priority Disciplines Outcome Interventions   Occupational Therapy Goal     OT, PT/OT Ongoing (interventions implemented as appropriate)    Description:  Goals to be met by: 05/24/2018     Patient will increase functional independence with ADLs by performing:    UE Dressing to be assessed (goal met 5/18/2018)  LE Dressing with Minimal Assistance. (goal partially met with socks)  Toilet transfer to bedside commode with Contact Guard Assistance.    Updated goals 5/21/2018: Will continue to work on goals 2 and 3 above and add  UE dressing with SBA                       Time Tracking:     OT Date of Treatment: 05/29/18  OT Start Time: 0210  OT Stop Time: 0225  OT Total Time (min): 15 min    Billable Minutes:Therapeutic Exercise 15 min    Ani Dueñas OT  5/29/2018

## 2018-05-29 NOTE — ASSESSMENT & PLAN NOTE
lovenox 40 daily due to hx of DVT and immobility, restart pletal at home     Rechecked left leg US to r/o clot - this was normal.

## 2018-05-29 NOTE — PROGRESS NOTES
Left hip xray reveals possible fracture will order CT of pelvis to further evaluate reason for left leg disfunction

## 2018-05-29 NOTE — PLAN OF CARE
05/29/18 1510   Medicare Message   Important Message from Medicare regarding Discharge Appeal Rights Given to patient/caregiver;Explained to patient/caregiver;Signed/date by patient/caregiver   Date IMM was signed 05/29/18   Time IMM was signed 1400     Notice of Medicare Non-Coverage signed.

## 2018-05-29 NOTE — ASSESSMENT & PLAN NOTE
Give small dose toradol today - this didn't help.  Try advil- no relief  Check x rays today and consult ortho  U/s for DVT negative as well as uric acid  This was re evaluated and thought to be radiated pain from back and lumbar erays support that thought. Decadron helped, ortho here to re eval this am, new hip fracture noted, no changes to POC

## 2018-05-29 NOTE — PLAN OF CARE
Problem: Physical Therapy Goal  Goal: Physical Therapy Goal  1. Pt will demonstrate hemiwalker use for 25 ft - WBAT Left lower extremiity with minimal assistance for safety   Outcome: Unable to achieve outcome(s) by discharge  PT POC goals partially achieved.    Comments: PT POC goals partially achieved.

## 2018-05-29 NOTE — ASSESSMENT & PLAN NOTE
Non operative   Pain control try and cont po as much as poss  Try and reduce risk of associated complications of limited mobility(IS, nebs, PT)  Discussed with Bo GONZALEZ ortho. Seen patient 5/16, cont non wt bearing to left upper ext with long arm splint and wt bearing as tolerated to left lower ext. F/u Dr Lemos 3 weeks from fall  for repeat films  Cont PT with home health

## 2018-05-29 NOTE — PROGRESS NOTES
[facility]  Orthopedics  Progress Note    Patient Name: Merari Romero  MRN: [id number]  Admission Date: 5/17/2018  Code Status: Full Code   Attending Provider: Jared Bond MD   Consulting Provider: Lenny Urbina NP  Primary Care Physician: Shaun Barraza MD  Principal Problem:<principal problem not specified>    Follow-up note after CT of left hip  Subjective:         Past Medical History:   Diagnosis Date    DVT (deep venous thrombosis)     left leg    Hypertension     Neuropathy     Osteoporosis      Past Surgical History:   Procedure Laterality Date    FEMUR SURGERY      repair - left leg    HYSTERECTOMY  1970's    JOINT REPLACEMENT      left knee    NASAL FRACTURE SURGERY       Family History     Problem Relation (Age of Onset)    Heart disease Mother, Father        Social History Main Topics    Smoking status: Former Smoker     Years: 40.00     Types: Cigarettes     Quit date: 8/9/2000    Smokeless tobacco: Never Used    Alcohol use No    Drug use: No    Sexual activity: Yes     Partners: Male     Birth control/ protection: Surgical     Social History   Substance Use Topics    Smoking status: Former Smoker     Years: 40.00     Types: Cigarettes     Quit date: 8/9/2000    Smokeless tobacco: Never Used    Alcohol use No       Review of patient's allergies indicates:   Allergen Reactions    No known drug allergies        Current Inpatient Meds:    Current Facility-Administered Medications:     acetaminophen tablet 650 mg, 650 mg, Oral, Q8H PRN, Janae Daly, NP, 650 mg at 05/25/18 1047    albuterol-ipratropium 2.5 mg-0.5 mg/3 mL nebulizer solution 3 mL, 3 mL, Nebulization, Q12H, Janae Daly, NP, 3 mL at 05/29/18 0743    amitriptyline tablet 25 mg, 25 mg, Oral, QHS, Janae Daly, NP, 25 mg at 05/28/18 2107    amLODIPine tablet 5 mg, 5 mg, Oral, Daily, Janae Daly, NP, 5 mg at 05/29/18 0722    atorvastatin tablet 20 mg, 20 mg, Oral, Daily, Janae NEAL  CARLOS Daly, 20 mg at 05/29/18 0722    docusate sodium capsule 100 mg, 100 mg, Oral, BID, Janae Daly NP, 100 mg at 05/29/18 0721    DULoxetine DR capsule 30 mg, 30 mg, Oral, Daily, Janae Daly, CARLOS, 30 mg at 05/29/18 0722    enoxaparin injection 40 mg, 40 mg, Subcutaneous, Daily, Janae Daly NP, 40 mg at 05/28/18 1739    GI cocktail (mylanta 30 mL, lidocaine 2 % viscous 10 mL, dicyclomine 10 mL) 50 mL, , Oral, BID PRN, Lance Tabor MD    HYDROcodone-acetaminophen 5-325 mg per tablet 1 tablet, 1 tablet, Oral, Q4H PRN, Janae Daly NP, 1 tablet at 05/29/18 1052    metoprolol succinate (TOPROL-XL) 24 hr tablet 50 mg, 50 mg, Oral, Daily, Janae Daly NP, 50 mg at 05/29/18 0722    morphine injection 2 mg, 2 mg, Intravenous, Q4H PRN, Janae Daly NP, 2 mg at 05/27/18 1416    mupirocin 2 % ointment, , Topical (Top), Daily, Janae Daly NP    ondansetron injection 4 mg, 4 mg, Intravenous, Q8H PRN, Janae Daly NP, 4 mg at 05/27/18 0012    pantoprazole EC tablet 40 mg, 40 mg, Oral, Daily, Janae Daly NP, 40 mg at 05/29/18 0721    promethazine (PHENERGAN) 12.5 mg in dextrose 5 % 50 mL IVPB, 12.5 mg, Intravenous, Q6H PRN, Janae Daly NP, Last Rate: 150 mL/hr at 05/27/18 0123, 12.5 mg at 05/27/18 0123    sodium chloride 0.9% flush 10 mL, 10 mL, Intravenous, BID, Michael Chacon MD, 10 mL at 05/28/18 2109    zolpidem tablet 5 mg, 5 mg, Oral, Nightly PRN, Lance Tabor MD, 5 mg at 05/26/18 4077          Objective:     Vital Signs (Most Recent):  Temp: 96.8 °F (36 °C) (05/29/18 1205)  Pulse: 101 (05/29/18 1205)  Resp: 18 (05/29/18 1205)  BP: (!) 150/76 (05/29/18 1205)  SpO2: 98 % (05/29/18 1205) Vital Signs (24h Range):  Temp:  [96.1 °F (35.6 °C)-97.4 °F (36.3 °C)] 96.8 °F (36 °C)  Pulse:  [] 101  Resp:  [16-18] 18  SpO2:  [93 %-98 %] 98 %  BP: (111-150)/(60-76) 150/76     Weight: 62.8 kg (138 lb 8 oz)      SpO2: 98 %  O2 Device (Oxygen  Therapy): room air            Labs:   Recent Lab Results       05/27/18  0923      Baso # 0.03     Basophil% 0.4     Differential Method Automated     Eos # 0.1     Eosinophil% 1.1     Gran # (ANC) 6.1     Gran% 73.0     Hematocrit 38.6     Hemoglobin 12.5     Lymph # 1.6     Lymph% 19.7     MCH 29.1     MCHC 32.4     MCV 90     Mono # 0.5     Mono% 5.8     MPV 9.6     Platelets 455(H)     RBC 4.29     RDW 14.5     WBC 8.28              Imaging:     EXAMINATION:  CT PELVIS WITHOUT CONTRAST    CLINICAL HISTORY:  Pelvic fracture, known or suspected;    TECHNIQUE:  2.5 mm CT images of the pelvis were obtained without contrast.    COMPARISON:  05/29/2018    FINDINGS:  Calcified atheromatous disease affects the aorta and its branch vessels.  Diverticulosis coli is visualized without diverticulitis.    There is a subacute, comminuted fracture of the left inferior pubic ramus and superior pubic body extending into the anterior acetabular wall, with involvement of the medial acetabular wall also noted.  There is evidence for callus formation in the region of these fractures.  Additionally, there is a nondisplaced acute/subacute fracture of the left pubic symphysis.  There is an acute, nondisplaced fracture of the left sacral ala.  No other definite fracture is identified in the severely osteoporotic patient.   Impression       There is a subacute fracture of the left inferior pubic ramus and the high superior left pubic body that extends into the anterior and medial acetabular wall.  There is callus formation about these fracture sites.  Additionally, there is an acute/subacute fracture of the left sacral ala and left superior pubic bones/symphysis without evidence for callus formation suggesting this represent more acute fracture.      Electronically signed by: Marya Mejias MD  Date: 05/29/2018  Time: 13:26         Assessment and Plan:     Active Diagnoses:    Diagnosis Date Noted POA    Left hip pain [M25.552]  05/24/2018 No    Pain in left lower leg [M79.662] 05/24/2018 No    Debility [R53.81] 05/18/2018 Yes    Inadequate dietary energy intake [E63.9] 05/18/2018 Yes    Fall [W19.XXXA] 05/17/2018 Yes    Urinary hesitancy [R39.11] 05/14/2018 Yes    Closed nondisplaced fracture of neck of left radius [S52.135A] 05/11/2018 Yes    Closed fracture of left pubis [S32.502A] 05/11/2018 Yes    History of deep venous thrombosis [Z86.718] 05/11/2018 Not Applicable    Hyperlipidemia LDL goal <70 [E78.5] 01/04/2018 Yes    Lumbar radiculopathy [M54.16] 04/01/2015 Yes    HTN (hypertension) [I10] 08/09/2012 Yes    Neuropathy of lower extremity [G57.90] 08/09/2012 Yes      Problems Resolved During this Admission:    Diagnosis Date Noted Date Resolved POA       VTE Risk Mitigation         Ordered     enoxaparin injection 40 mg  Daily      05/17/18 1159     IP VTE HIGH RISK PATIENT  Once      05/17/18 1158     Place sequential compression device  Until discontinued      05/17/18 1158          PLAN:  Patient may continue WBAT to LLE.  May use platform walker (left arm platform) to assist with ambulation  May remove splint on left arm and use a sling for comfort as needed, aggressive ROM left elbow  F/U with Dr. Hudson next Wednesday for repeat evaluation    Lenny Urbina NP  Orthopedics  [facility]

## 2018-05-29 NOTE — SUBJECTIVE & OBJECTIVE
Interval History: see      Review of Systems   Constitutional: Negative for chills and fever.   Respiratory: Negative for shortness of breath.    Cardiovascular: Negative for chest pain and palpitations.   Gastrointestinal: Negative for abdominal pain, blood in stool, constipation and nausea.   Genitourinary: Negative for difficulty urinating and pelvic pain.   Musculoskeletal: Positive for arthralgias (left ankle), back pain and myalgias.        Left hip pain with moving as well as left arm pain with moving   Skin:        Left middle toe bruised   Psychiatric/Behavioral: Negative for dysphoric mood, sleep disturbance and suicidal ideas. The patient is not nervous/anxious.      Objective:     Vital Signs (Most Recent):  Temp: 96.9 °F (36.1 °C) (05/29/18 0720)  Pulse: 80 (05/29/18 0743)  Resp: 16 (05/29/18 0743)  BP: (!) 141/73 (05/29/18 0720)  SpO2: 98 % (05/29/18 0743) Vital Signs (24h Range):  Temp:  [96.1 °F (35.6 °C)-97.4 °F (36.3 °C)] 96.9 °F (36.1 °C)  Pulse:  [73-91] 80  Resp:  [16-18] 16  SpO2:  [93 %-98 %] 98 %  BP: (111-141)/(60-73) 141/73     Weight: 62.8 kg (138 lb 8 oz)  Body mass index is 24.53 kg/m².    Intake/Output Summary (Last 24 hours) at 05/29/18 0811  Last data filed at 05/28/18 1755   Gross per 24 hour   Intake              880 ml   Output                0 ml   Net              880 ml      Physical Exam   Constitutional: She is oriented to person, place, and time. She appears well-developed. No distress.   HENT:   Head: Normocephalic and atraumatic.   Eyes: Conjunctivae are normal. Pupils are equal, round, and reactive to light.   Neck: Normal range of motion. Neck supple. No thyromegaly present.   Cardiovascular: Normal rate, regular rhythm, normal heart sounds and intact distal pulses.    Pulmonary/Chest: Effort normal and breath sounds normal. No respiratory distress. She has no wheezes.   Abdominal: Soft. Bowel sounds are normal. There is no tenderness.   Musculoskeletal: She exhibits  tenderness (hip bilaterally. + left with rotation). She exhibits no edema.   Left sling in place    Left foot with MTP arthritic changes     +Homans   Lymphadenopathy:     She has no cervical adenopathy.   Neurological: She is alert and oriented to person, place, and time.   Skin: Skin is warm and dry. No rash noted.   Psychiatric: She has a normal mood and affect. Her behavior is normal.   Nursing note and vitals reviewed.      Significant Labs:   Lab Results   Component Value Date    WBC 8.28 05/27/2018    HGB 12.5 05/27/2018    HCT 38.6 05/27/2018    MCV 90 05/27/2018     (H) 05/27/2018     Uric acid 4.6    Significant Imaging:     Left hip x ray No definite evidence of a displaced fracture or dislocation.    The symphysis pubis is limited to evaluation due to angulation and positioning and a pubis fracture cannot be excluded..    CXR 2.3 cm confluence of shadows overlying the left upper lobe.  Consider nonurgent follow-up chest with lateral for further evaluation  .  REPEAT CXR 5/17  Chronic lung changes are seen bilaterally.  Focal region of scarring is seen in the left suprahilar location, less prominent as compared to the previous study of 05/11/2018.  No consolidation or pleural effusions.  The heart is normal in size.  Calcified atheromatous disease affects the aorta.  The bones are osteopenic and show age-appropriate degenerative change.    Left elbow x ray There is a fracture involving the radial neck.  Posterior fat pad is present compatible with an effusion.    Repeat left elbow Images are obtained through casting material at nearly 90° flexion.  There is a slightly impacted healing radial neck fracture with alignment similar to the May 11th 2018 exam.    Left hip x rays repeat  There are fractures involving the left symphysis pubis and the inferior ramus which were limited to evaluation on the prior study.    No evidence of a fracture of the left hip.  No dislocation.  Vascular calcifications  overlying the left femoral head and neck.    Left knee There is generalized osteopenia.  There are stable postoperative changes from a total left knee arthroplasty.  No acute fractures or dislocations or joint effusions.  The    There is vascular calcification involving the popliteal artery and the trifurcation vessels.    Left foot Degenerative changes involving the 1st metatarsophalangeal joint with hallux valgus deformity.  Degenerative change of the interphalangeal joints and the midfoot at the tarsal metatarsal joint spaces.    There is no evidence of fracture, dislocation or other acute osseous abnormality. No focal soft tissue abnormality.    Left ankle There is no evidence of fracture, dislocation or other acute osseous abnormality. No focal soft tissue abnormality.  Vascular calcifications.    US lower ext No evidence of deep venous thrombosis in the left lower extremity.    lumbar spine x ray There is a levoscoliosis in the lower lumbar region.  There is follow-up prominent demineralization.  There is a grade 1 anterolisthesis of L5 upon S1.  Prominent lower lumbar degenerative facet changes are present.  There is diffuse demineralization.      EKG Normal sinus rhythm  Nonspecific T wave abnormality        CRANIAL NERVES     CN III, IV, VI   Pupils are equal, round, and reactive to light.

## 2018-05-29 NOTE — ASSESSMENT & PLAN NOTE
radiculopathy from lower back  US of Left LE - rule out DVT (though unlikely, she had a +homans and calf tenderness).negative. Given NSAIDS without relief. Uric acid negative. Check x rays today and re-consult ortho.  Lumbar x rays and steroids, repeat hip shows occult fracture, non displaced, cont same treatment

## 2018-05-29 NOTE — PT/OT/SLP DISCHARGE
Physical Therapy Discharge Summary    Name: Merari Romero  MRN: [id number]   Principal Problem: Closed fracture of left pubis     Patient Discharged from acute Physical Therapy on 2018 following a.m. PT tx. session.  Please refer to prior PT noted date on 2018 for functional status.     Assessment:     Patient appropriate for care in another setting.    Objective:     GOALS:    Physical Therapy Goals        Problem: Physical Therapy Goal    Goal Priority Disciplines Outcome Goal Variances Interventions   Physical Therapy Goal     PT/OT, PT      Description:  Goals to be met by: 2018     Patient will increase functional independence with mobility by performin. Supine to sit with Stand-by Assistance  2. Sit to supine with Stand-by Assistance  3. Rolling to Left and Right with Stand-by Assistance.  4. Sit to stand transfer with Stand-by Assistance, using bronson-walker  5. Bed to chair transfer with Stand-by Assistance using Bronson-walker  6. Gait  x 50 feet with Stand-by Assistance using Bronson-walker.              Problem: Physical Therapy Goal    Goal Priority Disciplines Outcome Goal Variances Interventions   Physical Therapy Goal     PT/OT, PT      Description:  Ongoing efforts  to achieved previously documented goals           Problem: Physical Therapy Goal    Goal Priority Disciplines Outcome Goal Variances Interventions   Physical Therapy Goal     PT/OT, PT Unable to achieve outcome(s) by discharge     Description:  1. Pt will demonstrate hemiwalker use for 25 ft - WBAT Left lower extremiity with minimal assistance for safety           Problem: Physical Therapy Goal    Goal Priority Disciplines Outcome Goal Variances Interventions   Physical Therapy Goal     PT/OT, PT      Description:  Ongoing efforts to achieve stated goals of transfers and self help activities                     Reasons for Discontinuation of Therapy Services  Transfer to alternate level of care.      Plan:     Patient Discharged  to: Home with Home Health Service.    Dipak Lemos, PT  5/29/2018

## 2018-05-29 NOTE — ASSESSMENT & PLAN NOTE
Treat pain - known fracture. Can bear weight.  With worsening pain, may consider repeat imaging if she can't bear weight on hip, though.    Repeat image today to r/o occult hip fracture, if negative will d/c home wit cont therapy

## 2018-05-29 NOTE — PT/OT/SLP PROGRESS
"Physical Therapy Treatment    Patient Name:  Merari Romero   MRN:  [id number]    Recommendations:     Discharge Recommendations:  home health PT, home with home health   Discharge Equipment Recommendations: bedside commode, other (see comments), walker, flora, wheelchair (platform walker)   Barriers to discharge: None    Assessment:     Merari Romero is a 78 y.o. female admitted with a medical diagnosis of Closed fracture of left pubis.  She presents with the following impairments/functional limitations:  weakness, gait instability, impaired balance, decreased lower extremity function, impaired endurance, decreased upper extremity function, impaired self care skills, impaired functional mobilty, pain, decreased safety awareness .  Pt. has partially achieved PT POC goals and anticipates D/C home today with  PT.    Rehab Prognosis:  Fair+; patient would benefit from acute skilled PT services to address these deficits and reach maximum level of function.      Recent Surgery: * No surgery found *      Plan:     During this hospitalization, patient to be seen  (Anticipated D/C home 5/29/2018) to address the above listed problems via therapeutic activities, therapeutic exercises, gait training  · Plan of Care Expires:  05/29/18 (following a.m. PT tx. session.)   Plan of Care Reviewed with: patient    Subjective     Communicated with patient and patient's family prior to session.  Patient found supine in bed upon PT entry to room, agreeable to treatment.      Chief Complaint: LLE pain  Patient comments/goals: "I'm not hurting as much as I was yesterday."  Pain/Comfort:  · Pain Rating 1: 5/10  · Location - Side 1: Left  · Location - Orientation 1: distal  · Location 1: leg  · Pain Addressed 1: Cessation of Activity, Nurse notified, Reposition  · Pain Rating Post-Intervention 1: 5/10    Patients cultural, spiritual, Episcopal conflicts given the current situation: none verbalized    Objective:     Patient found with:   "     General Precautions: Standard, fall   Orthopedic Precautions:LUE weight bearing as tolerated, LLE weight bearing as tolerated   Braces:       Functional Mobility:  · Bed Mobility:     · Rolling Left:  stand by assistance  · Rolling Right: stand by assistance  · Scooting: stand by assistance  · Bridging: stand by assistance  · Supine to Sit: contact guard assistance  · Sit to Supine: minimum assistance  · Transfers:     · Sit to Stand:  contact guard assistance with hemiwalker  · Bed to Chair: contact guard assistance with  hemiwalker  using  Stand Pivot  Balance:    Static Sit: GOOD-: Takes MODERATE challenges from all directions but inconsistently  Dynamic Sit: FAIR+: Maintains balance through MINIMAL excursions of active trunk motion  Static Stand: FAIR: Maintains without assist but unable to take challenges  · Dynamic stand: POOR: Needs MOD (moderate) assist during gait  ·       AM-PAC 6 CLICK MOBILITY  Turning over in bed (including adjusting bedclothes, sheets and blankets)?: 2  Sitting down on and standing up from a chair with arms (e.g., wheelchair, bedside commode, etc.): 2  Moving from lying on back to sitting on the side of the bed?: 2  Moving to and from a bed to a chair (including a wheelchair)?: 2  Need to walk in hospital room?: 1  Climbing 3-5 steps with a railing?: 1  Total Score: 10       Therapeutic Activities and Exercises:   There. Act.:  Weight shifting and weight acceptance tasks performed in standing with min. A.  Trunk control tasks performed with min. A. while pt. seated EOB and in bedside chair. Transfer Training performed with assistance as noted.  VC's and manual guidance given to pt. for sequencing.     There. Ex.:  Pt. Performed gentle A/A exercises of BLE's while seated in bedside chair and supine in bed, for N.M. Tone and strength and fluid dynamics.  BLE: quad sets, glut sets, SAQ, LAQ (2x10) performed with pt.    Patient left up in chair with all lines intact, call button in  nanda RN notified and family present..    GOALS:    Physical Therapy Goals        Problem: Physical Therapy Goal    Goal Priority Disciplines Outcome Goal Variances Interventions   Physical Therapy Goal     PT/OT, PT      Description:  Goals to be met by: 2018     Patient will increase functional independence with mobility by performin. Supine to sit with Stand-by Assistance  2. Sit to supine with Stand-by Assistance  3. Rolling to Left and Right with Stand-by Assistance.  4. Sit to stand transfer with Stand-by Assistance, using bronson-walker  5. Bed to chair transfer with Stand-by Assistance using Bronson-walker  6. Gait  x 50 feet with Stand-by Assistance using Bronson-walker.              Problem: Physical Therapy Goal    Goal Priority Disciplines Outcome Goal Variances Interventions   Physical Therapy Goal     PT/OT, PT      Description:  Ongoing efforts  to achieved previously documented goals           Problem: Physical Therapy Goal    Goal Priority Disciplines Outcome Goal Variances Interventions   Physical Therapy Goal     PT/OT, PT Unable to achieve outcome(s) by discharge     Description:  1. Pt will demonstrate hemiwalker use for 25 ft - WBAT Left lower extremiity with minimal assistance for safety           Problem: Physical Therapy Goal    Goal Priority Disciplines Outcome Goal Variances Interventions   Physical Therapy Goal     PT/OT, PT      Description:  Ongoing efforts to achieve stated goals of transfers and self help activities                     Time Tracking:     PT Received On: 18  PT Start Time: 1055     PT Stop Time: 1119  PT Total Time (min): 24 min     Billable Minutes: Therapeutic Activity 15 minutes and Therapeutic Exercise 9 minutes    Treatment Type: Treatment  PT/PTA: PT           Dipak Lemos, PT  2018

## 2018-05-29 NOTE — ASSESSMENT & PLAN NOTE
radiculopathy from lower back  US of Left LE - rule out DVT (though unlikely, she had a +homans and calf tenderness).negative. Given NSAIDS without relief. Uric acid negative. Check x rays today and re-consult ortho.  Lumbar x rays and steroids

## 2018-05-29 NOTE — ASSESSMENT & PLAN NOTE
Ortho consulted via ER, non surgical orthopedic injuries per Dr Doc Wolf, non wt bearing injury to left upper ext Splinted   She also can wt bear as tolerated to left lower ext   Bo Urbina NP with ortho came 5/16, no changes, f/u Aminta 3 weeks for repeat films  Undressed splint 5/21 to assess skin tears, healing well, no s/s infection. Will undress biweekly and apply bactroban  5/25/18 Stable- doing well  5/29/2018 x rays arm show healing can progress to platform walker

## 2018-05-29 NOTE — ASSESSMENT & PLAN NOTE
Ortho consulted via ER, non surgical orthopedic injuries per Dr Doc Wolf, non wt bearing injury to left upper ext Splinted   She also can wt bear as tolerated to left lower ext   Bo Urbina NP with ortho came 5/16, no changes, f/u Aminta 3 weeks for repeat films  Undressed splint 5/21 to assess skin tears, healing well, no s/s infection. Will undress biweekly and apply bactroban  5/25/18 Stable- doing well  5/29/2018 x rays arm show healing

## 2018-05-29 NOTE — PLAN OF CARE
05/29/18 1514   Final Note   Assessment Type Final Discharge Note   Discharge Disposition Home-Health   What phone number can be called within the next 1-3 days to see how you are doing after discharge? [phone]   Hospital Follow Up  Appt(s) scheduled? Yes   Discharge plans and expectations educations in teach back method with documentation complete? Yes   Right Care Referral Info   Post Acute Recommendation Home-care   Referral Type Home Health   Facility Name [address]

## 2018-05-30 ENCOUNTER — SOCIAL WORK (OUTPATIENT)
Dept: CASE MANAGEMENT | Facility: HOSPITAL | Age: 78
End: 2018-05-30

## 2018-05-30 NOTE — PROGRESS NOTES
Order received for a walker with platform.  Faxed the order and documentation to [facility] for delivery to the patient's home.    Andrei Sandra LMSW

## 2018-06-14 ENCOUNTER — TELEPHONE (OUTPATIENT)
Dept: ADMINISTRATIVE | Facility: CLINIC | Age: 78
End: 2018-06-14

## 2018-06-14 ENCOUNTER — OFFICE VISIT (OUTPATIENT)
Dept: NEUROLOGY | Facility: CLINIC | Age: 78
End: 2018-06-14
Payer: MEDICARE

## 2018-06-14 VITALS
RESPIRATION RATE: 16 BRPM | HEIGHT: 63 IN | SYSTOLIC BLOOD PRESSURE: 94 MMHG | BODY MASS INDEX: 23.14 KG/M2 | WEIGHT: 130.63 LBS | DIASTOLIC BLOOD PRESSURE: 68 MMHG | HEART RATE: 96 BPM

## 2018-06-14 DIAGNOSIS — M47.816 LUMBAR FACET ARTHROPATHY: Primary | ICD-10-CM

## 2018-06-14 DIAGNOSIS — M54.5 LOW BACK PAIN, UNSPECIFIED BACK PAIN LATERALITY, UNSPECIFIED CHRONICITY, WITH SCIATICA PRESENCE UNSPECIFIED: ICD-10-CM

## 2018-06-14 DIAGNOSIS — R20.0 LEFT LEG NUMBNESS: ICD-10-CM

## 2018-06-14 DIAGNOSIS — Z86.718 HISTORY OF DEEP VENOUS THROMBOSIS: ICD-10-CM

## 2018-06-14 DIAGNOSIS — M79.662 PAIN IN LEFT LOWER LEG: ICD-10-CM

## 2018-06-14 PROCEDURE — 99214 OFFICE O/P EST MOD 30 MIN: CPT | Mod: S$GLB,,, | Performed by: NURSE PRACTITIONER

## 2018-06-14 PROCEDURE — 3074F SYST BP LT 130 MM HG: CPT | Mod: CPTII,S$GLB,, | Performed by: NURSE PRACTITIONER

## 2018-06-14 PROCEDURE — 99999 PR PBB SHADOW E&M-EST. PATIENT-LVL IV: CPT | Mod: PBBFAC,,, | Performed by: NURSE PRACTITIONER

## 2018-06-14 PROCEDURE — 3078F DIAST BP <80 MM HG: CPT | Mod: CPTII,S$GLB,, | Performed by: NURSE PRACTITIONER

## 2018-06-14 RX ORDER — AMLODIPINE BESYLATE 5 MG/1
TABLET ORAL
COMMUNITY
Start: 2014-05-01 | End: 2018-06-14

## 2018-06-14 RX ORDER — CILOSTAZOL 50 MG/1
50 TABLET ORAL
COMMUNITY
End: 2018-06-14

## 2018-06-14 RX ORDER — FLUTICASONE FUROATE AND VILANTEROL 100; 25 UG/1; UG/1
1 POWDER RESPIRATORY (INHALATION) DAILY
COMMUNITY

## 2018-06-14 RX ORDER — PREGABALIN 75 MG/1
75 CAPSULE ORAL 2 TIMES DAILY
COMMUNITY
End: 2019-10-29

## 2018-06-14 RX ORDER — PREGABALIN 75 MG/1
CAPSULE ORAL
COMMUNITY
End: 2018-06-14

## 2018-06-14 RX ORDER — HYDROCODONE BITARTRATE AND ACETAMINOPHEN 7.5; 325 MG/1; MG/1
TABLET ORAL
COMMUNITY
End: 2019-10-29

## 2018-06-14 RX ORDER — DULOXETIN HYDROCHLORIDE 30 MG/1
30 CAPSULE, DELAYED RELEASE ORAL DAILY
COMMUNITY
Start: 2018-05-29

## 2018-06-14 RX ORDER — LORATADINE 10 MG/1
TABLET ORAL
COMMUNITY
End: 2019-10-29

## 2018-06-14 RX ORDER — ALBUTEROL SULFATE 0.63 MG/3ML
0.63 SOLUTION RESPIRATORY (INHALATION) EVERY 6 HOURS PRN
COMMUNITY
End: 2019-10-29

## 2018-06-14 RX ORDER — ALBUTEROL SULFATE 0.83 MG/ML
SOLUTION RESPIRATORY (INHALATION)
COMMUNITY
End: 2018-06-14

## 2018-06-14 RX ORDER — DICYCLOMINE HYDROCHLORIDE 20 MG/1
TABLET ORAL
COMMUNITY
End: 2018-06-14

## 2018-06-14 RX ORDER — MECLIZINE HYDROCHLORIDE 25 MG/1
TABLET ORAL
COMMUNITY
End: 2019-10-29

## 2018-06-14 RX ORDER — METOPROLOL SUCCINATE 50 MG/1
TABLET, EXTENDED RELEASE ORAL
COMMUNITY
End: 2018-06-14

## 2018-06-14 RX ORDER — ATORVASTATIN CALCIUM 20 MG/1
TABLET, FILM COATED ORAL
COMMUNITY
End: 2018-06-14

## 2018-06-14 RX ORDER — FUROSEMIDE 20 MG/1
TABLET ORAL
COMMUNITY
End: 2019-10-29

## 2018-06-14 RX ORDER — AMITRIPTYLINE HYDROCHLORIDE 25 MG/1
25 TABLET, FILM COATED ORAL
COMMUNITY
Start: 2018-02-06 | End: 2018-06-14

## 2018-06-14 RX ORDER — IPRATROPIUM BROMIDE 0.5 MG/2.5ML
SOLUTION RESPIRATORY (INHALATION)
COMMUNITY
End: 2019-10-29

## 2018-06-14 NOTE — PROGRESS NOTES
"HPI: Merari Romero is a 78 y.o. female was previously followed by Dr. Preston for multiple neurological work [place] in the [place], for complaint of saddle anesthesia, BLE weakness and pain, with low back pain, with unknown etiology. She progressed with more complaints, with incontinence at times. Work up initially revealed degenerative disc disease at L5-S1 and monoclonal gammonopathy of undetermined signficance; repeat EMGs normal with normal T-spine imaging and stable degenerative changes on C-spine imaging. She was seen by Dr. Chavez in 2013, and was referred to cardiology for evaluation of PAD, which is followed by Dr. Seals. She had a Sudoscan per Dr. Shaun Barraza, which suggested peripheral autonomic neuropathy. She was also worked up by Rheumatology in the past, with no evidence of CT disease.     She was referred back to clinic today by Dr. Barraza for "cerebral ischemia"/abnormal MRI Brain, as she was having hallucinations.     She was last seen in 3/2017 for workup of bilateral leg pain, paresthesias and lumbar pain. An EMG BLE was ordered, but was never completed, for reasons, which are unknown. Repeat neuropathy labs and Vitamin D was ordered as well, but were not completed. Cymbalta was started for her leg pain and paresthesias; however, she is unsure if this has helped her leg pain and believes that she is taking this for depression.     Her greatest complaint is left ankle pain and numbness to the left lateral leg. She fell in May 2018, and broke her left elbow and her left pelvis in 3 places. She fells as she believed that her steps appeared as if they were moving. Patient is a poor historian.  She is currently in PT for this, which has helped greatly with her gait. She has this through [facility].     She continues with lumbar complaints, BLE paresthesias, and BLE pain.     Review of Systems   Constitutional: Negative for fever.   Eyes: Negative for double vision. "   Respiratory: Negative for hemoptysis.    Cardiovascular: Negative for leg swelling.   Gastrointestinal: Negative for blood in stool.   Genitourinary: Negative for hematuria.   Musculoskeletal: Positive for back pain, falls and joint pain.   Skin: Negative for rash.   Neurological: Positive for tingling and sensory change. Negative for dizziness, speech change, focal weakness, loss of consciousness and headaches.   Psychiatric/Behavioral: Positive for hallucinations. Negative for depression and memory loss. The patient is not nervous/anxious and does not have insomnia.      Exam:   Gen Appearance, well developed/nourished in no apparent distress  CV: 2+ distal pulses with no edema or swelling  Neuro:  MS: Awake, alert, Sustains attention. Recent/remote memory intact, Language is full to spontaneous speech/comprehension. Fund of Knowledge is full  CN: Optic discs are flat with normal vasculature, PERRL, Extraoccular movements and visual fields are full. Normal facial sensation and strength, Tongue and Palate are midline and strong. Shoulder Shrug symmetric and strong.  Motor: Normal except in the left leg and bilateral hand and feet intrinsics, tone mostly normal except possibly the left leg,no fasiculations today, 5/5 strength bilateral upper/lower extremities except 4++/5 bilateral intrinsic foot and hand muscles with left leg reduced to 4/5 left hip flex and 4/5 in the left foot dorsiflexion and knee extension with 2+ reflexes in the arms and 1+ in the legs and bilateral plantar response, No clonus.   Sensory: decrease to temp vibration in the distal legs, but there is increased reduced sensation to pain in the left lateral leg; Romberg negative  Gait: Normal stance, no ataxia, but there is signs of left leg weakness again noted today. -- patient states she usually uses a cane    Imaging:   CT Head 6/7/2018 LOS:   1. Atrophy.   2. Mild periventricular small vessel ischemic change.   3. Intracranial arterial  atherosclerosis.   4. Osteoporosis.     5/2018 X-rays L-spine:  There is a levoscoliosis in the lower lumbar region.  There is follow-up prominent demineralization.  There is a grade 1 anterolisthesis of L5 upon S1.  Prominent lower lumbar degenerative facet changes are present.  There is diffuse demineralization    5/2018 CT Pelvis:  There is a subacute fracture of the left inferior pubic ramus and the high superior left pubic body that extends into the anterior and medial acetabular wall.  There is callus formation about these fracture sites.  Additionally, there is an acute/subacute fracture of the left sacral ala and left superior pubic bones/symphysis without evidence for callus formation suggesting this represent more acute fracture.    5/2018 X-ray Left Ankle:  FINDINGS:  There is no evidence of fracture, dislocation or other acute osseous abnormality. No focal soft tissue abnormality.  Vascular calcifications.     5/2018 X-ray Left Foot:  Degenerative changes involving the 1st metatarsophalangeal joint with hallux valgus deformity.  Degenerative change of the interphalangeal joints and the midfoot at the tarsal metatarsal joint spaces.    There is no evidence of fracture, dislocation or other acute osseous abnormality. No focal soft tissue abnormality.    5/2018 X-ray Left Knee:  There is generalized osteopenia.  There are stable postoperative changes from a total left knee arthroplasty.  No acute fractures or dislocations or joint effusions.  The    There is vascular calcification involving the popliteal artery and the trifurcation vessels.    3/1/12 bone scan reviewed: Degenerative changes only    1/2013 MRI L spine:   IMPRESSION: DISC SPACE NARROWING AT L3-4 AND L5-S1  WITH A SLIGHT FIRST DEGREE SPONDYLOLISTHESIS AT L5-S1 ON  A DEGENERATIVE BASIS OF THE APOPHYSEAL JOINTS AT THIS  LEVEL. 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 PROBABLE SMALL TARLOV  CYST AT T12-L1 ON THE RIGHT.    Sudoscan with Dr Barraza: Possible  autonomic neuropathy    Labs: 3/2016 SPEP/RICHI no monoclonal gammopathy.     Assessment/Recommendation:  Merari Romero is a 77 y.o. female was previously followed by Dr. Preston for multiple neurological work ups in the past[place] were [place], for complaint of saddle anesthesia, BLE weakness and pain, with low back pain, with unknown etiology. She progressed with more complaints, with incontinence at times. Work up initially revealed degenerative disc disease at L5-S1 and monoclonal gammonopathy of undetermined signficance; repeat EMGs normal with normal T-spine imaging and stable degenerative changes on C-spine imaging. She was seen by Dr. Chavez in 2013, and was referred to cardiology for evaluation of PAD, which is followed by Dr. Seals. She had a Sudoscan per Dr. Shaun Barraza, which suggested peripheral autonomic neuropathy. She was also worked up by Rheumatology in the past, with no evidence of CT disease.     She presents today with worsening complaints of bilateral leg pain and paresthesias, as well as worsening lumbar complaints.     1. EMG BLE to assess for lumbar radiculopathy as well as focal neuropathy, which would explain her LLE complaints. She did have L>R lumbar complaints prior to her fall, but left leg numbness and severe pain did not begin until after her 5/2018 fall with left pelvic fracture. She did not comply with 2017 EMG BLE order or neuropathy labs, for reasons, which are unclear. She would be interested in a pain management referral pending EMG results; however, she does have osteoporosis.   2. Cymbalta started for leg pain and lumbar complaints at 3/2017 visit; however, she never followed up and is unsure if this was helpful. Daughter would like her to continue this for mood.   3. Continue Lyrica per PCP for leg pain and paresthesias, which has been somewhat helpful. She took Gabapentin prior. She is also using a compound cream and Norco prn.  Previously she tried baclofen, flexeril  "for pain.  4. The patient has had a sudoscan with PCP showing possible autonomic neuropathy. 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 She does not have any autonomic features currently and EMGs have been normal prior for large fiber changes.  5. Neurosurgery in 2013 recommended no surgery for her Lumbar disc disease.She has had lumbar injections and PT in the past. She is using pain medications with PCP and also has a history of left hip repair which also limits leg movement and gait exam in my opinion.   6. "Hallucinations" description seem to be hypnagogic hallucinations, which resolved after switching her Cymbalta to a.m., rather than p.m. A known side effect of taking Cymbalta qhs is hypnagogic hallucinations. If there is any recurrence, I will order an MRI Brain. Her Head CT showed some atrophy and cerebral atherosclerosis. I would also check a UA, TSH, WAGNER, ESR, and B12 with recurrent hallucinations.   7. Patient is also treated with Pletal by Cardiology for PAD in the legs.   9. Neurological exam has not  worsened since 2013 visit. Advised patient to use a cane always/ fall precautions reviewed.     FU to be determined by EMG.     CC: Dr. Shaun Barraza.         "

## 2018-06-14 NOTE — LETTER
June 14, 2018      Shaun Barraza MD  [address] [facility]  [place] [address]           [facility]  [address] [facility] [address]  Phone: [phone]  Fax: [phone]          Patient: Merari Romero   MR Number: [id number]   YOB: 1940   Date of Visit: 6/14/2018       Dear Dr. Shaun Barraza:    Thank you for referring Merari Romero to me for evaluation. Attached you will find relevant portions of my assessment and plan of care.    If you have questions, please do not hesitate to call me. I look forward to following Merari Romero along with you.    Sincerely,    Anne Marie Sandra, NP    Enclosure  CC:  No Recipients    If you would like to receive this communication electronically, please contact [email or web address] or [phone] to request more information on [facility] access.    For providers and/or their staff who would like to refer a patient to [facility], please contact us through our one-stop-shop provider referral line, [facility] , at [phone].    If you feel you have received this communication in error or would no longer like to receive these types of communications, please e-mail [email or web address]

## 2018-06-22 ENCOUNTER — TELEPHONE (OUTPATIENT)
Dept: ADMINISTRATIVE | Facility: CLINIC | Age: 78
End: 2018-06-22

## 2018-12-10 ENCOUNTER — HOSPITAL ENCOUNTER (OUTPATIENT)
Dept: RADIOLOGY | Facility: HOSPITAL | Age: 78
Discharge: HOME | End: 2018-12-10
Attending: FAMILY MEDICINE
Payer: MEDICARE

## 2018-12-10 DIAGNOSIS — I73.9 PERIPHERAL VASCULAR DISEASE, UNSPECIFIED: ICD-10-CM

## 2018-12-10 DIAGNOSIS — Z86.718 PERSONAL HISTORY OF VENOUS THROMBOSIS AND EMBOLISM: ICD-10-CM

## 2018-12-10 DIAGNOSIS — M79.605 LEG PAIN, LEFT: ICD-10-CM

## 2018-12-10 DIAGNOSIS — R60.9 EDEMA: ICD-10-CM

## 2018-12-10 PROCEDURE — 93971 EXTREMITY STUDY: CPT | Mod: 26,,, | Performed by: RADIOLOGY

## 2018-12-10 PROCEDURE — 93971 EXTREMITY STUDY: CPT | Mod: TC

## 2019-09-12 ENCOUNTER — HOSPITAL ENCOUNTER (OUTPATIENT)
Dept: RADIOLOGY | Facility: HOSPITAL | Age: 79
Discharge: HOME | End: 2019-09-12
Attending: FAMILY MEDICINE
Payer: MEDICARE

## 2019-09-12 DIAGNOSIS — S43.004A DISLOCATION OF RIGHT SHOULDER JOINT: Primary | ICD-10-CM

## 2019-09-12 DIAGNOSIS — S43.004A DISLOCATION OF RIGHT SHOULDER JOINT: ICD-10-CM

## 2019-09-12 PROCEDURE — 73030 X-RAY EXAM OF SHOULDER: CPT | Mod: TC,RT

## 2019-09-16 PROBLEM — H43.813 POSTERIOR VITREOUS DETACHMENT OF BOTH EYES: Status: ACTIVE | Noted: 2019-09-16

## 2019-09-16 PROBLEM — H35.30 MACULAR DEGENERATION, BILATERAL: Status: ACTIVE | Noted: 2019-09-16

## 2019-09-16 PROBLEM — Z96.1 PSEUDOPHAKIA OF BOTH EYES: Status: ACTIVE | Noted: 2019-09-16

## 2019-11-05 PROBLEM — I65.23 BILATERAL CAROTID ARTERY STENOSIS: Status: ACTIVE | Noted: 2019-11-05

## 2019-11-12 PROBLEM — I65.22 CAROTID ARTERY STENOSIS, UNILATERAL, LEFT: Status: ACTIVE | Noted: 2019-11-12

## 2019-11-14 ENCOUNTER — PATIENT OUTREACH (OUTPATIENT)
Dept: ADMINISTRATIVE | Facility: CLINIC | Age: 79
End: 2019-11-14

## 2019-11-14 ENCOUNTER — NURSE TRIAGE (OUTPATIENT)
Dept: ADMINISTRATIVE | Facility: CLINIC | Age: 79
End: 2019-11-14

## 2019-11-14 RX ORDER — TRAZODONE HYDROCHLORIDE 50 MG/1
50 TABLET ORAL NIGHTLY
Status: ON HOLD | COMMUNITY
End: 2020-03-05 | Stop reason: CLARIF

## 2019-11-14 NOTE — PATIENT INSTRUCTIONS
Discharge Instructions for Carotid Endarterectomy   Your doctor performed surgery called a carotid endarterectomy. This is the most common way to restore normal blood flow through the vessels that carry blood to your brain. These vessels are called the carotid arteries. During the surgery, a surgeon made a small incisionin the side of your neck, just below your jaw. The artery was opened and the blockage was cleared. This procedure was done to reduce your risk of a stroke, which can occur when the carotid arteries are severely blocked or narrowed.     Ask a friend or family member to help with chores, especially those that involve lifting.   Home care  · Spend your first few days after surgery relaxing at home. It's OK to do quiet activities such as reading or watching TV.  · Take your medications exactly as instructed. Dont skip doses.  · Dont drive until your doctor says its OK. This will most likely take 1 to 2 weeks.  · Keep the wound dry until your doctor says it's OK to shower. Don't scrub your incision.  · Avoid strenuous activity for 7 to 10 days after your surgery.  · Dont lift anything heavier than 10 pounds for 2 to 3 weeks after your surgery.  · Ask your doctor when you can expect to return to work.  · Shave carefully around your incision. You may want to use an electric razor.  · Gradually increase your activity. It may take some time for you to return to your normal activities.  · Check your incision every day for signs of infection (redness, swelling, drainage, or warmth).  · Dont be alarmed if you have some loss of feeling along your jaw line, the incision line, and earlobe. This is a result of the incision and usually goes away after 6 to 12 months.  Long-term changes at home  · Eat a healthy, low-fat, low cholesterol, and low calorie diet. Ask your doctor for menus and other diet information.  · Maintain your ideal body weight.  · After you have recovered from surgery, try to exercise more,  especially walking. Ask your doctor for guidance.  · If you smoke ask your doctor for help quitting.   Follow-up care  Make a follow-up appointment with your doctor as directed.     When to call your doctor  Call your doctor immediately if you have any of the following (or go to the emergency room if your doctor's office is closed):  · Neck swelling  · Headache, particularly if it does not go away after a couple of hours  · Redness, pain, swelling, or drainage from your incision  · Fever above 100°F (37.7°C)  · Numbness or weakness in your face, arms, or legs  · Sudden changes in your vision  · Loss of vision in 1 eye  · Trouble speaking  · Trouble breathing  · Trouble swallowing   Date Last Reviewed: 6/13/2015  © 1223-5375 [employer]. [address] [place] [address], [place] [address]. All rights reserved. This information is not intended as a substitute for professional medical care. Always follow your healthcare professional's instructions.

## 2019-11-15 NOTE — TELEPHONE ENCOUNTER
Spoke with patient regarding message from Dr. Micheal Mai office to return to ER for further evaluation. Patient verbalized understanding and will reach out to Dr. Mai office to update on status.

## 2019-11-15 NOTE — TELEPHONE ENCOUNTER
Return call to patient to inform of message forward by Dr. Micheal Mai office regarding patient returning to ER for further evaluation of coughing up blood. Left message on patient's voicemail to return call to Dr. Chinchilla office @ [phone] regarding to return to ER for evaluation.

## 2020-01-24 ENCOUNTER — HOSPITAL ENCOUNTER (OUTPATIENT)
Dept: RADIOLOGY | Facility: HOSPITAL | Age: 80
Discharge: HOME | End: 2020-01-24
Attending: FAMILY MEDICINE
Payer: MEDICARE

## 2020-01-24 DIAGNOSIS — Z78.0 ASYMPTOMATIC POSTMENOPAUSAL STATE: ICD-10-CM

## 2020-01-24 PROCEDURE — 77080 DXA BONE DENSITY AXIAL: CPT | Mod: TC

## 2020-02-17 PROBLEM — G45.3 AMAUROSIS FUGAX: Status: ACTIVE | Noted: 2020-02-17

## 2020-03-05 PROBLEM — S52.502A CLOSED FRACTURE OF DISTAL END OF LEFT RADIUS: Status: ACTIVE | Noted: 2020-03-05

## 2020-03-05 PROBLEM — S52.502A CLOSED FRACTURE OF LEFT DISTAL RADIUS: Status: ACTIVE | Noted: 2020-03-05

## 2020-03-13 ENCOUNTER — HOSPITAL ENCOUNTER (OUTPATIENT)
Dept: RADIOLOGY | Facility: HOSPITAL | Age: 80
Discharge: HOME | End: 2020-03-13
Attending: FAMILY MEDICINE
Payer: MEDICARE

## 2020-03-13 DIAGNOSIS — I10 HTN (HYPERTENSION): Primary | ICD-10-CM

## 2020-03-13 DIAGNOSIS — R91.1 SOLITARY PULMONARY NODULE: ICD-10-CM

## 2020-03-13 PROCEDURE — 71260 CT THORAX DX C+: CPT | Mod: 26,,, | Performed by: RADIOLOGY

## 2020-03-13 PROCEDURE — 71260 CT THORAX DX C+: CPT | Mod: TC

## 2020-03-13 PROCEDURE — 71260 CT CHEST WITH CONTRAST: ICD-10-PCS | Mod: 26,,, | Performed by: RADIOLOGY

## 2020-03-13 PROCEDURE — 25500020 PHARM REV CODE 255

## 2020-03-13 RX ADMIN — IOHEXOL 75 ML: 350 INJECTION, SOLUTION INTRAVENOUS at 01:03

## 2020-12-08 NOTE — PLAN OF CARE
05/17/18 1059   Final Note   Assessment Type Final Discharge Note   Discharge Disposition Home   What phone number can be called within the next 1-3 days to see how you are doing after discharge? [phone]   Hospital Follow Up  Appt(s) scheduled? Yes   Discharge plans and expectations educations in teach back method with documentation complete? Yes   Right Care Referral Info   Post Acute Recommendation Home-care   Referral Type SNF   Facility Name [facility] [address], State [place], [place]      Final Anesthesia Post-op Assessment    Patient: Jenn E Duling  Procedure(s) Performed: HEMORRHOIDECTOMY,  Anesthesia type: General    Vitals Value Taken Time   Temp 37.2 °C (99 °F) 12/08/20 0640   Pulse 127 12/08/20 0830   Resp 25 12/08/20 0845   SpO2 99 % 12/08/20 0845   BP 85/36 12/08/20 0830         Patient Location: Phase II  Post-op Vital Signs:stable  Level of Consciousness: awake and alert  Respiratory Status: spontaneous ventilation  Cardiovascular stable  Hydration: euvolemic  Pain Management: adequately controlled  Vomiting: none   Nausea: None  Airway Patency:patent  Post-op Assessment: awake, alert, appropriately conversant, or baseline, no complications, patient tolerated procedure well with no complications, evidence of recall and dentition within defined limits      No complications documented.

## 2025-06-10 NOTE — PT/OT/SLP EVAL
"Occupational Therapy   Evaluation    Name: Merari Romero  MRN: [id number]  Admitting Diagnosis:  Closed nondisplaced fracture of neck of left radius      Recommendations:     Discharge Recommendations:  (needs continuing assessment)  Discharge Equipment Recommendations:   (needs continuing assessment)    History:     Occupational Profile:  Living Environment: At home with spouse  Previous level of function: independent  Equipment Owned:  none  Assistance upon Discharge: assistance from spouse    Past Medical History:   Diagnosis Date    DVT (deep venous thrombosis)     left leg    Hypertension     Neuropathy     Osteoporosis        Past Surgical History:   Procedure Laterality Date    FEMUR SURGERY      repair - left leg    HYSTERECTOMY  1970's    JOINT REPLACEMENT      left knee    NASAL FRACTURE SURGERY         Subjective     Chief Complaint: pain and inability to move  Patient/Family stated goals: Patient would like to return to previous level of function  Communicated with: physical therapist and patient prior to session.  Pain/Comfort:  · Pain Rating 1: 8/10  · Location - Side 1: Left  · Location - Orientation 1:  (pt reports hips, However reported only when she moves, reported decreased once resting)    Patients cultural, spiritual, Quaker conflicts given the current situation:  (none verbalized)    Objective:     Patient found with: bed alarm    General Precautions: Standard, fall   Orthopedic Precautions:LUE non weight bearing (also left LE toe touch weightbearing "if she has to")   Braces: UE Sling     Occupational Performance:    Bed Mobility:    · Patient completed Supine to Sit with maximal assistance  · Patient completed Sit to Supine with maximal assistance    Activities of Daily Living:  To be assessed when appropriate- see goals    Cognitive/Visual Perceptual:  Cognitive/Psychosocial Skills:     -       Oriented to: Person, Place, Time and Situation   -       Follows Commands/attention:Follows " Physical Therapy Visit    Visit Type: Daily Treatment Note  Visit: 4  Referring Provider: Alphonso Masterson III, MD  Medical Diagnosis (from order): S82.851D - Closed trimalleolar fracture of right ankle with routine healing, subsequent encounter     SUBJECTIVE                                                                                                               Patient comes in today reporting that right lower extremity has been a little sore with shooting pains, but patient believes this is due to increased motion and exercise. Patient reports she still is unable to fit in any of her shoes and she continues to move around in the boot. They state that certain activities such as weightbearing on right lower extremity with boot have required less effort since last visit. Patient reports compliance with performing the home exercise program regularly and has no questions or concerns with completing any of the exercises given. When asked, the patient's largest complaint/concern is still that of right ankle motion.       Pain / Symptoms  - Pain rating (out of 10): Current: 4 ; Best: 0; Worst: 8      OBJECTIVE                                                                                                                                         Treatment     Therapeutic Exercise  Nustep level 6 - 10 minutes   Long sitting ankle pumps - x20 (right)  Long sitting ankle circles CW/CCW - x20 (right)  Long sitting inversion/eversion - x20 (right)  Long sitting ankle alphabet - (right)     *Increased amount of time taken to perform exercises due to transitions, slow repetitions to focus on proper form and rest breaks required by patient.     Manual Therapy   STM to arch, medial ankle, lateral ankle and calf of right lower extremity  Scar mobility to medial and lateral scar of right lower extremity  Kinesio fan taping for edema control, lateral and medial ankle - (right)     Gait Training  Ambulation - patient educated on and  "multistep  commands    Physical Exam:  Sensation:    -       Impaired  light touch and localization right hand digits 1-3 intact, however pt reports numbness  Dominant hand:    -       right  Upper Extremity Range of Motion:     -       Right Upper Extremity: shoulder limited in active motion due to surgery ~ 4 yrs ago, functional to ~130 degrees with assistance, WFL elbow to distal  -       Left Upper Extremity: not assessed currently due to pt in sling, will assess if appropriate after pt is seen by ortho  Upper Extremity Strength:    -       Right Upper Extremity: fair   Strength:    -       Right Upper Extremity: fair    Patient left supine with all lines intact, call button in reach and bed alarm on    Treatment & Education:  Educated patient and family on role and goals of OT  Education:    Assessment:     Merari Romero is a 78 y.o. female with a medical diagnosis of Closed nondisplaced fracture of neck of left radius. Performance deficits affecting function are weakness, impaired sensation, impaired self care skills, impaired functional mobilty, pain, decreased upper extremity function.      Rehab Prognosis:  Currently fair only due to ortho precautions; patient would benefit from acute skilled OT services to address these deficits and reach maximum level of function.         Clinical Decision Makin.  OT Low:  "Pt evaluation falls under low complexity for evaluation coding due to performance deficits noted in 1-3 areas as stated above and 0 co-morbities affecting current functional status. Data obtained from problem focused assessments. No modifications or assistance was required for completion of evaluation. Only brief occupational profile and history review completed."     Plan:     Patient to be seen  (3 to 5 x week) to address the above listed problems via self-care/home management, therapeutic activities, therapeutic exercises  · Plan of Care Expires:    · Plan of Care Reviewed with: patient, " performed ambulation with use of 2ww and no CAM boot. Emphasis on heel to toe gait pattern, looking up during ambulation, proper lead foot and appropriate yolis/pace.      Skilled input: verbal instruction/cues, tactile instruction/cues, demonstration and posture correction    Writer verbally educated and received verbal consent for hand placement, positioning of patient, and techniques to be performed today from patient for hand placement and palpation for techniques and therapist position for techniques as described above and how they are pertinent to the patient's plan of care.  Home Exercise Program  Access Code: [email or web address]  URL: [email or web address]  Date: 05/28/2025  Prepared by: Taco Arriola    Exercises  - Supine Ankle Pumps  - 3 x daily - 7 x weekly - 30 reps  - Supine Ankle Circles  - 3 x daily - 7 x weekly - 30 reps  - Supine Ankle Inversion Eversion AROM  - 3 x daily - 7 x weekly - 30 reps  - Seated Ankle Alphabet  - 3 x daily - 7 x weekly  - Ankle and Toe Plantarflexion with Resistance  - 3 x daily - 7 x weekly - 10 reps - 3 hold  - Seated Toe Towel Scrunches  - 7 x weekly - 20 reps - 3 hold      ASSESSMENT                                                                                                            Patient tolerated today's treatment session well without any major increase in pain or adverse symptoms. Patient continues to present with impairments of increased levels of pain, tightness/point tenderness of right ankle musculature, right lower extremity weakness, and decreased right ankle joint range of motion, all of which affect their normal day to day activities. Today's interventions included right ankle stretching/range of motion exercises to revert tight musculature and manual therapy for increased tissue pliability/joint mobility to increase range of motion. Patient responded well to manual therapy performed based on patient report of decreased muscle  spouse    This Plan of care has been discussed with the patient who was involved in its development and understands and is in agreement with the identified goals and treatment plan    GOALS:    Occupational Therapy Goals        Problem: Occupational Therapy Goal    Goal Priority Disciplines Outcome Interventions   Occupational Therapy Goal     OT, PT/OT     Description:  Goals to be met by: Discharge from facility     Patient will increase functional independence with ADLs by performing:    UE Dressing to be assessed as a more permanent immobilization splint is placed on left UE  LE Dressing to be assessed once weightbearing precautions are finalized once patient is seen by ortho  Toileting to be assessed once weightbearing precautions are finalized once patient is seen by ortho                       Time Tracking:     OT Date of Treatment: 05/14/18  OT Start Time: 1410  OT Stop Time: 1420  OT Total Time (min): 10 min    Billable Minutes:Evaluation 10 min    LEYLA Plascencia  5/14/2018     tension by the end of today's visit. Due to the impairments listed above, skilled outpatient physical therapy is appropriate for this patient.  Pain/symptoms after session (out of 10): 4  Education:   - Results of above outlined education: Verbalizes understanding    PLAN                                                                                                                           Suggestions for next session as indicated: To be performed as appropriate or indicated  Strengthening exercises: right lower extremity  Range of motion exercises: right ankle  Soft tissue mobilization: right calf, ankle musculature and scar mobility   Joint mobilizations: right ankle   Neuro re-education: balance exercises   Gait training: stairs and gait for endurance     Next visit: check in on shoes         Therapy procedure time and total treatment time can be found documented on the Time Entry flowsheet